# Patient Record
Sex: FEMALE | Race: WHITE | Employment: UNEMPLOYED | URBAN - METROPOLITAN AREA
[De-identification: names, ages, dates, MRNs, and addresses within clinical notes are randomized per-mention and may not be internally consistent; named-entity substitution may affect disease eponyms.]

---

## 2017-01-11 ENCOUNTER — OFFICE VISIT (OUTPATIENT)
Dept: ORTHOPEDIC SURGERY | Age: 52
End: 2017-01-11

## 2017-01-11 VITALS
HEART RATE: 104 BPM | BODY MASS INDEX: 18.48 KG/M2 | DIASTOLIC BLOOD PRESSURE: 82 MMHG | TEMPERATURE: 98 F | SYSTOLIC BLOOD PRESSURE: 115 MMHG | WEIGHT: 106 LBS

## 2017-01-11 DIAGNOSIS — M21.20 FLEXION DEFORMITY: Primary | ICD-10-CM

## 2017-01-11 DIAGNOSIS — Z98.890 POST-OPERATIVE STATE: ICD-10-CM

## 2017-01-11 DIAGNOSIS — M79.675 TOE PAIN, LEFT: ICD-10-CM

## 2017-01-11 NOTE — MR AVS SNAPSHOT
Visit Information Date & Time Provider Department Dept. Phone Encounter #  
 1/11/2017  2:00  Edil Beckman, [facility] [phone] Follow-up Instructions Return in about 3 weeks (around 2/1/2017) for follow up evaluation. Upcoming Health Maintenance Date Due Hepatitis C Screening 1965 DTaP/Tdap/Td series (1 - Tdap) 3/16/1985 PAP AKA CERVICAL CYTOLOGY 3/16/1985 BREAST CANCER SCRN MAMMOGRAM 3/16/2014 FOBT Q 1 YEAR AGE 50-75 3/16/2014 INFLUENZA AGE 9 TO ADULT 8/1/2016 Allergies as of 1/11/2017  Review Complete On: 1/11/2017 By: Juan Beckman PA-C Severity Noted Reaction Type Reactions Aspirin    Unknown (comments) Severe stomach pain and bleeding Lyrica [Pregabalin]  10/12/2015    Other (comments) Slurred speech Nsaids (Non-steroidal Anti-inflammatory Drug)  10/12/2015    Other (comments) Hx  Of bleeding ulcers Sulfa (Sulfonamide Antibiotics)    Rash Tetracycline    Hives Current Immunizations  Never Reviewed No immunizations on file. Not reviewed this visit You Were Diagnosed With   
  
 Codes Comments Flexion deformity    -  Primary ICD-10-CM: M21.20 ICD-9-CM: 736.9 Post-operative state     ICD-10-CM: O35.575 ICD-9-CM: V45.89 Toe pain, left     ICD-10-CM: F17.028 ICD-9-CM: 729.5 Vitals BP Pulse Temp Weight(growth percentile) BMI OB Status 115/82 (BP 1 Location: Left arm, BP Patient Position: Sitting) (!) 104 98 °F (36.7 °C) (Oral) 106 lb (48.1 kg) 18.48 kg/m2 Postmenopausal  
 Smoking Status Former Smoker BMI and BSA Data Body Mass Index Body Surface Area  
 18.48 kg/m 2 1.47 m 2 Preferred [phone] [address] Your Updated Medication List  
  
   
 This list is accurate as of: 1/11/17  2:33 PM.  Always use your most recent med list.  
  
  
  
  
 0.9% sodium chloride solution ADDERALL 10 mg tablet Generic drug:  dextroamphetamine-amphetamine Take 10 mg by mouth three (3) times daily. baclofen 10 mg tablet Commonly known as:  LIORESAL Take 20 mg by mouth four (4) times daily. biotin 10,000 mcg Cap Take  by mouth. CALCIUM+D PO Take  by mouth. COMPAZINE 10 mg tablet Generic drug:  prochlorperazine Take 5 mg by mouth every six (6) hours as needed. COREG 6.25 mg tablet Generic drug:  carvedilol Take 6.25 mg by mouth daily. EFFEXOR  mg capsule Generic drug:  venlafaxine-SR Take  by mouth daily. estradiol 0.5 mg tablet Commonly known as:  ESTRACE Take  by mouth. FOSAMAX 70 mg tablet Generic drug:  alendronate Take  by mouth every Sunday. * HYDROmorphone 2 mg tablet Commonly known as:  DILAUDID Take one tablet PO Q 6 HRS as needed for **POST OPERATIVE BREAKTHROUGH PAIN**  
  
 * HYDROmorphone 2 mg tablet Commonly known as:  DILAUDID  
TAKE ONE TABLET BY MOUTH Q 6 HRS PRN for POST OPERATIVE BREAKTHROUGH PAIN **DO NOT FILL BEFORE 1/10/17**  Indications: PAIN  
  
 imipramine 25 mg tablet Commonly known as:  TOFRANIL Take 100 mg by mouth nightly. LaMICtal 100 mg tablet Generic drug:  lamoTRIgine Take  by mouth two (2) times a day. MARINOL 5 mg capsule Generic drug:  dronabinol Take 5 mg by mouth two (2) times daily as needed. Indications: HEADACHE  
  
 medroxyPROGESTERone 5 mg tablet Commonly known as:  PROVERA Take 5 mg by mouth daily. multivitamin tablet Commonly known as:  ONE A DAY Take 1 Tab by mouth daily. * PERCOCET  mg per tablet Generic drug:  oxyCODONE-acetaminophen Take 1 Tab by mouth. Take 1/3 tab as needed * oxyCODONE-acetaminophen  mg per tablet Commonly known as:  PERCOCET  
 1 tab po q 4 hrs prn pain * oxyCODONE-acetaminophen 5-325 mg per tablet Commonly known as:  PERCOCET Take 1 Tab by mouth three (3) times daily. Max Daily Amount: 3 Tabs. * oxyCODONE-acetaminophen 5-325 mg per tablet Commonly known as:  PERCOCET Take 1 Tab by mouth two (2) times a day. Max Daily Amount: 2 Tabs. * polyethylene glycol 17 gram/dose powder Commonly known as:  Reginia Crazier Take 17 g by mouth daily. * polyethylene glycol 17 gram/dose powder Commonly known as:  Reginia Crazier Take 17 g by mouth daily. predniSONE 10 mg tablet Commonly known as:  Berle Pouch Take  by mouth daily (with breakfast). * promethazine 25 mg tablet Commonly known as:  PHENERGAN Take 1 Tab by mouth every six (6) hours as needed for Nausea. * promethazine 25 mg tablet Commonly known as:  PHENERGAN Take 1 Tab by mouth every six (6) hours as needed for Nausea. PROTONIX 40 mg tablet Generic drug:  pantoprazole Take 40 mg by mouth daily. Twice a day RITALIN 10 mg tablet Generic drug:  methylphenidate Take  by mouth three (3) times daily. VITAMIN B COMPLEX PO Take  by mouth. ZOFRAN (AS HYDROCHLORIDE) 8 mg tablet Generic drug:  ondansetron hcl Take 8 mg by mouth every eight (8) hours as needed for Nausea. * Notice: This list has 10 medication(s) that are the same as other medications prescribed for you. Read the directions carefully, and ask your doctor or other care provider to review them with you. Follow-up Instructions Return in about 3 weeks (around 2/1/2017) for follow up evaluation. Patient Instructions · Sutures removed in the office today. You may shower in 2 days, no submerging in any water · Continue Activity modification · Weight bearing status:  non weight bearing to the left lower extremity · No lifting, twisting, squatting, deep bending, driving or strenuous activity. PLEASE SEEK IMMEDIATE ASSESSMENT BY ER PHYSICIAN IF ANY OF THE FOLLOWING EXIST:  
 
Excessive pain, swelling, redness or odor of or around the surgical area Temperature over 100.5 Nausea and vomiting lasting longer than 4 hours or if unable to take medications Any signs of decreased circulation or nerve impairment to extremity: change in color, persistent numbness, tingling, coldness or increase pain If any calf pain, calf tightness, shortness of breath, chest pain Any difficulty breathing at rest or with ambulation, any chest tightness/soreness Severe intractable pain, persistent swelling or drainage, development of a wound, incisional redness, finger/toe swelling or color changes, or CALF PAIN 
 
· Perform ankle pumps with non-surgical/non-injured extremity to help reduce the risk of blood clots · Please follow up in 2-3 weeks Introducing [facility] SERVICES! Dear Ruel: Thank you for requesting a [facility] account. Our records indicate that you already have an active [facility] account. You can access your account anytime at [email or web address] Did you know that you can access your hospital and ER discharge instructions at any time in [facility]? You can also review all of your test results from your hospital stay or ER visit. Additional Information If you have questions, please visit the Frequently Asked Questions section of the [facility] website at [email or web address] Remember, [facility] is NOT to be used for urgent needs. For medical emergencies, dial 911. Now available from your iPhone and Android! Please provide this summary of care documentation to your next provider. Your primary care clinician is listed as Reyna Owens. If you have any questions after today's visit, please call [phone].

## 2017-01-11 NOTE — PATIENT INSTRUCTIONS
· Sutures removed in the office today. You may shower in 2 days, no submerging in any water    · Continue Activity modification    · Weight bearing status:  non weight bearing to the left lower extremity    · No lifting, twisting, squatting, deep bending, driving or strenuous activity.     PLEASE SEEK IMMEDIATE ASSESSMENT BY ER PHYSICIAN IF ANY OF THE FOLLOWING EXIST:     Excessive pain, swelling, redness or odor of or around the surgical area   Temperature over 100.5   Nausea and vomiting lasting longer than 4 hours or if unable to take medications   Any signs of decreased circulation or nerve impairment to extremity: change in color, persistent numbness, tingling, coldness or increase pain   If any calf pain, calf tightness, shortness of breath, chest pain   Any difficulty breathing at rest or with ambulation, any chest tightness/soreness  Severe intractable pain, persistent swelling or drainage, development of a wound, incisional redness, finger/toe swelling or color changes, or CALF PAIN    · Perform ankle pumps with non-surgical/non-injured extremity to help reduce the risk of blood clots    · Please follow up in 2-3 weeks

## 2017-01-11 NOTE — PROGRESS NOTES
Patient: Ana Cristina Keller                MRN: [id number]       SSN: [id number]  YOB: 1965            AGE: 45 y.o. SEX: female    Tsering Joseph MD    POST OP OFFICE NOTE  DOS: 12/22/16    HPI:     The patient is a 45 y.o. female who presents today for follow up 20 days s/p revision left number four toe PIP resection arthroplasty. Patient has been NWB to the left lower extremity. Patient reports doing well other than post op pain which is controlled. Patient denies any fever, chills, chest pain, shortness of breath or calf pain. There are no new illness or injuries to report since last seen in the office. PHYSICAL EXAM:     Visit Vitals    /82 (BP 1 Location: Left arm, BP Patient Position: Sitting)    Pulse (!) 104    Temp 98 °F (36.7 °C) (Oral)    Wt 106 lb (48.1 kg)    BMI 18.48 kg/m2       GEN:  Alert, well developed, well nourished, well appearing 45 y.o. female in no acute distress. PSYCH:  Normal affect, mood, and conduct. alert, oriented x 3 alert, oriented x 3, no dementia  EXAMINATION OF: left foot  DRESSINGS: CDI  DRAINAGE: none  INCISION: Incision looks good, skin well approximated, no dehiscence  SKIN: mild edema , no erythema, no  ecchymosis, no warmth    TENDERNESS:  mild tenderness to palpation (as expected after surgery)  NEUROVASCULAR:  grossly intact. Positive distal pulses and capillary refill. DVT ASSESSMENT:  The calf is not tender to palpation. No evidence of DVT seen on physical exam.  ROM: not tested    IMPRESSION:     Encounter Diagnoses     ICD-10-CM ICD-9-CM   1. Flexion deformity M21.20 736.9   2. Post-operative state Z98.890 V45.89   3. Toe pain, left M79.675 729.5       PLAN:     · Dressing: sutures removed in the office today. Patient placed in post op shoe    · Keep the current dressings on and in place. You need to keep this incision clean and dry. If you have a splint or cast on please keep this clean and dry as well.     · You should expect swelling and bruising in the area over the next several days. You may elevate the left extremity on 1 pillow. Place the pillow horizontal so that no pressure is on the back of your heel. You may apply an icepack on top of the dressing to help minimize the swelling. · Keep all pets away from  any wound present in order to prevent infection. · Continue Activity modification    · Weight bearing status:  non weight bearing to the left lower extremity    · No lifting, twisting, squatting, deep bending, driving or strenuous activity. PLEASE SEEK IMMEDIATE ASSESSMENT BY ER PHYSICIAN IF ANY OF THE FOLLOWING EXIST:     Excessive pain, swelling, redness or odor of or around the surgical area   Temperature over 100.5   Nausea and vomiting lasting longer than 4 hours or if unable to take medications   Any signs of decreased circulation or nerve impairment to extremity: change in color, persistent numbness, tingling, coldness or increase pain   If any calf pain, calf tightness, shortness of breath, chest pain   Any difficulty breathing at rest or with ambulation, any chest tightness/soreness  Severe intractable pain, persistent swelling or drainage, development of a wound, incisional redness, finger/toe swelling or color changes, or CALF PAIN    · Perform ankle pumps with non-surgical/non-injured extremity to help reduce the risk of blood clots    · Please follow up in 2-3 weeks     · DO NOT DRIVE WHILE TAKING NARCOTIC PAIN MEDICINE      Patient has been discussed with Dr. Jn Mayen during this visit and he agrees with the assessment and plan    Patient expresses understanding of the plan. Patient education provided on post surgical care. REVIEW OF SYSTEMS:     Otherwise as noted in HPI     RADIOGRAPHS & DIAGNOSTIC STUDIES     No results found for any visits on 01/11/17.       Sheridan Ford PA-C  1/11/2017

## 2017-02-02 ENCOUNTER — OFFICE VISIT (OUTPATIENT)
Dept: ORTHOPEDIC SURGERY | Age: 52
End: 2017-02-02

## 2017-02-02 VITALS — DIASTOLIC BLOOD PRESSURE: 85 MMHG | SYSTOLIC BLOOD PRESSURE: 122 MMHG | HEART RATE: 85 BPM

## 2017-02-02 DIAGNOSIS — M79.675 TOE PAIN, LEFT: ICD-10-CM

## 2017-02-02 DIAGNOSIS — Z98.890 POST-OPERATIVE STATE: ICD-10-CM

## 2017-02-02 DIAGNOSIS — M21.20 FLEXION DEFORMITY: Primary | ICD-10-CM

## 2017-02-02 NOTE — PROGRESS NOTES
Patient: Gretchen Cedeño                MRN: [id number]       SSN: [id number]  YOB: 1965            AGE: 45 y.o. SEX: female    Charlie Ochoa MD    POST OP OFFICE NOTE  DOS: 12/22/16    HPI:     The patient is a 45 y.o. female who presents today for follow up 42 days s/p revision left number four toe PIP resection arthroplasty. Patient has been WBAT to the left lower extremity in post op shoe using cane. Patient reports doing well with no post op pain. She states that she is very pleased with her surgery and she is doing much better with much less pain. She has some residual swelling that comes and goes. Patient denies any fever, chills, chest pain, shortness of breath or calf pain. There are no new illness or injuries to report since last seen in the office. PHYSICAL EXAM:     Visit Vitals    /85    Pulse 85       GEN:  Alert, well developed, well nourished, well appearing 45 y.o. female in no acute distress. PSYCH:  Normal affect, mood, and conduct. alert, oriented x 3 alert, oriented x 3, no dementia  EXAMINATION OF: left foot  DRESSINGS: CDI  DRAINAGE: none  INCISION: Incision looks good, skin well approximated, no dehiscence  SKIN: mild edema , no erythema, no  ecchymosis, no warmth    TENDERNESS:  No tenderness to palpation   NEUROVASCULAR:  grossly intact. Positive distal pulses and capillary refill. DVT ASSESSMENT:  The calf is not tender to palpation. No evidence of DVT seen on physical exam.  ROM: WNL    IMPRESSION:     Encounter Diagnoses     ICD-10-CM ICD-9-CM   1. Flexion deformity M21.20 736.9   2. Toe pain, left M79.675 729.5   3. Post-operative state Z98.890 V45.89       PLAN:       · Keep all pets away from any wound present in order to prevent infection.     · Continue Activity modification - continue to wear post op shoe and wean to supportive shoe/sneaker with wide toe box    · Weight bearing status:  weight bearing to the left lower extremity as tolerated    · No lifting, twisting, squatting, deep bending, driving or strenuous activity. · Please follow up in 6 weeks     · DO NOT [address]      Patient has been discussed with Dr. Gilford Jesus during this visit and he agrees with the assessment and plan    Patient expresses understanding of the plan. Patient education provided on post surgical care. REVIEW OF SYSTEMS:     Otherwise as noted in HPI     RADIOGRAPHS & DIAGNOSTIC STUDIES     No results found for any visits on 02/02/17.       Kimmy An PA-C  2/2/2017

## 2017-03-03 DIAGNOSIS — M79.672 LEFT FOOT PAIN: Primary | ICD-10-CM

## 2017-04-19 ENCOUNTER — OFFICE VISIT (OUTPATIENT)
Dept: ORTHOPEDIC SURGERY | Age: 52
End: 2017-04-19

## 2017-04-19 VITALS
SYSTOLIC BLOOD PRESSURE: 116 MMHG | DIASTOLIC BLOOD PRESSURE: 85 MMHG | WEIGHT: 107 LBS | BODY MASS INDEX: 18.27 KG/M2 | HEART RATE: 88 BPM | HEIGHT: 64 IN

## 2017-04-19 DIAGNOSIS — M20.42 HAMMER TOE OF LEFT FOOT: Primary | ICD-10-CM

## 2017-04-19 NOTE — MR AVS SNAPSHOT
Visit Information Date & Time Provider Department Dept. Phone Encounter #  
 4/19/2017  3:30 PM Jessee Coreas, [address] [facility] [phone] Upcoming Health Maintenance Date Due Hepatitis C Screening 1965 DTaP/Tdap/Td series (1 - Tdap) 3/16/1985 PAP AKA CERVICAL CYTOLOGY 3/16/1985 BREAST CANCER SCRN MAMMOGRAM 3/16/2014 FOBT Q 1 YEAR AGE 50-75 3/16/2014 INFLUENZA AGE 9 TO ADULT 8/1/2016 Allergies as of 4/19/2017  Review Complete On: 4/19/2017 By: Allen Piña LPN Severity Noted Reaction Type Reactions Aspirin    Unknown (comments) Severe stomach pain and bleeding Lyrica [Pregabalin]  10/12/2015    Other (comments) Slurred speech Nsaids (Non-steroidal Anti-inflammatory Drug)  10/12/2015    Other (comments) Hx  Of bleeding ulcers Sulfa (Sulfonamide Antibiotics)    Rash Tetracycline    Hives Current Immunizations  Never Reviewed No immunizations on file. Not reviewed this visit Vitals BP Pulse Height(growth percentile) Weight(growth percentile) BMI OB Status 116/85 88 5' 3.5\" (1.613 m) 107 lb (48.5 kg) 18.66 kg/m2 Postmenopausal  
 Smoking Status Former Smoker Vitals History BMI and BSA Data Body Mass Index Body Surface Area  
 18.66 kg/m 2 1.47 m 2 Preferred Pharmacy Pharmacy Name Phone [phone], [address] Your Updated Medication List  
  
   
This list is accurate as of: 4/19/17  3:47 PM.  Always use your most recent med list.  
  
  
  
  
 0.9% sodium chloride solution ADDERALL 10 mg tablet Generic drug:  dextroamphetamine-amphetamine Take 10 mg by mouth three (3) times daily. baclofen 10 mg tablet Commonly known as:  LIORESAL Take 20 mg by mouth four (4) times daily. biotin 10,000 mcg Cap Take  by mouth. CALCIUM+D PO Take  by mouth. COMPAZINE 10 mg tablet Generic drug:  prochlorperazine Take 5 mg by mouth every six (6) hours as needed. COREG 6.25 mg tablet Generic drug:  carvedilol Take 6.25 mg by mouth daily. EFFEXOR  mg capsule Generic drug:  venlafaxine-SR Take  by mouth daily. estradiol 0.5 mg tablet Commonly known as:  ESTRACE Take  by mouth. FOSAMAX 70 mg tablet Generic drug:  alendronate Take  by mouth every Sunday. * HYDROmorphone 2 mg tablet Commonly known as:  DILAUDID Take one tablet PO Q 6 HRS as needed for **POST OPERATIVE BREAKTHROUGH PAIN**  
  
 * HYDROmorphone 2 mg tablet Commonly known as:  DILAUDID  
TAKE ONE TABLET BY MOUTH Q 6 HRS PRN for POST OPERATIVE BREAKTHROUGH PAIN **DO NOT FILL BEFORE 1/10/17**  Indications: PAIN  
  
 imipramine 25 mg tablet Commonly known as:  TOFRANIL Take 100 mg by mouth nightly. LaMICtal 100 mg tablet Generic drug:  lamoTRIgine Take  by mouth two (2) times a day. MARINOL 5 mg capsule Generic drug:  dronabinol Take 5 mg by mouth two (2) times daily as needed. Indications: HEADACHE  
  
 medroxyPROGESTERone 5 mg tablet Commonly known as:  PROVERA Take 5 mg by mouth daily. multivitamin tablet Commonly known as:  ONE A DAY Take 1 Tab by mouth daily. * PERCOCET  mg per tablet Generic drug:  oxyCODONE-acetaminophen Take 1 Tab by mouth. Take 1/3 tab as needed * oxyCODONE-acetaminophen  mg per tablet Commonly known as:  PERCOCET  
1 tab po q 4 hrs prn pain * oxyCODONE-acetaminophen 5-325 mg per tablet Commonly known as:  PERCOCET Take 1 Tab by mouth three (3) times daily. Max Daily Amount: 3 Tabs. * oxyCODONE-acetaminophen 5-325 mg per tablet Commonly known as:  PERCOCET Take 1 Tab by mouth two (2) times a day. Max Daily Amount: 2 Tabs. * polyethylene glycol 17 gram/dose powder Commonly known as:  Sanam Duarte Take 17 g by mouth daily. * polyethylene glycol 17 gram/dose powder Commonly known as:  Sanam Duarte Take 17 g by mouth daily. predniSONE 10 mg tablet Commonly known as:  Yesica Clarisa Take  by mouth daily (with breakfast). * promethazine 25 mg tablet Commonly known as:  PHENERGAN Take 1 Tab by mouth every six (6) hours as needed for Nausea. * promethazine 25 mg tablet Commonly known as:  PHENERGAN Take 1 Tab by mouth every six (6) hours as needed for Nausea. PROTONIX 40 mg tablet Generic drug:  pantoprazole Take 40 mg by mouth daily. Twice a day RITALIN 10 mg tablet Generic drug:  methylphenidate Take  by mouth three (3) times daily. VITAMIN B COMPLEX PO Take  by mouth. ZOFRAN (AS HYDROCHLORIDE) 8 mg tablet Generic drug:  ondansetron hcl Take 8 mg by mouth every eight (8) hours as needed for Nausea. * Notice: This list has 10 medication(s) that are the same as other medications prescribed for you. Read the directions carefully, and ask your doctor or other care provider to review them with you. Patient Instructions Please follow up as needed. You are advised to contact us if your condition worsens. Neuropathic Pain: Care Instructions Your Care Instructions Neuropathic pain is caused by pressure on or damage to your nerves. It's often simply called nerve pain. Some people feel this type of pain all the time. For others, it comes and goes. Diabetes, shingles, or an injury can cause nerve pain. Many people say the pain feels sharp, burning, or stabbing. But some people feel it as a dull ache. In some cases, it makes your skin very sensitive. So touch, pressure, and other sensations that did not hurt before may now cause pain. It's important to know that this kind of pain is real and can affect your quality of life. It's also important to know that treatment can help. Treatment includes pain medicines, exercise, and physical therapy. Medicines can help reduce the number of pain signals that travel over the nerves. This can make the painful areas less sensitive. It can also help you sleep better and improve your mood. But medicines are only one part of successful treatment. Most people do best with more than one kind of treatment. Your doctor may recommend that you try cognitive-behavioral therapy and stress management. Or, if needed, you may decide to try to quit smoking, lower your blood pressure, or better control blood sugar. These kinds of healthy changes can also make a difference. If you feel that your treatment is not working, talk to your doctor. And be sure to tell your doctor if you think you might be depressed or anxious. These are common problems that can also be treated. Follow-up care is a key part of your treatment and safety. Be sure to make and go to all appointments, and call your doctor if you are having problems. It's also a good idea to know your test results and keep a list of the medicines you take. How can you care for yourself at home? · Be safe with medicines. Read and follow all instructions on the label. ¨ If the doctor gave you a prescription medicine for pain, take it as prescribed. ¨ If you are not taking a prescription pain medicine, ask your doctor if you can take an over-the-counter medicine. · Save hard tasks for days when you have less pain. Follow a hard task with an easy task. And remember to take breaks. · Relax, and reduce stress. You may want to try deep breathing or meditation. These can help. · Keep moving. Gentle, daily exercise can help reduce pain. Your doctor or physical therapist can tell you what type of exercise is best for you. This may include walking, swimming, and stationary biking. It may also include stretches and range-of-motion exercises. · Try heat, cold packs, and massage. · Get enough sleep. Constant pain can make you more tired. If the pain makes it hard to sleep, talk with your doctor. · Think positively. Your thoughts can affect your pain. Do fun things to distract yourself from the pain. See a movie, read a book, listen to music, or spend time with a friend. · Keep a pain diary. Try to write down how strong your pain is and what it feels like. Also try to notice and write down how your moods, thoughts, sleep, activities, and medicine affect your pain. These notes can help you and your doctor find the best ways to treat your pain. Reducing constipation caused by pain medicine Pain medicines often cause constipation. To reduce constipation: 
· Include fruits, vegetables, beans, and whole grains in your diet each day. These foods are high in fiber. · Drink plenty of fluids, enough so that your urine is light yellow or clear like water. If you have kidney, heart, or liver disease and have to limit fluids, talk with your doctor before you increase the amount of fluids you drink. · Get some exercise every day. Build up slowly to 30 to 60 minutes a day on 5 or more days of the week. · Take a fiber supplement, such as Citrucel or Metamucil, every day if needed. Read and follow all instructions on the label. · Schedule time each day for a bowel movement. Having a daily routine may help. Take your time and do not strain when having a bowel movement. · Ask your doctor about a laxative. The goal is to have one easy bowel movement every 1 to 2 days. Do not let constipation go untreated for more than 3 days. When should you call for help? Call your doctor now or seek immediate medical care if: 
· You feel sad, anxious, or hopeless for more than a few days. This could mean you are depressed. Depression is common in people who have a lot of pain. But it can be treated. · You have trouble with bowel movements, such as: 
¨ No bowel movement in 3 days. ¨ Blood in the anal area, in your stool, or on the toilet paper. ¨ Diarrhea for more than 24 hours. Watch closely for changes in your health, and be sure to contact your doctor if: 
· Your pain is getting worse. · You can't sleep because of pain. · You are very worried or anxious about your pain. · You have trouble taking your pain medicine. · You have any concerns about your pain medicine or its side effects. · You have vomiting or cramps for more than 2 hours. Where can you learn more? Go to http://roldan-yudith.info/. Enter N144 in the search box to learn more about \"Neuropathic Pain: Care Instructions. \" Current as of: October 14, 2016 Content Version: 11.2 © 5082-3259 [employer]. Care instructions adapted under license by [employer] (which disclaims liability or warranty for this information). If you have questions about a medical condition or this instruction, always ask your healthcare professional. [place] [address] any warranty or liability for your use of this information. Introducing [facility]! Dear Judith Kaminski: Thank you for requesting a Glof[employer] account. Our records indicate that you already have an active Glofox account. You can access your account anytime at [email or web address] Did you know that you can access your hospital and ER discharge instructions at any time in Glofox? You can also review all of your test results from your hospital stay or ER visit. Additional Information If you have questions, please visit the Frequently Asked Questions section of the Glofox website at [email or web address] Remember, Glofox is NOT to be used for urgent needs. For medical emergencies, dial 911. Now available from your iPhone and Android! Please provide this summary of care documentation to your next provider. Your primary care clinician is listed as Rosalia EduardoBaldwin. If you have any questions after today's visit, please call [phone].

## 2017-04-19 NOTE — PATIENT INSTRUCTIONS
Please follow up as needed. You are advised to contact us if your condition worsens. Neuropathic Pain: Care Instructions  Your Care Instructions  Neuropathic pain is caused by pressure on or damage to your nerves. It's often simply called nerve pain. Some people feel this type of pain all the time. For others, it comes and goes. Diabetes, shingles, or an injury can cause nerve pain. Many people say the pain feels sharp, burning, or stabbing. But some people feel it as a dull ache. In some cases, it makes your skin very sensitive. So touch, pressure, and other sensations that did not hurt before may now cause pain. It's important to know that this kind of pain is real and can affect your quality of life. It's also important to know that treatment can help. Treatment includes pain medicines, exercise, and physical therapy. Medicines can help reduce the number of pain signals that travel over the nerves. This can make the painful areas less sensitive. It can also help you sleep better and improve your mood. But medicines are only one part of successful treatment. Most people do best with more than one kind of treatment. Your doctor may recommend that you try cognitive-behavioral therapy and stress management. Or, if needed, you may decide to try to quit smoking, lower your blood pressure, or better control blood sugar. These kinds of healthy changes can also make a difference. If you feel that your treatment is not working, talk to your doctor. And be sure to tell your doctor if you think you might be depressed or anxious. These are common problems that can also be treated. Follow-up care is a key part of your treatment and safety. Be sure to make and go to all appointments, and call your doctor if you are having problems. It's also a good idea to know your test results and keep a list of the medicines you take. How can you care for yourself at home? · Be safe with medicines.  Read and follow all instructions on the label. ¨ If the doctor gave you a prescription medicine for pain, take it as prescribed. ¨ If you are not taking a prescription pain medicine, ask your doctor if you can take an over-the-counter medicine. · Save hard tasks for days when you have less pain. Follow a hard task with an easy task. And remember to take breaks. · Relax, and reduce stress. You may want to try deep breathing or meditation. These can help. · Keep moving. Gentle, daily exercise can help reduce pain. Your doctor or physical therapist can tell you what type of exercise is best for you. This may include walking, swimming, and stationary biking. It may also include stretches and range-of-motion exercises. · Try heat, cold packs, and massage. · Get enough sleep. Constant pain can make you more tired. If the pain makes it hard to sleep, talk with your doctor. · Think positively. Your thoughts can affect your pain. Do fun things to distract yourself from the pain. See a movie, read a book, listen to music, or spend time with a friend. · Keep a pain diary. Try to write down how strong your pain is and what it feels like. Also try to notice and write down how your moods, thoughts, sleep, activities, and medicine affect your pain. These notes can help you and your doctor find the best ways to treat your pain. Reducing constipation caused by pain medicine  Pain medicines often cause constipation. To reduce constipation:  · Include fruits, vegetables, beans, and whole grains in your diet each day. These foods are high in fiber. · Drink plenty of fluids, enough so that your urine is light yellow or clear like water. If you have kidney, heart, or liver disease and have to limit fluids, talk with your doctor before you increase the amount of fluids you drink. · Get some exercise every day. Build up slowly to 30 to 60 minutes a day on 5 or more days of the week.   · Take a fiber supplement, such as Citrucel or Metamucil, every day if needed. Read and follow all instructions on the label. · Schedule time each day for a bowel movement. Having a daily routine may help. Take your time and do not strain when having a bowel movement. · Ask your doctor about a laxative. The goal is to have one easy bowel movement every 1 to 2 days. Do not let constipation go untreated for more than 3 days. When should you call for help? Call your doctor now or seek immediate medical care if:  · You feel sad, anxious, or hopeless for more than a few days. This could mean you are depressed. Depression is common in people who have a lot of pain. But it can be treated. · You have trouble with bowel movements, such as:  ¨ No bowel movement in 3 days. ¨ Blood in the anal area, in your stool, or on the toilet paper. ¨ Diarrhea for more than 24 hours. Watch closely for changes in your health, and be sure to contact your doctor if:  · Your pain is getting worse. · You can't sleep because of pain. · You are very worried or anxious about your pain. · You have trouble taking your pain medicine. · You have any concerns about your pain medicine or its side effects. · You have vomiting or cramps for more than 2 hours. Where can you learn more? Go to http://roldan-yudith.info/. Enter P510 in the search box to learn more about \"Neuropathic Pain: Care Instructions. \"  Current as of: October 14, 2016  Content Version: 11.2  © 1283-1605 [employer]. Care instructions adapted under license by [employer] (which disclaims liability or warranty for this information). If you have questions about a medical condition or this instruction, always ask your healthcare professional. [place] [address] any warranty or liability for your use of this information.

## 2017-04-19 NOTE — PROGRESS NOTES
AMBULATORY PROGRESS NOTE      Patient: Jayleen Worrell             MRN: [id number]     SSN: [id number] Body mass index is 18.66 kg/(m^2). YOB: 1965     AGE: 47 y.o. EX: female    PCP: Georgina Dang MD    IMPRESSION/DIAGNOSIS AND TREATMENT PLAN     DIAGNOSES  1. Hammer toe of left foot         Jayleen Worrell understands her diagnoses and the proposed plan. Plan:    1) Continue activity as tolerated    RTO - PRN, as she is doing well from the 4th left toe surgery. HPI AND EXAMINATION     Jayleen Worrell IS A 47 y.o. female who presents to my outpatient office today for follow up 42 days s/p revision left number four toe PIP resection arthroplasty. The patient presents to the office today stating that she has been WBAT to the LLE. Patient reports doing well with no post op pain. She states that she is very pleased with her surgery and she is doing much better with much less pain. She does not numbness over the fourth left toe. Patient denies any fever, chills, chest pain, shortness of breath or calf pain. There are no new illness or injuries to report since last seen in the office. Patient is getting a trigeminal neuralgia implant in 5/2017. PHYSICAL EXAM: 4/19/2017      Patient is a well developed, well nourished 47 y.o. female alert and oriented x 3 in no acute distress. Visit Vitals    /85    Pulse 88    Ht 5' 3.5\" (1.613 m)    Wt 107 lb (48.5 kg)    BMI 18.66 kg/m2        Jayleen Worrell arrives to office via: with and without assistive device:   she is accompanied in the examination room by: Mother  APPEARANCE: alert, well appearing, and in no distress. PSYCH:  Normal affect, mood, and conduct. alert, oriented x 3  no dementia  GEN: Alert, well developed, well nourished, well appearing 45 y.o. female in no acute distress. PSYCH: Normal affect, mood, and conduct.  alert, oriented x 3 alert, oriented x 3, no dementia  EXAMINATION OF: left foot  DRESSINGS: CDI  DRAINAGE: none  INCISION: Incision looks good, skin well approximated, no dehiscence  SKIN: mild edema , no erythema, no ecchymosis, no warmth   TENDERNESS: No tenderness to palpation   NEUROVASCULAR: grossly intact. Positive distal pulses and capillary refill. DVT ASSESSMENT: The calf is not tender to palpation. No evidence of DVT seen on physical exam.  ROM: WNL    ASSESSMENT/PLAN     1. INSTRUCTIONS: WBAT    2. RESTRICTIONS: Work/Activity/School restrictions: Activity as tolerated    3. MEDICATIONS: No orders of the defined types were placed in this encounter. 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 4. FOLLOW UP TO SEE [facility]: if any calf pain, shortness of breath, any calf   Swelling    PLEASE SEEK IMMEDIATE ASSESSMENT BY ER PHYSICIAN IF ANY OF THE FOLLOWING EXIST:   Excessive pain, swelling, redness or odor of or around the surgical area   Temperature over 100.5   Nausea and vomiting lasting longer than 4 hours or if unable to take medications   Any signs of decreased circulation or nerve impairment to extremity: change in color, persistent numbness, tingling, coldness or increase pain   If Any calf pain, calf tightness, shortness of breath, chest pain  Any difficulty breathing at rest or with ambulation, any chest tightness/soreness    Severe intractable pain, Persistent swelling or drainage, development of a   wound, incisional redness, finger/toe swelling or color changes, or CALF PAIN.     CHART REVIEW     Past Medical History:   Diagnosis Date    Anemia     Chronic pain     Contact lens/glasses fitting     Frequent UTI 2006    GERD (gastroesophageal reflux disease)     H/O eating disorder     remote    Hip fracture, left ([facility] [address] 2000    Hypertension     Leg length discrepancy     Needs pre-anesthesia assessment Patient reports a history of high tolerance to pain medicine and prior hisoty of drug use    Post herpetic neuralgia     PUD (peptic ulcer disease)     Rheumatoid arthritis ([address]     Shingles April 2014    Tooth abscess 10/6/15    Completed Amoxicillin      Current Outpatient Prescriptions   Medication Sig    prochlorperazine (COMPAZINE) 10 mg tablet Take 5 mg by mouth every six (6) hours as needed.  polyethylene glycol (MIRALAX) 17 gram/dose powder Take 17 g by mouth daily.  dextroamphetamine-amphetamine (ADDERALL) 10 mg tablet Take 10 mg by mouth three (3) times daily.  oxyCODONE-acetaminophen (PERCOCET)  mg per tablet 1 tab po q 4 hrs prn pain    imipramine (TOFRANIL) 25 mg tablet Take 100 mg by mouth nightly.  polyethylene glycol (MIRALAX) 17 gram/dose powder Take 17 g by mouth daily.  CALCIUM CARBONATE/VITAMIN D3 (CALCIUM+D PO) Take  by mouth.  biotin 10,000 mcg cap Take  by mouth.  VITAMIN B COMPLEX PO Take  by mouth.  multivitamin (ONE A DAY) tablet Take 1 Tab by mouth daily.  lamoTRIgine (LAMICTAL) 100 mg tablet Take  by mouth two (2) times a day.  dronabinol (MARINOL) 5 mg capsule Take 5 mg by mouth two (2) times daily as needed. Indications: HEADACHE    carvedilol (COREG) 6.25 mg tablet Take 6.25 mg by mouth daily.  ondansetron hcl (ZOFRAN, AS HYDROCHLORIDE,) 8 mg tablet Take 8 mg by mouth every eight (8) hours as needed for Nausea.  alendronate (FOSAMAX) 70 mg tablet Take  by mouth every Sunday.  venlafaxine-SR (EFFEXOR XR) 150 mg capsule Take  by mouth daily.  baclofen (LIORESAL) 10 mg tablet Take 20 mg by mouth four (4) times daily.  pantoprazole (PROTONIX) 40 mg tablet Take 40 mg by mouth daily. Twice a day    predniSONE (DELTASONE) 10 mg tablet Take  by mouth daily (with breakfast).  oxyCODONE-acetaminophen (PERCOCET)  mg per tablet Take 1 Tab by mouth.  Take 1/3 tab as needed    HYDROmorphone (DILAUDID) 2 mg tablet Take one tablet PO Q 6 HRS as needed for **POST OPERATIVE BREAKTHROUGH PAIN**    HYDROmorphone (DILAUDID) 2 mg tablet TAKE ONE TABLET BY MOUTH Q 6 HRS PRN for POST OPERATIVE BREAKTHROUGH PAIN  **DO NOT FILL BEFORE 1/10/17**  Indications: PAIN    promethazine (PHENERGAN) 25 mg tablet Take 1 Tab by mouth every six (6) hours as needed for Nausea.  oxyCODONE-acetaminophen (PERCOCET) 5-325 mg per tablet Take 1 Tab by mouth two (2) times a day. Max Daily Amount: 2 Tabs.  oxyCODONE-acetaminophen (PERCOCET) 5-325 mg per tablet Take 1 Tab by mouth three (3) times daily. Max Daily Amount: 3 Tabs.  0.9% sodium chloride solution     medroxyPROGESTERone (PROVERA) 5 mg tablet Take 5 mg by mouth daily.  promethazine (PHENERGAN) 25 mg tablet Take 1 Tab by mouth every six (6) hours as needed for Nausea.  estradiol (ESTRACE) 0.5 mg tablet Take  by mouth.  methylphenidate (RITALIN) 10 mg tablet Take  by mouth three (3) times daily. No current facility-administered medications for this visit. Allergies   Allergen Reactions    Aspirin Unknown (comments)     Severe stomach pain and bleeding    Lyrica [Pregabalin] Other (comments)     Slurred speech    Nsaids (Non-Steroidal Anti-Inflammatory Drug) Other (comments)     Hx  Of bleeding ulcers    Sulfa (Sulfonamide Antibiotics) Rash    Tetracycline Hives     Past Surgical History:   Procedure Laterality Date    HX GYN  2012    fibroids    HX ORTHOPAEDIC Left 2012    partial hip replacement    HX ORTHOPAEDIC  2009    attempt bunionectomy    HX ORTHOPAEDIC  10/2015    Dr. Sandra Toussaint: Mauricio Mejia History     Occupational History    Not on file.      Social History Main Topics    Smoking status: Former Smoker     Quit date: 7/20/2015    Smokeless tobacco: Never Used    Alcohol use No    Drug use: No      Comment: Prior history of drug use    Sexual activity: Not on file     Family History   Problem Relation Age of Onset    Arthritis-osteo Other     Stroke Mother     Hypertension Mother        REVIEW OF SYSTEMS : 4/19/2017  ALL BELOW ARE Negative except : SEE HPI       Constitutional: Negative for fever, chills and weight loss. Neg Weigh Loss  Cardiovascular: Negative for chest pain, claudication and leg swelling. SOB, HOFFMANN   Gastrointestinal: Negative for  pain, N/V/D/C, Blood in stool or urine,dysuria, hematuria,        Incontinence, pelvic pain  Musculoskeletal: see HPI. Neurological: Negative for dizziness and weakness. Negative for headaches,Visual Changes, Confusion, Seizures,   Psychiatric/Behavioral: Negative for depression, memory loss and substance abuse. Extremities:  Negative for  hair changes, rash or skin lesion changes. Hematologic: Negative for Bleeding problems, bruising, pallor or swollen lymph nodes. Peripheral Vascular: No calf pain, vascular vein tenderness to calf pain              No calf throbbing, posterior knee throbbing pain    DIAGNOSTIC IMAGING     No notes on file    Written by Paige Ramirez, as dictated by Roberto Garrison MD. IDr., Roberto Garrison MD, confirm that all documentation is accurate.

## 2018-04-10 ENCOUNTER — OFFICE VISIT (OUTPATIENT)
Dept: ORTHOPEDIC SURGERY | Age: 53
End: 2018-04-10

## 2018-04-10 VITALS
OXYGEN SATURATION: 93 % | BODY MASS INDEX: 18.27 KG/M2 | TEMPERATURE: 96.4 F | WEIGHT: 107 LBS | HEART RATE: 73 BPM | SYSTOLIC BLOOD PRESSURE: 116 MMHG | DIASTOLIC BLOOD PRESSURE: 86 MMHG | HEIGHT: 64 IN

## 2018-04-10 DIAGNOSIS — Z01.818 PREOP EXAMINATION: ICD-10-CM

## 2018-04-10 DIAGNOSIS — Z01.811 PRE-OPERATIVE RESPIRATORY EXAMINATION: ICD-10-CM

## 2018-04-10 DIAGNOSIS — M21.611 BUNION, RIGHT FOOT: Primary | ICD-10-CM

## 2018-04-10 DIAGNOSIS — Z01.810 PRE-OPERATIVE CARDIOVASCULAR EXAMINATION: ICD-10-CM

## 2018-04-10 DIAGNOSIS — Z01.812 BLOOD TESTS PRIOR TO TREATMENT OR PROCEDURE: ICD-10-CM

## 2018-04-10 NOTE — PROCEDURES
FOOT X RAYS 3 VIEWS Right   4/10/2018    WEIGHT BEARING    X RAYS AT [facility] [address] [place] [address]  4/10/2018      Bones: No fractures or dislocations. No focal osteolytic or osteoblastic process    Bone Spurs: No significant bone spurs   Alignment: Bunion Alignment: WNL    severe Hallux Valgus Alignment, moderate increased IM ANGLE (1st/2nd)   Metatarsus Adductus is mild   Midfoot Alignment is WNL. Joint: Slightly Incongruent  Soft Tissues:  , Soft Tissues Mild swelling dorsal midfoot     No ankle joint effusion in lateral projection. Mineralization: Suggests Osteopenia    I have personally reviewed the results of the above study.  The interpretation of this study is my professional opinion    Debra Pineda MD  4/10/2018  11:52 AM           .

## 2018-04-10 NOTE — PROGRESS NOTES
AMBULATORY PROGRESS NOTE      Patient: Yazmin Shepherd             MRN: [id number]     SSN: [id number] Body mass index is 18.66 kg/(m^2). YOB: 1965     AGE: 46 y.o. EX: female    PCP: Ayden Capone MD    IMPRESSION/DIAGNOSIS AND TREATMENT PLAN     DIAGNOSES  1. Bunion, right foot    2. Pre-operative cardiovascular examination    3. Pre-operative respiratory examination    4. Blood tests prior to treatment or procedure    5. Preop examination        Orders Placed This Encounter    CULTURE, URINE    [67406] Foot Min 3V    XR CHEST PA LAT    CBC WITH AUTOMATED DIFF    METABOLIC PANEL, COMPREHENSIVE    URINALYSIS W/ RFLX MICROSCOPIC    PROTHROMBIN TIME + INR    EKG, 12 LEAD, INITIAL      Yazmin Shepherd understands her diagnoses and the proposed plan. I had a lengthy discussion with her in the presence of her mom. The patient has a bunion deformity and has tenderness to the metatarsal heads. My overall impression is: Moderate to severe bunion deformity to the right forefoot with pronation of the right great toe and with focal tenderness to the metatarsal heads, two, three, four, and five. So, she has metatarsalgia. She has a semi-rigid hammertoe to the right number two and three toes, flexion overpull to the number four and five toes at the flexor digitorum longus tendons corresponding to the number four and five toes. In this individual who radiographically still has joints that are well maintained at the lesser digits two, three, four, and five at the MTP joints, as well as has a severe bunion deformity and pronation of her right great toe, it would be nice to save her joint. A joint-sparing type procedure, I think, would be better for her. She does have rheumatoid arthritis. She is on Imuran and another agent. I will have to double check which one she is on.      So, I think if she had a Lapidus type procedure, a distal soft tissue modified Torres, that would help with her bunion deformity, possible Randy procedure. If she had a right number two and three metatarsophalangeal joint capsulotomies with corresponding extensor digitorum longus lengthening and extensor digitorum brevis tenotomies of the number two and three toes, as well as PIP resection arthroplasties of the number two and three, this would be helpful. These are joint sparing type procedures. For her number four and five toes, I think she may require a flexor digitorum longus tendon release so there will not be so much flexion tone to the number four and five toes. That is what I propose for her. She is going to see a neurosurgeon in May of this year, I believe, May 17, 2018, so that she can discuss with him whether it is okay to have surgery on her right foot from a neurosurgical standpoint. Otherwise, she is going to see me in a couple of weeks. She was given Millie Jarrett, my surgery schedulers card, so we can coordinate something. Plan:    1) Pre-Op Labs: CBC w/ diff, CMP, PT INR, EKG, CXR, UA w/ reflex, Urine Culture. 2) Contact Millie Jarrett for Pre-op Scheduling for right hammertoe corrections, and right bunionectomy. RTO - Surgical Scheduling    HPI AND EXAMINATION     Robert Barber IS A 46 y.o. female who presents to my outpatient office for follow up 1 year days s/p revision left number four toe PIP resection arthroplasty. At last visit, I recommended to continue activity as tolerated. Patient continues to follow up in [facility] for her trigeminal nerve neuralgia. She experiences facial and occipital nerve pain that radiates through her face. Since having the initial stimulator placed she began to experience sharp bilateral ear pain that is consistent throughout the day. As it regards to her right foot, MS. Kyle Srinivasan reports she experiences burning and sharp pain along the plantar aspect of her metatarsal heads. XR imaging has been reviewed with the patient.  Surgical procedures for intervention have been discussed with the patient and her mother. Patient is currently disabled due to the trigeminal neuralgia. Patient has a h/o RA. Appearance: Alert, well appearing and pleasant patient who is in no distress, oriented to person, place/time, and who follows commands. This patient is accompanied in the       office by her  mother. Psychiatric: Affect and mood are appropriate. Cardiovascular/Peripheral Vascular: Normal Pulses to each hand and foot  Musculoskeletal:  LOCATION: foot - right      Integumentary: No rashes, skin patches, wounds, or abrasions to the right or left legs       Warm and normal color. No regions of expressible drainage. IPK is not present      Gait: normal      Tenderness: moderate tenderness to metatarsal head regions and         Hammer toes present       Motor/Strength/Tone Exam: Normal       Sensory Exam:   Intact Normal Sensation to ankle/foot      Stability Testing: No anterolateral or varus instability of the Ankle or Subtalar Joints               No peroneal tendon instability noted      ROM: Normal ROM noted to ankle/foot      Contractures: No Achilles or Gastrocnemius Contractures      Calf tenderness: Absent for calf or gastrocnemius muscle regions       Soft, supple, non tender, non taut lower extremity compartment  Alignment:     neutral Hindfoot,         mild Metatarsus Adductus Metatarsus          Bunion alignment with toe pronation and mild spring back test.  Wounds/Abrasions:  None present  Extremities:   No embolic phenomena to the toes or hands         No significant edema to the foot and or toes.         Lower extremities are warm and appear well perfused    DVT: No evidence of DVT seen on examination at this time    No calf swelling, no tenderness to calf muscles  Lymphatic:  No Evidence of Lymphedema  Vascular: Medial Border of Tibia Region: Edema is not present         Pulses: Dorsalis Pedis &  Posterior Tibial Pulses : Palpable yes        Varicosities Lower Limbs :  None  noted  Neuro: Negative bilateral Straight leg raise (seated position)    See Musculoskeletal section for pertinent individual extremity examination    No abnormal hand/wrist, foot/ankle, or facial/neck tremors. CHART REVIEW     Past Medical History:   Diagnosis Date    Anemia     Chronic pain     Contact lens/glasses fitting     Frequent UTI 2006    GERD (gastroesophageal reflux disease)     H/O eating disorder     remote    Hip fracture, left ([facility] [address] 2000    Hypertension     Leg length discrepancy     Needs pre-anesthesia assessment     Patient reports a history of high tolerance to pain medicine and prior hisoty of drug use    Post herpetic neuralgia     PUD (peptic ulcer disease)     Rheumatoid arthritis ([facility] [address]     Shingles April 2014    Tooth abscess 10/6/15    Completed Amoxicillin      Current Outpatient Prescriptions   Medication Sig    prochlorperazine (COMPAZINE) 10 mg tablet Take 5 mg by mouth every six (6) hours as needed.  dextroamphetamine-amphetamine (ADDERALL) 10 mg tablet Take 10 mg by mouth three (3) times daily.  oxyCODONE-acetaminophen (PERCOCET)  mg per tablet 1 tab po q 4 hrs prn pain    0.9% sodium chloride solution     medroxyPROGESTERone (PROVERA) 5 mg tablet Take 5 mg by mouth daily.  imipramine (TOFRANIL) 25 mg tablet Take 100 mg by mouth nightly.  CALCIUM CARBONATE/VITAMIN D3 (CALCIUM+D PO) Take  by mouth.  biotin 10,000 mcg cap Take  by mouth.  VITAMIN B COMPLEX PO Take  by mouth.  multivitamin (ONE A DAY) tablet Take 1 Tab by mouth daily.  estradiol (ESTRACE) 0.5 mg tablet Take  by mouth.  lamoTRIgine (LAMICTAL) 100 mg tablet Take  by mouth two (2) times a day.  dronabinol (MARINOL) 5 mg capsule Take 5 mg by mouth two (2) times daily as needed. Indications: HEADACHE    carvedilol (COREG) 6.25 mg tablet Take 6.25 mg by mouth daily.     ondansetron hcl (ZOFRAN, AS HYDROCHLORIDE,) 8 mg tablet Take 8 mg by mouth every eight (8) hours as needed for Nausea.  alendronate (FOSAMAX) 70 mg tablet Take  by mouth every Sunday.  methylphenidate (RITALIN) 10 mg tablet Take  by mouth three (3) times daily.  venlafaxine-SR (EFFEXOR XR) 150 mg capsule Take  by mouth daily.  baclofen (LIORESAL) 10 mg tablet Take 20 mg by mouth four (4) times daily.  pantoprazole (PROTONIX) 40 mg tablet Take 40 mg by mouth daily. Twice a day    predniSONE (DELTASONE) 10 mg tablet Take  by mouth daily (with breakfast).  HYDROmorphone (DILAUDID) 2 mg tablet Take one tablet PO Q 6 HRS as needed for **POST OPERATIVE BREAKTHROUGH PAIN**    HYDROmorphone (DILAUDID) 2 mg tablet TAKE ONE TABLET BY MOUTH Q 6 HRS PRN for POST OPERATIVE BREAKTHROUGH PAIN  **DO NOT FILL BEFORE 1/10/17**  Indications: PAIN    polyethylene glycol (MIRALAX) 17 gram/dose powder Take 17 g by mouth daily.  promethazine (PHENERGAN) 25 mg tablet Take 1 Tab by mouth every six (6) hours as needed for Nausea.  oxyCODONE-acetaminophen (PERCOCET) 5-325 mg per tablet Take 1 Tab by mouth two (2) times a day. Max Daily Amount: 2 Tabs.  oxyCODONE-acetaminophen (PERCOCET) 5-325 mg per tablet Take 1 Tab by mouth three (3) times daily. Max Daily Amount: 3 Tabs.  promethazine (PHENERGAN) 25 mg tablet Take 1 Tab by mouth every six (6) hours as needed for Nausea.  polyethylene glycol (MIRALAX) 17 gram/dose powder Take 17 g by mouth daily.  oxyCODONE-acetaminophen (PERCOCET)  mg per tablet Take 1 Tab by mouth. Take 1/3 tab as needed     No current facility-administered medications for this visit.       Allergies   Allergen Reactions    Aspirin Unknown (comments)     Severe stomach pain and bleeding    Lyrica [Pregabalin] Other (comments)     Slurred speech    Nsaids (Non-Steroidal Anti-Inflammatory Drug) Other (comments)     Hx  Of bleeding ulcers    Sulfa (Sulfonamide Antibiotics) Rash    Tetracycline Hives     Past Surgical History:   Procedure Laterality Date    HX GYN  2012    fibroids    HX ORTHOPAEDIC Left 2012    partial hip replacement    HX ORTHOPAEDIC  2009    attempt bunionectomy    HX ORTHOPAEDIC  10/2015    Dr. Reyes Pulido: Anastasiya Mangle History     Occupational History    Not on file. Social History Main Topics    Smoking status: Former Smoker     Quit date: 7/20/2015    Smokeless tobacco: Never Used    Alcohol use No    Drug use: No      Comment: Prior history of drug use    Sexual activity: Not on file     Family History   Problem Relation Age of Onset    Arthritis-osteo Other     Stroke Mother     Hypertension Mother        REVIEW OF SYSTEMS : 4/10/2018  ALL BELOW ARE Negative except : SEE HPI       Constitutional: Negative for fever, chills and weight loss. Neg Weigh Loss  Cardiovascular: Negative for chest pain, claudication and leg swelling. SOB, HOFFMANN   Gastrointestinal: Negative for  pain, N/V/D/C, Blood in stool or urine,dysuria, hematuria,        Incontinence, pelvic pain  Musculoskeletal: see HPI. Neurological: Negative for dizziness and weakness. Negative for headaches,Visual Changes, Confusion, Seizures,   Psychiatric/Behavioral: Negative for depression, memory loss and substance abuse. Extremities:  Negative for  hair changes, rash or skin lesion changes. Hematologic: Negative for Bleeding problems, bruising, pallor or swollen lymph nodes. Peripheral Vascular: No calf pain, vascular vein tenderness to calf pain              No calf throbbing, posterior knee throbbing pain    DIAGNOSTIC IMAGING     FOOT X RAYS 3 VIEWS Right   4/10/2018    WEIGHT BEARING    X RAYS AT [address] [place] [address]  4/10/2018      Bones: No fractures or dislocations.  No focal osteolytic or osteoblastic process    Bone Spurs: No significant bone spurs   Alignment: Bunion Alignment: WNL    severe Hallux Valgus Alignment, moderate increased IM ANGLE (1st/2nd)   Metatarsus Adductus is mild   Midfoot Alignment is WNL. Joint: Slightly Incongruent  Soft Tissues:  , Soft Tissues Mild swelling dorsal midfoot     No ankle joint effusion in lateral projection. Mineralization: Suggests Osteopenia    I have personally reviewed the results of the above study. The interpretation of this study is my professional opinion    Amaris Roberson MD  4/10/2018  11:52 AM           .            Written by Moncia Rios, as dictated by Miladis Rock MD. I, DrEvelin, Miladis Rock MD, confirm that all documentation is accurate.

## 2018-04-10 NOTE — MR AVS SNAPSHOT
[address] [place] [address], [address] [facility] 
[phone] Patient: Dre Grant MRN: [id number] :1965 Visit Information Date & Time Provider Department Dept. Phone Encounter #  
 4/10/2018 10:00 AM Dianne Oakley, 27 [facility] [phone] Upcoming Health Maintenance Date Due Hepatitis C Screening 1965 DTaP/Tdap/Td series (1 - Tdap) 3/16/1985 PAP AKA CERVICAL CYTOLOGY 3/16/1985 BREAST CANCER SCRN MAMMOGRAM 3/16/2014 FOBT Q 1 YEAR AGE 50-75 3/16/2014 Influenza Age 5 to Adult 2017 MEDICARE YEARLY EXAM 3/20/2018 Allergies as of 4/10/2018  Review Complete On: 4/10/2018 By: Taylor Bruce LPN Severity Noted Reaction Type Reactions Aspirin    Unknown (comments) Severe stomach pain and bleeding Lyrica [Pregabalin]  10/12/2015    Other (comments) Slurred speech Nsaids (Non-steroidal Anti-inflammatory Drug)  10/12/2015    Other (comments) Hx  Of bleeding ulcers Sulfa (Sulfonamide Antibiotics)    Rash Tetracycline    Hives Current Immunizations  Never Reviewed No immunizations on file. Not reviewed this visit You Were Diagnosed With   
  
 Codes Comments Bunion, right foot    -  Primary ICD-10-CM: M21.611 ICD-9-CM: 727.1 Pre-operative cardiovascular examination     ICD-10-CM: Z01.810 ICD-9-CM: V72.81 Pre-operative respiratory examination     ICD-10-CM: R53.505 ICD-9-CM: V72.82 Blood tests prior to treatment or procedure     ICD-10-CM: Z01.812 ICD-9-CM: V72.63 Preop examination     ICD-10-CM: F42.389 ICD-9-CM: V72.84 Vitals BP Pulse Temp Height(growth percentile) Weight(growth percentile) SpO2  
 116/86 73 96.4 °F (35.8 °C) (Oral) 5' 3.5\" (1.613 m) 107 lb (48.5 kg) 93% BMI OB Status Smoking Status 18.66 kg/m2 Postmenopausal Former Smoker BMI and BSA Data Body Mass Index Body Surface Area  
 18.66 kg/m 2 1.47 m 2 Preferred Pharmacy Pharmacy Name Phone [phone] [facility], [address] Your Updated Medication List  
  
   
This list is accurate as of 4/10/18 11:12 AM.  Always use your most recent med list.  
  
  
  
  
 0.9% sodium chloride solution ADDERALL 10 mg tablet Generic drug:  dextroamphetamine-amphetamine Take 10 mg by mouth three (3) times daily. baclofen 10 mg tablet Commonly known as:  LIORESAL Take 20 mg by mouth four (4) times daily. biotin 10,000 mcg Cap Take  by mouth. CALCIUM+D PO Take  by mouth. COMPAZINE 10 mg tablet Generic drug:  prochlorperazine Take 5 mg by mouth every six (6) hours as needed. COREG 6.25 mg tablet Generic drug:  carvedilol Take 6.25 mg by mouth daily. EFFEXOR  mg capsule Generic drug:  venlafaxine-SR Take  by mouth daily. estradiol 0.5 mg tablet Commonly known as:  ESTRACE Take  by mouth. FOSAMAX 70 mg tablet Generic drug:  alendronate Take  by mouth every Sunday. * HYDROmorphone 2 mg tablet Commonly known as:  DILAUDID Take one tablet PO Q 6 HRS as needed for **POST OPERATIVE BREAKTHROUGH PAIN**  
  
 * HYDROmorphone 2 mg tablet Commonly known as:  DILAUDID  
TAKE ONE TABLET BY MOUTH Q 6 HRS PRN for POST OPERATIVE BREAKTHROUGH PAIN **DO NOT FILL BEFORE 1/10/17**  Indications: PAIN  
  
 imipramine 25 mg tablet Commonly known as:  TOFRANIL Take 100 mg by mouth nightly. LaMICtal 100 mg tablet Generic drug:  lamoTRIgine Take  by mouth two (2) times a day. MARINOL 5 mg capsule Generic drug:  dronabinol Take 5 mg by mouth two (2) times daily as needed. Indications: HEADACHE  
  
 medroxyPROGESTERone 5 mg tablet Commonly known as:  PROVERA Take 5 mg by mouth daily. multivitamin tablet Commonly known as:  ONE A DAY Take 1 Tab by mouth daily. * PERCOCET  mg per tablet Generic drug:  oxyCODONE-acetaminophen Take 1 Tab by mouth. Take 1/3 tab as needed * oxyCODONE-acetaminophen  mg per tablet Commonly known as:  PERCOCET  
1 tab po q 4 hrs prn pain * oxyCODONE-acetaminophen 5-325 mg per tablet Commonly known as:  PERCOCET Take 1 Tab by mouth three (3) times daily. Max Daily Amount: 3 Tabs. * oxyCODONE-acetaminophen 5-325 mg per tablet Commonly known as:  PERCOCET Take 1 Tab by mouth two (2) times a day. Max Daily Amount: 2 Tabs. * polyethylene glycol 17 gram/dose powder Commonly known as:  Orysia [place] Take 17 g by mouth daily. * polyethylene glycol 17 gram/dose powder Commonly known as:  Orysia [place] Take 17 g by mouth daily. predniSONE 10 mg tablet Commonly known as:  Tino Alvarez Take  by mouth daily (with breakfast). * promethazine 25 mg tablet Commonly known as:  PHENERGAN Take 1 Tab by mouth every six (6) hours as needed for Nausea. * promethazine 25 mg tablet Commonly known as:  PHENERGAN Take 1 Tab by mouth every six (6) hours as needed for Nausea. PROTONIX 40 mg tablet Generic drug:  pantoprazole Take 40 mg by mouth daily. Twice a day RITALIN 10 mg tablet Generic drug:  methylphenidate HCl Take  by mouth three (3) times daily. VITAMIN B COMPLEX PO Take  by mouth. ZOFRAN (AS HYDROCHLORIDE) 8 mg tablet Generic drug:  ondansetron hcl Take 8 mg by mouth every eight (8) hours as needed for Nausea. * Notice: This list has 10 medication(s) that are the same as other medications prescribed for you. Read the directions carefully, and ask your doctor or other care provider to review them with you. We Performed the Following AMB POC XRAY, FOOT; COMPLETE, 3+ VIEW [40279 CPT(R)] Patient Instructions Please follow up with Amadou De Leon for Pre-Op Scheduling. You are advised to contact us if your condition worsens. Metatarsalgia: Care Instructions Your Care Instructions Metatarsalgia (say \"met-uh-tar-SAL-allyson-thom\") is pain in the ball of the foot. It sometimes spreads to the toes. The ball of the foot is the bottom of the foot, where the toes join the foot. While walking might be very painful, the pain is usually not a sign of a serious or permanent problem. But any pain can affect your life, so it is important that you treat it. Pain in this area can be caused by many things. For example, you may have this pain if you stand or walk a lot or wear tight shoes or high heels. Your pain might be caused by inflammation of a joint (capsulitis). It is most common in the joint at the base of the second toe, near the ball of the foot. Capsulitis happens when ligaments that go around the joint become inflamed. The joint may be swollen. It may feel like there is a small rock under it. You may have had an X-ray if your doctor wanted to make sure a more serious problem is not causing your pain. Treatment may consist of home care, such as rest, wearing different shoes, and taking over-the-counter pain medicines. It can take months for the pain to go away. If the ligaments around a joint are torn, or if a toe has started to slant toward the toe next to it, you may need surgery. Follow-up care is a key part of your treatment and safety. Be sure to make and go to all appointments, and call your doctor if you are having problems. It's also a good idea to know your test results and keep a list of the medicines you take. How can you care for yourself at home? · Rest your foot. If an activity is causing the pain, find another one to do that does not put so much pressure on your foot.  
· Put ice or a cold pack on your foot when it hurts or after you've done something that usually causes pain. Do this for 10 to 20 minutes at a time. Put a thin cloth between the ice and your skin. · Take an over-the-counter pain medicine, such as acetaminophen (Tylenol), ibuprofen (Advil, Motrin), or naproxen (Aleve). Be safe with medicines. Read and follow all instructions on the label. · Do not take two or more pain medicines at the same time unless the doctor told you to. Many pain medicines have acetaminophen, which is Tylenol. Too much acetaminophen (Tylenol) can be harmful. · Wear roomy, comfortable shoes. · If your doctor recommends it, use special pads to relieve the pressure on your foot. The pads may fit into your shoes, or they may stick to the soles of your feet. · Ask your doctor about using orthotic shoe devices. These are molded pieces of rubber, leather, metal, plastic, or other synthetic material that are inserted into a shoe. · Wear shoes with good arch support. · Try not to wear high heels or narrow shoes. · Follow your doctor's or physical therapist's directions for exercise. When should you call for help? Watch closely for changes in your health, and be sure to contact your doctor if: 
? · You have new or worse symptoms. ? · You do not get better as expected. Where can you learn more? Go to http://roldan-yudith.info/. Enter B580 in the search box to learn more about \"Metatarsalgia: Care Instructions. \" Current as of: March 21, 2017 Content Version: 11.4 © 3856-7602 [employer]. Care instructions adapted under license by [employer] (which disclaims liability or warranty for this information). If you have questions about a medical condition or this instruction, always ask your healthcare professional. [address] any warranty or liability for your use of this information. Introducing [facility]! Dear Enedina Perez: Thank you for requesting a [employer] account. Our records indicate that you already have an active [employer] account. You can access your account anytime at [email or web address] Did you know that you can access your hospital and ER discharge instructions at any time in [employer]? You can also review all of your test results from your hospital stay or ER visit. Additional Information If you have questions, please visit the Frequently Asked Questions section of the [employer] website at [email or web address] Remember, [employer] is NOT to be used for urgent needs. For medical emergencies, dial 911. Now available from your iPhone and Android! Please provide this summary of care documentation to your next provider. Your primary care clinician is listed as Cinthya Alcaraz. If you have any questions after today's visit, please call [phone].

## 2021-01-26 ENCOUNTER — TELEPHONE (OUTPATIENT)
Dept: ORTHOPEDIC SURGERY | Age: 56
End: 2021-01-26

## 2021-01-26 NOTE — TELEPHONE ENCOUNTER
NEW PATIENT SCHEDULED FOR 2/12/2021-- WANTS TO BE SEEN SOONER FOR HAND/FINGER INJURY. STATES SHE HAS A LEFT HAND PINKY FINGER FX FROM A MONTH AGO. SHE WENT TO [place] [facility]  [address] ([phone]). PT DESCRIBES SOMETHING IS POKING OUT FROM THE SIDE OF HER FINGER. PT SAID SHE'S WILLING TO GO TO ANY LOCATION.     THE PATIENT CAN BE REACHED AT P#[phone]

## 2021-01-28 NOTE — TELEPHONE ENCOUNTER
Called patient to move appt up, no answer. If she calls back please see if she wants the 2/1 10:15 appt, we can double book for her.

## 2021-02-01 ENCOUNTER — OFFICE VISIT (OUTPATIENT)
Dept: ORTHOPEDIC SURGERY | Age: 56
End: 2021-02-01
Payer: MEDICARE

## 2021-02-01 VITALS
SYSTOLIC BLOOD PRESSURE: 124 MMHG | HEART RATE: 75 BPM | HEIGHT: 64 IN | TEMPERATURE: 97.5 F | RESPIRATION RATE: 16 BRPM | DIASTOLIC BLOOD PRESSURE: 84 MMHG | WEIGHT: 110 LBS | BODY MASS INDEX: 18.78 KG/M2

## 2021-02-01 DIAGNOSIS — S63.637S: Primary | ICD-10-CM

## 2021-02-01 DIAGNOSIS — M79.642 BILATERAL HAND PAIN: ICD-10-CM

## 2021-02-01 DIAGNOSIS — M79.645 FINGER PAIN, LEFT: ICD-10-CM

## 2021-02-01 DIAGNOSIS — M79.641 BILATERAL HAND PAIN: ICD-10-CM

## 2021-02-01 DIAGNOSIS — L03.012 CHRONIC PARONYCHIA OF FINGER OF LEFT HAND: ICD-10-CM

## 2021-02-01 PROCEDURE — 20600 DRAIN/INJ JOINT/BURSA W/O US: CPT | Performed by: ORTHOPAEDIC SURGERY

## 2021-02-01 PROCEDURE — G8420 CALC BMI NORM PARAMETERS: HCPCS | Performed by: ORTHOPAEDIC SURGERY

## 2021-02-01 PROCEDURE — G8432 DEP SCR NOT DOC, RNG: HCPCS | Performed by: ORTHOPAEDIC SURGERY

## 2021-02-01 PROCEDURE — 99214 OFFICE O/P EST MOD 30 MIN: CPT | Performed by: ORTHOPAEDIC SURGERY

## 2021-02-01 PROCEDURE — G8427 DOCREV CUR MEDS BY ELIG CLIN: HCPCS | Performed by: ORTHOPAEDIC SURGERY

## 2021-02-01 PROCEDURE — 3017F COLORECTAL CA SCREEN DOC REV: CPT | Performed by: ORTHOPAEDIC SURGERY

## 2021-02-01 PROCEDURE — 73130 X-RAY EXAM OF HAND: CPT | Performed by: ORTHOPAEDIC SURGERY

## 2021-02-01 RX ORDER — FOLIC ACID 1 MG/1
TABLET ORAL DAILY
COMMUNITY

## 2021-02-01 RX ORDER — HYDROXYCHLOROQUINE SULFATE 200 MG/1
200 TABLET, FILM COATED ORAL 2 TIMES DAILY
COMMUNITY

## 2021-02-01 RX ORDER — LEVETIRACETAM 1000 MG/1
1500 TABLET, FILM COATED ORAL DAILY
COMMUNITY

## 2021-02-01 RX ORDER — METHOTREXATE 2.5 MG/1
15 TABLET ORAL
COMMUNITY

## 2021-02-01 RX ORDER — AZATHIOPRINE 50 MG/1
50 TABLET ORAL 3 TIMES DAILY
COMMUNITY

## 2021-02-01 RX ORDER — GABAPENTIN 600 MG/1
600 TABLET ORAL 3 TIMES DAILY
COMMUNITY

## 2021-02-01 RX ORDER — OXYCODONE HYDROCHLORIDE 10 MG/1
5 TABLET ORAL
COMMUNITY
End: 2021-03-26

## 2021-02-01 NOTE — PROGRESS NOTES
Melinda Dotson is a 63 y.o. female right handed unspecified employment. Worker's Compensation and legal considerations: none filed. Vitals:    02/01/21 1026   BP: 124/84   Pulse: 75   Resp: 16   Temp: 97.5 °F (36.4 °C)   TempSrc: Temporal   Weight: 110 lb (49.9 kg)   Height: 5' 3.5\" (1.613 m)   PainSc:   4   PainLoc: Hand           Chief Complaint   Patient presents with    Hand Pain     bilateral         HPI: Patient presents today with complaints of left little finger pain 2 months after spraining it. She also reports chronic symptoms about her eponychial him on her index finger proximal to the nail on the left as well as similar symptoms on the right. She has seen a dermatologist twice for this in the past but did not follow-up. She also has a history of Raynaud's.     Date of onset: December 2020    Injury: Yes: Comment: Fall    Prior Treatment:  Yes: Comment: Urgent care for left little fingerAnd antibiotics for left index finger    Numbness/ Tingling: No      ROS: Review of Systems - General ROS: negative  Psychological ROS: negative  ENT ROS: negative  Allergy and Immunology ROS: negative  Hematological and Lymphatic ROS: negative  Respiratory ROS: no cough, shortness of breath, or wheezing  Cardiovascular ROS: no chest pain or dyspnea on exertion  Gastrointestinal ROS: no abdominal pain, change in bowel habits, or black or bloody stools  Musculoskeletal ROS: positive for - pain in finger - bilateral  Neurological ROS: negative  Dermatological ROS: negative    Past Medical History:   Diagnosis Date    Anemia     Chronic pain     Contact lens/glasses fitting     Frequent UTI 2006    GERD (gastroesophageal reflux disease)     H/O eating disorder     remote    Hip fracture, left ([address] 2000    Hypertension     Leg length discrepancy     Needs pre-anesthesia assessment     Patient reports a history of high tolerance to pain medicine and prior hisoty of drug use    Post herpetic neuralgia     PUD (peptic ulcer disease)     [facility] arthritis [facility] [address]     [facility]     Shingles April 2014    Tooth abscess 10/6/15    Completed Amoxicillin        Past Surgical History:   Procedure Laterality Date    HX GYN  2012    fibroids    HX ORTHOPAEDIC Left 2012    partial hip replacement    HX ORTHOPAEDIC  2009    attempt bunionectomy    HX ORTHOPAEDIC  10/2015    Dr. Cher Asher: First Metatarsophylangeal Fusion,etc       Current Outpatient Medications   Medication Sig Dispense Refill    levETIRAcetam (Keppra) 1,000 mg tablet Take 1,500 mg by mouth daily.  gabapentin (NEURONTIN) 600 mg tablet Take 600 mg by mouth three (3) times daily.  methotrexate (RHEUMATREX) 2.5 mg tablet Take 15 mg by mouth.  folic acid (FOLVITE) 1 mg tablet Take  by mouth daily.  oxyCODONE IR (ROXICODONE) 10 mg tab immediate release tablet Take 5 mg by mouth.  hydrOXYchloroQUINE (PLAQUENIL) 200 mg tablet Take 200 mg by mouth two (2) times a day.  azaTHIOprine (Imuran) 50 mg tablet Take 50 mg by mouth three (3) times daily.  prochlorperazine (COMPAZINE) 10 mg tablet Take 5 mg by mouth every six (6) hours as needed.  dextroamphetamine-amphetamine (ADDERALL) 10 mg tablet Take 10 mg by mouth three (3) times daily.  medroxyPROGESTERone (PROVERA) 5 mg tablet Take 5 mg by mouth daily.  imipramine (TOFRANIL) 25 mg tablet Take 300 mg by mouth nightly.  CALCIUM CARBONATE/VITAMIN D3 (CALCIUM+D PO) Take  by mouth.  VITAMIN B COMPLEX PO Take  by mouth.  multivitamin (ONE A DAY) tablet Take 1 Tab by mouth daily.  estradiol (ESTRACE) 0.5 mg tablet Take  by mouth.  dronabinol (MARINOL) 5 mg capsule Take 5 mg by mouth two (2) times daily as needed. Indications: HEADACHE      carvedilol (COREG) 6.25 mg tablet Take 6.25 mg by mouth two (2) times a day.  alendronate (FOSAMAX) 70 mg tablet Take  by mouth every Sunday.       venlafaxine-SR (EFFEXOR XR) 150 mg capsule Take  by mouth daily.  baclofen (LIORESAL) 10 mg tablet Take 20 mg by mouth four (4) times daily.  pantoprazole (PROTONIX) 40 mg tablet Take 40 mg by mouth daily. Twice a day      HYDROmorphone (DILAUDID) 2 mg tablet Take one tablet PO Q 6 HRS as needed for **POST OPERATIVE BREAKTHROUGH PAIN** 50 Tab 0    HYDROmorphone (DILAUDID) 2 mg tablet TAKE ONE TABLET BY MOUTH Q 6 HRS PRN for POST OPERATIVE BREAKTHROUGH PAIN  **DO NOT FILL BEFORE 1/10/17**  Indications: PAIN 50 Tab 0    polyethylene glycol (MIRALAX) 17 gram/dose powder Take 17 g by mouth daily. 255 g 1    promethazine (PHENERGAN) 25 mg tablet Take 1 Tab by mouth every six (6) hours as needed for Nausea. 30 Tab 0    oxyCODONE-acetaminophen (PERCOCET) 5-325 mg per tablet Take 1 Tab by mouth two (2) times a day. Max Daily Amount: 2 Tabs. 40 Tab 0    oxyCODONE-acetaminophen (PERCOCET) 5-325 mg per tablet Take 1 Tab by mouth three (3) times daily. Max Daily Amount: 3 Tabs. 60 Tab 0    oxyCODONE-acetaminophen (PERCOCET)  mg per tablet 1 tab po q 4 hrs prn pain 40 Tab 0    0.9% sodium chloride solution       promethazine (PHENERGAN) 25 mg tablet Take 1 Tab by mouth every six (6) hours as needed for Nausea. 30 Tab 0    polyethylene glycol (MIRALAX) 17 gram/dose powder Take 17 g by mouth daily. 255 g 1    biotin 10,000 mcg cap Take  by mouth.  lamoTRIgine (LAMICTAL) 100 mg tablet Take  by mouth two (2) times a day.  ondansetron hcl (ZOFRAN, AS HYDROCHLORIDE,) 8 mg tablet Take 8 mg by mouth every eight (8) hours as needed for Nausea.  methylphenidate (RITALIN) 10 mg tablet Take  by mouth three (3) times daily.  predniSONE (DELTASONE) 10 mg tablet Take  by mouth daily (with breakfast).  oxyCODONE-acetaminophen (PERCOCET)  mg per tablet Take 1 Tab by mouth.  Take 1/3 tab as needed         Allergies   Allergen Reactions    Aspirin Unknown (comments)     Severe stomach pain and bleeding    Lyrica [Pregabalin] Other (comments)     Slurred speech    Nsaids (Non-Steroidal Anti-Inflammatory Drug) Other (comments)     Hx  Of bleeding ulcers    Sulfa (Sulfonamide Antibiotics) Rash    Tetracycline Hives           PE:     Physical Exam  Vitals signs and nursing note reviewed. Constitutional:       General: She is not in acute distress. Appearance: Normal appearance. She is not ill-appearing. Eyes:      Pupils: Pupils are equal, round, and reactive to light. Neck:      Musculoskeletal: Normal range of motion and neck supple. Cardiovascular:      Pulses: Normal pulses. Pulmonary:      Effort: Pulmonary effort is normal. No respiratory distress. Abdominal:      General: Abdomen is flat. There is no distension. Musculoskeletal:         General: Swelling, tenderness and deformity present. No signs of injury. Right lower leg: No edema. Left lower leg: No edema. Skin:     General: Skin is warm and dry. Capillary Refill: Capillary refill takes less than 2 seconds. Findings: No bruising or erythema. Neurological:      General: No focal deficit present. Mental Status: She is alert and oriented to person, place, and time. Cranial Nerves: No cranial nerve deficit. Sensory: No sensory deficit. Psychiatric:         Mood and Affect: Mood normal.         Behavior: Behavior normal.            Left hand: There is edema and tenderness to palpation about the small finger PIP joint. There is also some mild ulnar deviation about the joint. There is exquisite tenderness to palpation about the radial collateral ligament of the PIP joint on the small finger. Range of motion in flexion and extension is near full however tight with flexion. There is mild erythema noted about the eponychium of the index finger on the left as well as the index finger on the right. Imagin/1/2021 films of bilateral hands does not show any fracture or dislocation.   There is an old healed fracture of the fifth metacarpal on the left side. Additionally there is noted soft tissue edema about the PIP joint of the left small finger. ICD-10-CM ICD-9-CM    1. Sprain of interphalangeal joint of left little finger, sequela  S63.637S 905.7 DRAIN/INJECT SMALL JOINT/BURSA     842.13 triamcinolone acetonide (KENALOG) 10 mg/mL injection 5 mg      REFERRAL TO OCCUPATIONAL THERAPY   2. 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 Finger pain, left  M79.645 729.5 CANCELED: AMB POC XRAY, FINGER(S), 2+ VIEWS   4. Bilateral hand pain  M79.641 729.5 AMB POC XRAY, HAND; 3+ VIEWS    M79.642  AMB POC XRAY, HAND; 3+ VIEWS         Plan:     Discussed the importance of following up with the dermatologist.    Left small finger PIP joint injection. OT for range of motion exercises, edema control, and desensitization. Fadi straps for left little finger and ring finger. Follow-up and Dispositions    · Return for Reevaluation, OT follow-up, and dermatology follow-up. Connor Perez Plan was reviewed with patient, who verbalized agreement and understanding of the plan    [facility] NOTE        Chart reviewed for the following:   Karl GARCIA DO, have reviewed the History, Physical and updated the Allergic reactions for [address] performed immediately prior to start of procedure:   pM GARCIA DO, have performed the following reviews on Shania Mittal prior to the start of the procedure:            * Patient was identified by name and date of birth   * Agreement on procedure being performed was verified  * Risks and Benefits explained to the patient  * Procedure site verified and marked as necessary  * Patient was positioned for comfort  * Consent was signed and verified     Time: 10:59 AM      Date of procedure: 2/1/2021    Procedure performed by:   Mp Ovalles DO    Provider assisted by: Trish Martinez LPN    Patient assisted by: self    How tolerated by patient: tolerated the procedure well with no complications    Post Procedural Pain Scale: 0 - No Hurt    Comments: none    Procedure:  After consent was obtained, using sterile technique the joint was prepped. Local anesthetic used: 1% lidocaine. Kenalog 5 mg and was then injected and the needle withdrawn. The procedure was well tolerated. The patient is asked to continue to rest the area for a few more days before resuming regular activities. It may be more painful for the first 1-2 days. Watch for fever, or increased swelling or persistent pain in the joint. Call or return to clinic prn if such symptoms occur or there is failure to improve as anticipated.

## 2021-02-03 ENCOUNTER — OFFICE VISIT (OUTPATIENT)
Dept: ORTHOPEDIC SURGERY | Age: 56
End: 2021-02-03
Payer: MEDICARE

## 2021-02-03 VITALS
DIASTOLIC BLOOD PRESSURE: 88 MMHG | SYSTOLIC BLOOD PRESSURE: 140 MMHG | HEIGHT: 64 IN | HEART RATE: 73 BPM | RESPIRATION RATE: 16 BRPM | TEMPERATURE: 97 F | BODY MASS INDEX: 18.78 KG/M2 | WEIGHT: 110 LBS

## 2021-02-03 DIAGNOSIS — M20.42 HAMMER TOE OF LEFT FOOT: ICD-10-CM

## 2021-02-03 DIAGNOSIS — M79.675 PAIN AND SWELLING OF TOE OF LEFT FOOT: Primary | ICD-10-CM

## 2021-02-03 DIAGNOSIS — M79.89 PAIN AND SWELLING OF TOE OF LEFT FOOT: Primary | ICD-10-CM

## 2021-02-03 DIAGNOSIS — M79.672 LEFT FOOT PAIN: ICD-10-CM

## 2021-02-03 PROCEDURE — G8427 DOCREV CUR MEDS BY ELIG CLIN: HCPCS | Performed by: ORTHOPAEDIC SURGERY

## 2021-02-03 PROCEDURE — G8432 DEP SCR NOT DOC, RNG: HCPCS | Performed by: ORTHOPAEDIC SURGERY

## 2021-02-03 PROCEDURE — G8420 CALC BMI NORM PARAMETERS: HCPCS | Performed by: ORTHOPAEDIC SURGERY

## 2021-02-03 PROCEDURE — 73630 X-RAY EXAM OF FOOT: CPT | Performed by: ORTHOPAEDIC SURGERY

## 2021-02-03 PROCEDURE — 99214 OFFICE O/P EST MOD 30 MIN: CPT | Performed by: ORTHOPAEDIC SURGERY

## 2021-02-03 PROCEDURE — 3017F COLORECTAL CA SCREEN DOC REV: CPT | Performed by: ORTHOPAEDIC SURGERY

## 2021-02-03 NOTE — PROGRESS NOTES
AMBULATORY PROGRESS NOTE      Patient: Melinda Dotson             MRN: [id number]     SSN: [id number] Body mass index is 19.18 kg/m². YOB: 1965     AGE: 63 y.o. EX: female    PCP: Marc Diaz MD       IMPRESSION //  DIAGNOSIS AND TREATMENT PLAN      DIAGNOSES  1. Pain and swelling of toe of left foot    2. Left foot pain    3. Hammer toe of left foot        Orders Placed This Encounter    [23553] Foot Min 3V     Order Specific Question:   Weight bearing? Answer: Yes    CBC WITH AUTOMATED DIFF     Standing Status:   Future     Standing Expiration Date:   8/3/2021    C REACTIVE PROTEIN, QT     Standing Status:   Future     Standing Expiration Date:   8/3/2021    SED RATE (ESR)     Standing Status:   Future     Standing Expiration Date:   2/8/9548    METABOLIC PANEL, COMPREHENSIVE     Standing Status:   Future     Standing Expiration Date:   8/3/2021        . Medications     None           Melinda Dotson has some pain discomfort, to the left fourth toe which left fourth toe was trying to curl under the third toe at the PIP region. She had a remote left previous fourth hammertoe procedure in the past, but now the toes curling under the third toe. She also has a region of point tenderness to the distal portion of her left great toe plantar portion of the great toe distal phalanx which is a firm fullness that is palpated is discrete, distinct. X-rays 3 views, of her left foot weightbearing: Show severe degenerative changes, across her subtalar region, where she had remote calcaneus fracture and she also has flattening of Boehler's angle and has some anterior ankle impingement bony impingement. There is postop changes, from her left great toe first MTP arthrodesis, denies osteopenia throughout her midfoot, and some root is residual hammering of the lesser digits and this lateral radiographic x-ray.   Postop changes, from multiple metatarsal head resection arthroplasties that were performed in the past from her rheumatologic forefoot reconstructive surgery    The AP left foot and oblique left foot, shows a solid left great toe MTP arthrodesis with a dorsal plate in place. There are some valgus alignment, of the left great toe IP region. She does have significant swelling, to the great toe dorsal aspect, the great toe centered over the left great toe IP region. On get some blood work on her. I see no signs infection on her x-ray, but we will going get some blood work on her. Inflammatory blood work. Otherwise surgically, she may require a left great toe IP arthrodesis, realignment arthrodesis, she has valgus alignment of the left great toe at the IP region some OA changes and she also may benefit from a left fourth toe revision PIP surgical procedure in order to straighten the toe. Revision hammertoe procedure but transfixing this with a probably a 2.5 Jorgensen screw going across the IP joints from distal to proximal from the distal phalanx of the proximal phalanx of his left fourth toe to better align the toe. PLAN:    1. Bloodwork: CBC w/ diff, CRP, ESR, CMP ([place])  2. Discussed surgical intervention with patient; she would like to have surgery sometime in March and will discuss with her family first     RTO-after bloodwork      HPI //  OBJECTIVE EXAMINATION      Hakeem Vincent IS A 63 y.o. female who presents to my outpatient office for evaluation of: left great toe pain. Of note, patient has h/o of right heel fracture which required four different surgeries. Today in the office, patient continues to endorse heel pain, but her chief complaint is forefoot pain, specifically her great toe. She reports that she feels as though something wants to come out of her great toe. She also presents with profound swelling of her great toe, which she has had for the last 7-8 months.  She also reports pain of her 4th toe and visually presents with a medial rotational deformity at the PIP joint. She has 3/4 length gel inserts in her [employer] tennis shoes. She feels as though the pain in her forefoot has worsened over the last few weeks. Patient mentions that she had a brain procedure for neuralgia done at [facility]. She is taking several different nerve medications for a combination of trigeminal and supraorbital pain. Visit Vitals  BP (!) 140/88   Pulse 73   Temp 97 °F (36.1 °C) (Temporal)   Resp 16   Ht 5' 3.5\" (1.613 m)   Wt 110 lb (49.9 kg)   BMI 19.18 kg/m²       ANKLE/FOOT left    Psychiatry: Alert, oriented x 3 (name,place,time of day); speech normal in context and clarity, memory intact grossly, no involuntary movements - tremors, no dementia  Gait: normal  Tenderness: mild plantar great toe   Cutaneous: firm fullness to plantar part of great toe, distal phalanx; 4ht toe is angulating under the 3rd toe medial rotational deformity of 4th great toe at the PIP  Joint Motion: WNL  Joint / Tendon Stability: No Ankle or Subtalar instability or joint laxity. No peroneal sublux ability or dislocation  Alignment: neutral Hindfoot, none Metatarsus Adductus Metatarsus. Neuro Motor/Sensory: NL/NL,  Vascular: NL foot/ankle pulses,   Lymphatics: No extremity lymphedema, No calf swelling, no tenderness to calf muscles. CHART REVIEW     There is no problem list on file for this patient. Kierra Lewis has been experiencing pain and discomfort confirmed as outlined in the pain assessment outlined below. Pain Assessment  2/3/2021   Location of Pain Foot   Location Modifiers Left   Severity of Pain 6   Quality of Pain Throbbing; Sharp;Dull   Quality of Pain Comment -   Duration of Pain Persistent   Frequency of Pain Constant   Date Pain First Started -   Aggravating Factors Walking   Limiting Behavior Yes   Relieving Factors Elevation   Relieving Factors Comment -   Result of Injury No        Kierra Lewis  has a past medical history of Anemia, Chronic pain, Contact lens/glasses fitting, Frequent UTI (2006), GERD (gastroesophageal reflux disease), H/O eating disorder, Hip fracture, left ([address] (2000), Hypertension, Leg length discrepancy, Needs pre-anesthesia assessment, Post herpetic neuralgia, PUD (peptic ulcer disease), Rheumatoid arthritis ([address] Rheumatoid arthritis [facility]), Shingles (April 2014), and Tooth abscess (10/6/15). Patients is employed at:         Past Medical History:   Diagnosis Date    Anemia     Chronic pain     Contact lens/glasses fitting     Frequent UTI 2006    GERD (gastroesophageal reflux disease)     H/O eating disorder     remote    Hip fracture, left ([address] 2000    Hypertension     Leg length discrepancy     Needs pre-anesthesia assessment     Patient reports a history of high tolerance to pain medicine and prior hisoty of drug use    Post herpetic neuralgia     PUD (peptic ulcer disease)     [facility]     Rheumatoid arthritis ([facility]     Shingles April 2014    Tooth abscess 10/6/15    Completed Amoxicillin      Past Surgical History:   Procedure Laterality Date    HX GYN  2012    fibroids    HX ORTHOPAEDIC Left 2012    partial hip replacement    HX ORTHOPAEDIC  2009    attempt bunionectomy    HX ORTHOPAEDIC  10/2015    Dr. Cher Asher: First Metatarsophylangeal Fusion,etc     Current Outpatient Medications   Medication Sig    levETIRAcetam (Keppra) 1,000 mg tablet Take 1,500 mg by mouth daily.  gabapentin (NEURONTIN) 600 mg tablet Take 600 mg by mouth three (3) times daily.  methotrexate (RHEUMATREX) 2.5 mg tablet Take 15 mg by mouth.  folic acid (FOLVITE) 1 mg tablet Take  by mouth daily.  oxyCODONE IR (ROXICODONE) 10 mg tab immediate release tablet Take 5 mg by mouth.  hydrOXYchloroQUINE (PLAQUENIL) 200 mg tablet Take 200 mg by mouth two (2) times a day.  azaTHIOprine (Imuran) 50 mg tablet Take 50 mg by mouth three (3) times daily.     prochlorperazine (COMPAZINE) 10 mg tablet Take 5 mg by mouth every six (6) hours as needed.  polyethylene glycol (MIRALAX) 17 gram/dose powder Take 17 g by mouth daily.  dextroamphetamine-amphetamine (ADDERALL) 10 mg tablet Take 10 mg by mouth three (3) times daily.  0.9% sodium chloride solution     medroxyPROGESTERone (PROVERA) 5 mg tablet Take 5 mg by mouth daily.  imipramine (TOFRANIL) 25 mg tablet Take 300 mg by mouth nightly.  polyethylene glycol (MIRALAX) 17 gram/dose powder Take 17 g by mouth daily.  CALCIUM CARBONATE/VITAMIN D3 (CALCIUM+D PO) Take  by mouth.  multivitamin (ONE A DAY) tablet Take 1 Tab by mouth daily.  estradiol (ESTRACE) 0.5 mg tablet Take  by mouth.  dronabinol (MARINOL) 5 mg capsule Take 5 mg by mouth two (2) times daily as needed. Indications: HEADACHE    carvedilol (COREG) 6.25 mg tablet Take 6.25 mg by mouth two (2) times a day.  alendronate (FOSAMAX) 70 mg tablet Take  by mouth every Sunday.  venlafaxine-SR (EFFEXOR XR) 150 mg capsule Take  by mouth daily.  baclofen (LIORESAL) 10 mg tablet Take 20 mg by mouth four (4) times daily.  pantoprazole (PROTONIX) 40 mg tablet Take 40 mg by mouth daily. Twice a day    predniSONE (DELTASONE) 10 mg tablet Take  by mouth daily (with breakfast).  oxyCODONE-acetaminophen (PERCOCET)  mg per tablet Take 1 Tab by mouth. Take 1/3 tab as needed    HYDROmorphone (DILAUDID) 2 mg tablet Take one tablet PO Q 6 HRS as needed for **POST OPERATIVE BREAKTHROUGH PAIN**    HYDROmorphone (DILAUDID) 2 mg tablet TAKE ONE TABLET BY MOUTH Q 6 HRS PRN for POST OPERATIVE BREAKTHROUGH PAIN  **DO NOT FILL BEFORE 1/10/17**  Indications: PAIN    promethazine (PHENERGAN) 25 mg tablet Take 1 Tab by mouth every six (6) hours as needed for Nausea.  oxyCODONE-acetaminophen (PERCOCET) 5-325 mg per tablet Take 1 Tab by mouth two (2) times a day. Max Daily Amount: 2 Tabs.  oxyCODONE-acetaminophen (PERCOCET) 5-325 mg per tablet Take 1 Tab by mouth three (3) times daily. Max Daily Amount: 3 Tabs.  oxyCODONE-acetaminophen (PERCOCET)  mg per tablet 1 tab po q 4 hrs prn pain    promethazine (PHENERGAN) 25 mg tablet Take 1 Tab by mouth every six (6) hours as needed for Nausea.  biotin 10,000 mcg cap Take  by mouth.  VITAMIN B COMPLEX PO Take  by mouth.  lamoTRIgine (LAMICTAL) 100 mg tablet Take  by mouth two (2) times a day.  ondansetron hcl (ZOFRAN, AS HYDROCHLORIDE,) 8 mg tablet Take 8 mg by mouth every eight (8) hours as needed for Nausea.  methylphenidate (RITALIN) 10 mg tablet Take  by mouth three (3) times daily. No current facility-administered medications for this visit. Allergies   Allergen Reactions    Aspirin Unknown (comments)     Severe stomach pain and bleeding    Lyrica [Pregabalin] Other (comments)     Slurred speech    Nsaids (Non-Steroidal Anti-Inflammatory Drug) Other (comments)     Hx  Of bleeding ulcers    Sulfa (Sulfonamide Antibiotics) Rash    Tetracycline Hives     Social History     Occupational History    Not on file   Tobacco Use    Smoking status: Former Smoker     Quit date: 2015     Years since quittin.5    Smokeless tobacco: Never Used   Substance and Sexual Activity    Alcohol use: No    Drug use: No     Comment: Prior history of drug use    Sexual activity: Not on file     Family History   Problem Relation Age of Onset    Arthritis-osteo Other     Stroke Mother     Hypertension Mother        THE  FOR Katelyn Murray MD 2/3/2021 .       DIAGNOSTIC IMAGING  LAB DATA      No results found for: HBA1C, HGBE8, EYA1LBTY, VAD1SBBK //   Lab Results   Component Value Date/Time    Glucose 64 (L) 12/15/2016 03:15 PM        No results found for: XQR8GQKM, QLS8RCLH      No results found for: VITD3, Jose A Swetha, XQVID, VD3RIA, [id number]      REVIEW OF SYSTEMS : 2/3/2021  ALL BELOW ARE Negative except : SEE HPI     CONSTITUTIONAL: No weight loss  PSYCHOLOGICAL : No Feelings of anxiety, depression, agitation  EYES: No blurred vision and no eye discharge. NO eye pain, double vision  ENT: No nasal discharge. No ear pain. CARDIOVASCULAR: No chest pain and no diaphoresis. RESPIRATORY: No cough, no hemoptysis. GI: No vomiting, no diarrhea   : No urinary frequency and no dysuria. MUSCULOSKELETAL: see HPI  SKIN: No rashes. NEURO:  No dizziness,weakness, headaches// No visual changes or confusion, or seizures,   ENDOCRINE: No polyphagia and no polydipsia. HEMATOLOGY: No bleeding tendencies. DIAGNOSTIC IMAGING        2/3/2021     X-rays 3 views, of her left foot weightbearing: Show severe degenerative changes, across her subtalar region, where she had remote calcaneus fracture and she also has flattening of Boehler's angle and has some anterior ankle impingement bony impingement. There is postop changes, from her left great toe first MTP arthrodesis, denies osteopenia throughout her midfoot, and some root is residual hammering of the lesser digits and this lateral radiographic x-ray. Postop changes, from multiple metatarsal head resection arthroplasties that were performed in the past from her rheumatologic forefoot reconstructive surgery    The AP left foot and oblique left foot, shows a solid left great toe MTP arthrodesis with a dorsal plate in place. There are some valgus alignment, of the left great toe IP region. Please see above section of this report. I have personally reviewed the results of the above study. The interpretation of this study is my professional opinion.       Justin Norris MD  2/3/2021  1:26 PM

## 2021-02-17 ENCOUNTER — OFFICE VISIT (OUTPATIENT)
Dept: ORTHOPEDIC SURGERY | Age: 56
End: 2021-02-17
Payer: MEDICARE

## 2021-02-17 VITALS
HEIGHT: 64 IN | SYSTOLIC BLOOD PRESSURE: 123 MMHG | RESPIRATION RATE: 16 BRPM | HEART RATE: 88 BPM | DIASTOLIC BLOOD PRESSURE: 82 MMHG | WEIGHT: 108.8 LBS | TEMPERATURE: 97 F | BODY MASS INDEX: 18.57 KG/M2

## 2021-02-17 DIAGNOSIS — M19.172 POST-TRAUMATIC OSTEOARTHRITIS OF LEFT FOOT: Primary | ICD-10-CM

## 2021-02-17 DIAGNOSIS — M79.672 LEFT FOOT PAIN: ICD-10-CM

## 2021-02-17 DIAGNOSIS — M79.675 PAIN AND SWELLING OF TOE OF LEFT FOOT: ICD-10-CM

## 2021-02-17 DIAGNOSIS — M79.89 PAIN AND SWELLING OF TOE OF LEFT FOOT: ICD-10-CM

## 2021-02-17 PROCEDURE — G8427 DOCREV CUR MEDS BY ELIG CLIN: HCPCS | Performed by: ORTHOPAEDIC SURGERY

## 2021-02-17 PROCEDURE — G8420 CALC BMI NORM PARAMETERS: HCPCS | Performed by: ORTHOPAEDIC SURGERY

## 2021-02-17 PROCEDURE — G8432 DEP SCR NOT DOC, RNG: HCPCS | Performed by: ORTHOPAEDIC SURGERY

## 2021-02-17 PROCEDURE — 3017F COLORECTAL CA SCREEN DOC REV: CPT | Performed by: ORTHOPAEDIC SURGERY

## 2021-02-17 PROCEDURE — 99213 OFFICE O/P EST LOW 20 MIN: CPT | Performed by: ORTHOPAEDIC SURGERY

## 2021-02-17 NOTE — PROGRESS NOTES
AMBULATORY PROGRESS NOTE      Patient: Thien Mack             MRN: [id number]     SSN: [id number] Body mass index is 18.97 kg/m². YOB: 1965     AGE: 63 y.o. EX: female    PCP: Wade Gomez MD       IMPRESSION //  DIAGNOSIS AND TREATMENT PLAN      DIAGNOSES  1. Post-traumatic osteoarthritis of left foot    2. Left foot pain    3. Pain and swelling of toe of left foot        Orders Placed This Encounter    Generic Supply Order     Short CAM Boot          Thien Mack has point tenderness to the lateral part of her hindfoot near the extensor digitorum brevis muscle in his left foot. Because of her disabling discomfort, recommendation, is to put her in a cam walker boot    PLAN:    1. Placed in a cam walker boot she can weight-bear try cam walker boot  2. I needed recheck for blood work, from [facility]. [facility], but was not able to get the blood work results that we had recommended that she get from her last office visit. The blood work that was recommended is listed as below. From her prior office visit. CBC WITH AUTOMATED DIFF        Standing Status:   Future       Standing Expiration Date:   8/3/2021    C REACTIVE PROTEIN, QT       Standing Status:   Future       Standing Expiration Date:   8/3/2021    SED RATE (ESR)       Standing Status:   Future       Standing Expiration Date:   9/6/0212    METABOLIC PANEL, COMPREHENSIVE       Standing Status:   Future       Standing Expiration Date:   8/3/2021         2-week RTO.       HPI //  OBJECTIVE EXAMINATION        Thien Mack IS A 63 y.o. female who presents to my outpatient office for evaluation of:    Since we saw her last she is having pain discomfort lateral part of her foot, for which she points to the distal remote well surgical healed scar she had a lateral hindfoot she has discomfort and pain along the extensor digitorum brevis muscle region near the sinus Tarsi region. Rebecca Palencia (1965) is a 63 y.o. female, established patient, here for evaluation of the following chief complaint(s):    Chief Complaint   Patient presents with    Toe Pain     left        Chief complaint, is lateral hindfoot pain, denies any fevers or chills night sweats. She reports that area of swelling, just, popped up over the last day or so along the lateral part of her hindfoot. Visit Vitals  /82 (BP 1 Location: Right arm, BP Patient Position: Sitting)   Pulse 88   Temp 97 °F (36.1 °C) (Temporal)   Resp 16   Ht 5' 3.5\" (1.613 m)   Wt 108 lb 12.8 oz (49.4 kg)   BMI 18.97 kg/m²       Appearance: Alert, well appearing and pleasant patient who is in no distress, oriented to person, place/time, and who follows commands. This patient is accompanied in the examination room by her  self. There is no signs of: no dementia  Psychiatric: Affect and mood are appropriate. Patient arrives to office via: without assistive device:    H EENT (2): Head normocephalic & atraumatic. Both pupils are round, non icteric sclera     Eye: EOM are intact // Neck: ROM WNL  //  Hearings Intact   Respiratory: Breathing is unlabored without accessory chest muscle use    ANKLE/FOOT left     Psychiatry: Alert, oriented x 3 (name,place,time of day); speech normal in context and clarity, memory intact grossly, no involuntary movements - tremors, no dementia  Gait: normal  Tenderness: mild plantar great toe   Cutaneous: firm fullness to plantar part of great toe, distal phalanx; 4ht toe is angulating under the 3rd toe medial rotational deformity of 4th great toe at the PIP  Joint Motion: WNL  Joint / Tendon Stability: No Ankle or Subtalar instability or joint laxity. No peroneal sublux ability or dislocation  Alignment: neutral Hindfoot, none Metatarsus Adductus Metatarsus.   Neuro Motor/Sensory: NL/NL,  Vascular: NL foot/ankle pulses,   Lymphatics: No extremity lymphedema, No calf swelling, no tenderness to calf muscles. CHART REVIEW     Rebecca Palencia has been experiencing pain and discomfort confirmed as outlined in the pain assessment outlined below. Pain Assessment  2/17/2021   Location of Pain Toe   Location Modifiers Left   Severity of Pain 7   Quality of Pain Sharp; Throbbing;Aching   Quality of Pain Comment -   Duration of Pain Persistent   Frequency of Pain Constant   Date Pain First Started -   Aggravating Factors Walking   Limiting Behavior Yes   Relieving Factors (No Data)   Relieving Factors Comment oxycodone   Result of Injury No        Rebecca Palencia  has a past medical history of Anemia, Chronic pain, Contact lens/glasses fitting, Frequent UTI (2006), GERD (gastroesophageal reflux disease), H/O eating disorder, Hip fracture, left ([facility] [address] (2000), Hypertension, Leg length discrepancy, Needs pre-anesthesia assessment, Post herpetic neuralgia, PUD (peptic ulcer disease), Rheumatoid arthritis ([facility] [address] Rheumatoid arthritis [facility]), Shingles (April 2014), and Tooth abscess (10/6/15).      Patients is employed at:         Past Medical History:   Diagnosis Date    Anemia     Chronic pain     Contact lens/glasses fitting     Frequent UTI 2006    GERD (gastroesophageal reflux disease)     H/O eating disorder     remote    Hip fracture, left ([address] 2000    Hypertension     Leg length discrepancy     Needs pre-anesthesia assessment     Patient reports a history of high tolerance to pain medicine and prior hisoty of drug use    Post herpetic neuralgia     PUD (peptic ulcer disease)     Rheumatoid arthritis ([facility] [address]     Rheumatoid arthritis ([facility] [address]     Shingles April 2014    Tooth abscess 10/6/15    Completed Amoxicillin      Past Surgical History:   Procedure Laterality Date    HX GYN  2012    fibroids    HX ORTHOPAEDIC Left 2012    partial hip replacement    HX ORTHOPAEDIC  2009    attempt bunionectomy    HX ORTHOPAEDIC  10/2015    Dr. Peggy Meza: First Metatarsophylangeal Fusion,etc     Current Outpatient Medications   Medication Sig    levETIRAcetam (Keppra) 1,000 mg tablet Take 1,500 mg by mouth daily.  gabapentin (NEURONTIN) 600 mg tablet Take 600 mg by mouth three (3) times daily.  methotrexate (RHEUMATREX) 2.5 mg tablet Take 15 mg by mouth.  folic acid (FOLVITE) 1 mg tablet Take  by mouth daily.  oxyCODONE IR (ROXICODONE) 10 mg tab immediate release tablet Take 5 mg by mouth.  hydrOXYchloroQUINE (PLAQUENIL) 200 mg tablet Take 200 mg by mouth two (2) times a day.  azaTHIOprine (Imuran) 50 mg tablet Take 50 mg by mouth three (3) times daily.  prochlorperazine (COMPAZINE) 10 mg tablet Take 5 mg by mouth every six (6) hours as needed.  polyethylene glycol (MIRALAX) 17 gram/dose powder Take 17 g by mouth daily.  dextroamphetamine-amphetamine (ADDERALL) 10 mg tablet Take 10 mg by mouth three (3) times daily.  oxyCODONE-acetaminophen (PERCOCET) 5-325 mg per tablet Take 1 Tab by mouth three (3) times daily. Max Daily Amount: 3 Tabs.  medroxyPROGESTERone (PROVERA) 5 mg tablet Take 5 mg by mouth daily.  imipramine (TOFRANIL) 25 mg tablet Take 300 mg by mouth nightly.  polyethylene glycol (MIRALAX) 17 gram/dose powder Take 17 g by mouth daily.  CALCIUM CARBONATE/VITAMIN D3 (CALCIUM+D PO) Take  by mouth.  multivitamin (ONE A DAY) tablet Take 1 Tab by mouth daily.  estradiol (ESTRACE) 0.5 mg tablet Take  by mouth.  dronabinol (MARINOL) 5 mg capsule Take 5 mg by mouth two (2) times daily as needed. Indications: HEADACHE    carvedilol (COREG) 6.25 mg tablet Take 6.25 mg by mouth two (2) times a day.  alendronate (FOSAMAX) 70 mg tablet Take  by mouth every Sunday.  venlafaxine-SR (EFFEXOR XR) 150 mg capsule Take  by mouth daily.  baclofen (LIORESAL) 10 mg tablet Take 20 mg by mouth four (4) times daily.  pantoprazole (PROTONIX) 40 mg tablet Take 40 mg by mouth daily.  Twice a day    predniSONE (DELTASONE) 10 mg tablet Take  by mouth daily (with breakfast).  HYDROmorphone (DILAUDID) 2 mg tablet Take one tablet PO Q 6 HRS as needed for **POST OPERATIVE BREAKTHROUGH PAIN**    HYDROmorphone (DILAUDID) 2 mg tablet TAKE ONE TABLET BY MOUTH Q 6 HRS PRN for POST OPERATIVE BREAKTHROUGH PAIN  **DO NOT FILL BEFORE 1/10/17**  Indications: PAIN    promethazine (PHENERGAN) 25 mg tablet Take 1 Tab by mouth every six (6) hours as needed for Nausea.  oxyCODONE-acetaminophen (PERCOCET) 5-325 mg per tablet Take 1 Tab by mouth two (2) times a day. Max Daily Amount: 2 Tabs.  oxyCODONE-acetaminophen (PERCOCET)  mg per tablet 1 tab po q 4 hrs prn pain    0.9% sodium chloride solution     promethazine (PHENERGAN) 25 mg tablet Take 1 Tab by mouth every six (6) hours as needed for Nausea.  biotin 10,000 mcg cap Take  by mouth.  VITAMIN B COMPLEX PO Take  by mouth.  lamoTRIgine (LAMICTAL) 100 mg tablet Take  by mouth two (2) times a day.  ondansetron hcl (ZOFRAN, AS HYDROCHLORIDE,) 8 mg tablet Take 8 mg by mouth every eight (8) hours as needed for Nausea.  methylphenidate (RITALIN) 10 mg tablet Take  by mouth three (3) times daily.  oxyCODONE-acetaminophen (PERCOCET)  mg per tablet Take 1 Tab by mouth. Take 1/3 tab as needed     No current facility-administered medications for this visit.       Allergies   Allergen Reactions    Aspirin Unknown (comments)     Severe stomach pain and bleeding    Lyrica [Pregabalin] Other (comments)     Slurred speech    Nsaids (Non-Steroidal Anti-Inflammatory Drug) Other (comments)     Hx  Of bleeding ulcers    Sulfa (Sulfonamide Antibiotics) Rash    Tetracycline Hives     Social History     Occupational History    Not on file   Tobacco Use    Smoking status: Former Smoker     Quit date: 2015     Years since quittin.5    Smokeless tobacco: Never Used   Substance and Sexual Activity    Alcohol use: No    Drug use: No     Comment: Prior history of drug use    Sexual activity: Not on file     Family History   Problem Relation Age of Onset    Arthritis-osteo Other     Stroke Mother     Hypertension Mother         DIAGNOSTIC LAB DATA      No results found for: HBA1C, HGBE8, YLI9SXTH, [id number] //   Lab Results   Component Value Date/Time    Glucose 64 (L) 12/15/2016 03:15 PM        No results found for: VBM6NXDV, KVF5RGUT      No results found for: VITD3, XQVID2, XQVID3, XQVID, VD3RIA, [id number]      REVIEW OF SYSTEMS : 2/17/2021  ALL BELOW ARE Negative except : SEE HPI     All other systems reviewed and are negative. 12 point review of systems otherwise negative unless noted in HPI. DIAGNOSTIC IMAGING           I have reviewed the results of the above study. The interpretation of this study is my professional opinion.       Seamus Clark MD  2/17/2021  4:47 PM

## 2021-02-24 ENCOUNTER — APPOINTMENT (OUTPATIENT)
Dept: PHYSICAL THERAPY | Age: 56
End: 2021-02-24

## 2021-03-01 ENCOUNTER — HOSPITAL ENCOUNTER (OUTPATIENT)
Dept: PHYSICAL THERAPY | Age: 56
Discharge: HOME | End: 2021-03-01
Payer: COMMERCIAL

## 2021-03-01 PROCEDURE — 97140 MANUAL THERAPY 1/> REGIONS: CPT

## 2021-03-01 PROCEDURE — 97110 THERAPEUTIC EXERCISES: CPT

## 2021-03-01 PROCEDURE — 97165 OT EVAL LOW COMPLEX 30 MIN: CPT

## 2021-03-01 NOTE — PROGRESS NOTES
OT DAILY TREATMENT NOTE     Patient Name: Anjelica Cantu  Date:3/1/2021  : 1965  [x]  Patient  Verified  Payor: BLUE CROSS / Plan: [place] / Product Type: PPO /    In time:239  Out time:315  Total Treatment Time (min): 36  Visit #: 1 of 4    Medicare/BCBS Only   Total Timed Codes (min): 23 1:1 Treatment Time:  36     Treatment Area: Sprain of interphalangeal joint of left little finger, sequela [S63.637S]    SUBJECTIVE  Pain Level (0-10 scale): 10  Any medication changes, allergies to medications, adverse drug reactions, diagnosis change, or new procedure performed?: [x] No    [] Yes (see summary sheet for update)  Subjective functional status/changes:   [] No changes reported  I think it is mostly the RA    OBJECTIVE         13 min []Eval                  []Re-Eval       13 min Therapeutic Exercise:  [] See flow sheet :   Rationale: increase ROM to improve the patient’s ability to flex 5th digit  Reviewed and completed blocking exercises for left 5th digit  Left 5th digit adduction x 10       10 min Manual Therapy:  Edema control   The manual therapy interventions were performed at a separate and distinct time from the therapeutic activities interventions.  Rationale: decrease pain, increase ROM and decrease edema  to grasp  Reviewed retrograde massage and use of edema sleeves      With   [x] TE   [] TA   [] neuro   [x] other: Patient Education: [x] Review HEP    [] Progressed/Changed HEP based on: 5th digit blocking HEP  [] positioning   [] body mechanics   [] transfers   [] heat/ice application   [] Splint wear/care   [] Sensory re-education   [] scar management      [x] other: edema management           Other Objective/Functional Measures:   Subjective: pt is a right hand dominant, 55 y.o.y/o, female who sustained his/her injury in fall 2021.  She went to urgent care and had no fracture.  When it continued to have pain, she went to orthopedist who recommended therapy to  improve ROM, reduce edema and pain. 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        Objective:  Edema: Circumference    Right  Left   PIP   5.1cm  5.4cm  Topical Changes: Color: red   Sensation:No changes noted      Hand ROM R/L      Small     MP    100  95     PIP    100  90     DIP    70  65                         Finger Opposition:WNL  Hyperext right 1.5 Left 1.0  Palpation: Tender radial side and 4th dorsal interossei     ADLs  Feeding:        []MaxA   []ModA   []Jaimie   [] CGA   []SBA   []Judy   [x]Independent  UE Dressing:       []MaxA   []ModA   []Jaimie   [] CGA   []SBA   []Judy   [x]Independent  LE Dressing:       []MaxA   []ModA   []Jaimie   [] CGA   []SBA   []Judy   [x]Independent  Grooming:       []MaxA   []ModA   []Jaimie   [] CGA   []SBA   []Judy   [x]Independent  Toileting:       []MaxA   []ModA   []Jaimie   [] CGA   []SBA   []Judy   [x]Independent  Bathing:       []MaxA   []ModA   [x]Jaimie   [] CGA   []SBA   []Judy   []Independent  Light Meal Prep:    []MaxA   []ModA   [x]Jaimie   [] CGA   []SBA   []Judy   []Independent  Household/Other: []MaxA   []ModA   [x]Jaimie   [] CGA   []SBA   []Judy   []Independent  Adaptive Equip:     []MaxA   []ModA   []Jaimie   [] CGA   []SBA   []Judy   []Independent  Driving:       []MaxA   []ModA   [x]Jaimie   [] CGA   []SBA   []Judy   []Independent  Money Mgmt:        []MaxA   []ModA   []Jaimie   [] CGA   []SBA   []Judy   [x]Independent       Pain Level (0-10 scale) post treatment: 4/10    ASSESSMENT/Changes in Function:    [x]  See Plan of Care  []  See progress note/recertification  []  See Discharge Summary          PLAN  []  Upgrade activities as tolerated     []  Continue plan of care  []  Update interventions per flow sheet       []  Discharge due to:_  []  Other:_      Nandini Epp, OTR/L 3/1/2021  2:18 PM    Future Appointments   Date Time Provider Lacie Barroni   3/3/2021  1:30 PM Bouchra Smith MD [facility] AMB   4/9/2021 10:15 AM DO LEN BonillaMD BS AMB

## 2021-03-01 NOTE — PROGRESS NOTES
[facility]  [address] [facility]  [phone] fax    Plan of Care/Statement of Necessity for Occupational Therapy Services    Patient name: Derrick Dsouza Start of Care: 3/1/2021   Referral source: Jeramy Tomlinson DO : 1965    Medical Diagnosis: Sprain of interphalangeal joint of left little finger, sequela [S63.637S]  Payor: BLUE CROSS / Plan: [address] [place] [address] / Product Type: PPO /  Onset Date:    Treatment Diagnosis: Pain left 5th digit   Prior Hospitalization: see medical history Provider#: [id number]   Medications: Verified on Patient summary List    Comorbidities: Raynauds, RA, post herpetic neuralgia, jerome hip, HTN, medial thalamotomy   Prior Level of Function: On disability, 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 Assessment/ key information: pt is a right hand dominant, 63 y.o.y/o, female who sustained his/her injury in fall 2021. She went to urgent care and had no fracture. When it continued to have pain, she went to orthopedist who recommended therapy to improve ROM, reduce edema and pain. She report pain of 4/10. Her AROM of 5th digit is reduced at all joints by 5-10 degrees each. She has difficulty adducting 5th digit fully but is able to do it. She has difficulty hyperextending MP joint to level of right hand. Her PIP has increased girth of .3cm. She reports not using 5th idigt in grasp activities due to pain. Her FOTO is 62/100 reflecting slight impairment in function. She will benefit from brief skilled occupational therapy to improve digit ROM for home care tasks.       Evaluation Complexity: History LOW Complexity : Brief history review  Examination LOW Complexity : 1-3 performance deficits relating to physical, cognitive , or psychosocial skils that result in activity limitations and / or participation restrictions  Clinical Decision Making LOW Complexity : No comorbidities that affect functional and no verbal or physical assistance needed to complete eval tasks   Overall Complexity Rating: LOW     Problem List: Pain effecting function, Decreased range of motion, Decreased strength, Edema effecting function, Decreased ADL/functional abilities  and Decreased activity tolerance     Treatment Plan may include any combination of the following: Therapeutic exercise, Therapeutic activities, Physical agent/modality, Manual therapy, Patient education and ADLs/IADLs    Patient / Family readiness to learn indicated by: asking questions, trying to perform skills and interest    Persons(s) to be included in education:   patient (P)    Barriers to Learning/Limitations: None    Patient Goal (s): better movement    Patient Self Reported Health Status: good    Rehabilitation Potential: good  Short Term Goals: To be accomplished in 2 treatments:  1. Patient to be independent in edema management strategies. Patient will be independent in home exercise program for blocking exercises and digit adduction. Long Term Goals: To be accomplished in 4 treatments:   1. Patient will increase left 5th digit SOMMERS by at least 15 degrees for better grasp  2. Patient will report pain in left 5th digit reduced to 2/10 during routine tasks in home care. 3.  Patient will report improved use of hand in self and home care as shown by increase in FOTO of at lest 8 points.     4.  Patient will have reduced PIP girth of left 5th digit by at least .2cm  Frequency / Duration: Patient to be seen 2 times per week for 4 treatments:    Patient/ Caregiver education and instruction: Diagnosis, prognosis, self care, activity modification, exercises and other edema control  [x]  Plan of care has been reviewed with ALVAREZ    Certification Period: 3/1/2021-3/30/2021    Nain Lakhani, OTR/L 3/1/2021 3:39 PM      ________________________________________________________________________    I certify that the above Therapy Services are being furnished while the patient is under my care. I agree with the treatment plan and certify that this therapy is necessary.     [address] [place] [address] Signature:____________Date:_________TIME:________     Melissa Copper, DO  ** Signature, Date and Time must be completed for valid certification **    Please sign and return to [facility]  [facility] [employer]   [address] [place] [address]  [phone] fax

## 2021-03-03 ENCOUNTER — OFFICE VISIT (OUTPATIENT)
Dept: ORTHOPEDIC SURGERY | Age: 56
End: 2021-03-03
Payer: MEDICARE

## 2021-03-03 VITALS
DIASTOLIC BLOOD PRESSURE: 82 MMHG | HEART RATE: 63 BPM | OXYGEN SATURATION: 100 % | TEMPERATURE: 96.9 F | RESPIRATION RATE: 16 BRPM | SYSTOLIC BLOOD PRESSURE: 105 MMHG | HEIGHT: 64 IN | BODY MASS INDEX: 18.97 KG/M2

## 2021-03-03 DIAGNOSIS — M06.9 RHEUMATOID ARTHRITIS INVOLVING MULTIPLE SITES, UNSPECIFIED WHETHER RHEUMATOID FACTOR PRESENT (HCC): ICD-10-CM

## 2021-03-03 DIAGNOSIS — M20.5X2 ACQUIRED MALLET TOE OF LEFT FOOT: ICD-10-CM

## 2021-03-03 DIAGNOSIS — M19.172 POST-TRAUMATIC OSTEOARTHRITIS OF LEFT FOOT: Primary | ICD-10-CM

## 2021-03-03 DIAGNOSIS — M20.42 HAMMER TOE OF LEFT FOOT: ICD-10-CM

## 2021-03-03 DIAGNOSIS — M19.172 POST-TRAUMATIC OSTEOARTHRITIS OF LEFT FOOT: ICD-10-CM

## 2021-03-03 PROCEDURE — G8420 CALC BMI NORM PARAMETERS: HCPCS | Performed by: ORTHOPAEDIC SURGERY

## 2021-03-03 PROCEDURE — G8427 DOCREV CUR MEDS BY ELIG CLIN: HCPCS | Performed by: ORTHOPAEDIC SURGERY

## 2021-03-03 PROCEDURE — G8432 DEP SCR NOT DOC, RNG: HCPCS | Performed by: ORTHOPAEDIC SURGERY

## 2021-03-03 PROCEDURE — 99214 OFFICE O/P EST MOD 30 MIN: CPT | Performed by: ORTHOPAEDIC SURGERY

## 2021-03-03 PROCEDURE — 3017F COLORECTAL CA SCREEN DOC REV: CPT | Performed by: ORTHOPAEDIC SURGERY

## 2021-03-03 NOTE — PROGRESS NOTES
AMBULATORY PROGRESS NOTE      Patient: Juan Barnard             MRN: [id number]     SSN: [id number] Body mass index is 18.97 kg/m². YOB: 1965     AGE: 63 y.o. EX: female    PCP: Wendy Joshi MD       IMPRESSION //  DIAGNOSIS AND TREATMENT PLAN        Juan Barnard has she has some doing much better, having less pain to the lateral hindfoot. She is in a cam walker boot. I went over her blood work, from [address] [place] [address] her ESR, CRP, and CBC was normal her differential showed slightly high polymorphonucleocytes 84%, but otherwise the remainder of her differential was normal.    It is to be recalled that she has rheumatoid arthritis. Her rheumatologist is Dr. Madhavi Oviedo of [facility]: Patient does take methotrexate, Imuran, hydroxychloroquine, and prednisone. Her PCP is Dr. Denice Quintanilla and her pain management doctor is Chapo Lobo MD.    She is can require surgical invention on her left great toe  I anticipate performing a left great toe IP joint arthrodesis , a left #4 toe revision IP hammertoe PIP fusion transfixing this with a screw  As well as an open great toe flexor tenotomy. The goal is to improve the alignment of her left great toe, and improved alignment of the left #4 toe. Her left great toe, has swelling at the IP region, more than likely because of rheumatologic inflammatory arthritis, and also has varus alignment at t PIP region of the fourth toe which the fourth goes under the third toe. She does take prednisone, so suspect she is needed prednisone dose, before her surgery. She was have surgery sometime this month March 2021, will do her best to get this scheduled for her. DIAGNOSES  1. Post-traumatic osteoarthritis of left foot    2. Hammer toe of left foot    3. Acquired mallet toe of left foot    4.  Rheumatoid arthritis involving multiple sites, unspecified whether rheumatoid factor present ([facility] [address]        Orders Placed This Encounter    SCHEDULE SURGERY     Scheduling Instructions:      PATIENT NAME: Hakeem Barefoot             PROCEDURE LOCATION: [facility]      TIME LINE FOR PROCEDURE :Elective      LENGTH OF PROCEDURE: 1.5 HOURS      PROCEDURE DATE: TBD      ADMIT: Outpatient      (M19.172) Post-traumatic osteoarthritis of left foot  (primary encounter diagnosis)            (M20.42) Hammer toe of left foot            (M20.5X2) Acquired mallet toe of left foot                  PROCEDURE: Left great toe IP fusion 30248      Left great toe IP open flexor tenotomy  15312      Left #4 revision IP hammertoe PIP fusion  47084                   ANESTHESIA: general anesthesia            EQUIPMENT:       C-Arm, SYNTHES             HARDWARE: Synthes headless cannulated compression screws: 3.0 mm size screws, 2.5 Jorgensen, 2.0 meter screws Synthes rep yes        ALLOGRAFT: None needed        C ARM: Yes large C arm      SURGERY REP:  ,        PHONE NUMBER:             LABS PREOPyes      CBC with diff, CMP, PT/INR, URINE ANALYSIS, CXR PA/LATERAL, EKG 12 Lead             CLEARANCES:       Need to get medical clearance from Dr. Zaheer Rosenbaum let him know that we will do surgery on her, Dr. Justin Naqvi is her PCP,       Dr. Gisell Menchaca DO rheumatologist: We did spot speak with her, this patient takes methotrexate, Imuran, hydroxychloroquine, prednisone. Pain management: Dr. Elle Murphy            Is Patient on Blood Thinners?: No:               No orders of the defined types were placed in this enc    XR CHEST PA LAT     Standing Status:   Future     Standing Expiration Date:   5/3/2021     Scheduling Instructions:      Please recheck to confirm if:      1. Patient has Allergry to IV contrast dye      2.  Patient is pregnant     Order Specific Question:   Reason for Exam     Answer:   Preop Risk stratificatiion    CBC WITH AUTOMATED DIFF     Standing Status:   Future     Standing Expiration Date:   3/6/8121    METABOLIC PANEL, COMPREHENSIVE Standing Status:   Future     Standing Expiration Date:   5/3/2021    PROTHROMBIN TIME + INR     Standing Status:   Future     Standing Expiration Date:   5/3/2021    VITAMIN D, 25 HYDROXY     Standing Status:   Future     Standing Expiration Date:   3/3/2022    NOVEL CORONAVIRUS (COVID-19)     Standing Status:   Future     Standing Expiration Date:   3/3/2022     Scheduling Instructions:      1) Due to current limited availability of the COVID-19 PCR test, tests will be prioritized and may not be completed.              2) Order only if the test result will change clinical management or necessary for a return to mission-critical employment decision.              3) Print and instruct patient to adhere to CDC home isolation program. (Link Above)              4) Set up or refer patient for a monitoring program.              5) Have patient sign up for and leverage MyChart (if not previously done). Order Specific Question:   Is this test for diagnosis or screening? Answer:   Screening     Order Specific Question:   Symptomatic for COVID-19 as defined by CDC? Answer:   No     Order Specific Question:   Hospitalized for COVID-19? Answer:   No     Order Specific Question:   Admitted to ICU for COVID-19? Answer:   No     Order Specific Question:   Employed in healthcare setting? Answer:   No     Order Specific Question:   Resident in a congregate (group) care setting? Answer:   No     Order Specific Question:   Pregnant? Answer:   No     Order Specific Question:   Previously tested for COVID-19? Answer:   No    EKG, 12 LEAD, SUBSEQUENT     preop     Standing Status:   Future     Standing Expiration Date:   9/3/2021     Order Specific Question:   Reason for Exam:     Answer:   risk stratify        PLAN:    1. See above      RTO-       Kain Horner  expresses understanding of the diagnosis, treatment plan, and all of their proposed questions were answered to their satisfaction. Patient education has been provided re the diagnoses. Hakeem Vincent may have a reminder for a \"due or due soon\" health maintenance. I have asked that she contact her primary care provider for follow-up on this health maintenance. HPI //  OBJECTIVE EXAMINATION        Hakeem Vincent IS A 63 y.o. female who is a/an  established patient , presenting to my outpatient office for evaluation of  the following chief complaint(s):     Chief Complaint   Patient presents with    Foot Pain     left foot pain       Since I saw her last she is doing better as relates her left hindfoot pain she is been in a cam walker boot her left hindfoot pain is improved. She still however having pain discomfort left great toe I PA region, and to the distal portion left great toe distal tip, she is a flexion alignment and valgus alignment of this left great toe IP region and she has some discomfort to the left fourth toe is a left fourth toe is going under the third toe and has varus alignment of the left #4 toe at the PIP region. She had a history of forefoot reconstructive surgery, left great MTP arthrodesis in the past as well as metatarsal head resections many years ago and hammertoe PIP resection arthroplasties of care fixation many years ago on his left side. Visit Vitals  /82 (BP 1 Location: Right arm, BP Patient Position: Sitting, BP Cuff Size: Adult)   Pulse 63   Temp 96.9 °F (36.1 °C) (Temporal)   Resp 16   Ht 5' 3.5\" (1.613 m)   SpO2 100%   BMI 18.97 kg/m²       Appearance: Alert, well appearing and pleasant patient who is in no distress, oriented to person, place/time, and who follows commands. This patient is accompanied in the examination room by her  self. There is signs of: no dementia  Psychiatric: Affect/mood are appropriate. Speech normal in context and clarity, memory intact grossly, no involuntary movements - tremors.   Patient arrives to office via: with assistive device: CAM BOOT  H EENT (2): Head normocephalic & atraumatic. Both pupils are round     Eye: EOM are intact // Neck: ROM WNL  //  Hearings Intact   Respiratory: Breathing non labored     ANKLE/FOOT left    Gait:  MILD LIMP AT THIS TIME  Tenderness: Moderate focal exquisite tenderness to the distal tip of the left great toe distal phalanx  Cutaneous: Swelling is present to the left great toe IP region and valgus alignment present left great toe IP region, angular malalignment left fourth toe at the PIP region  Joint Motion: Poor hindfoot motion, having had a history of calcaneus fracture in the past.  No motion of left great toe MTP, from her great toe MTP arthrodesis in the remote past WNL  Joint / Tendon Stability: No Ankle or Subtalar instability or joint laxity. No peroneal sublux ability or dislocation  Alignment: neutral Hindfoot,    Neuro Motor/Sensory: NL/NL  Vascular: NL foot/ankle pulses,   Lymphatics: No extremity lymphedema, No calf swelling, no tenderness to calf muscles. CHART REVIEW     Maria Teresa Underwood has been experiencing pain and discomfort confirmed as outlined in the pain assessment outlined below.  was reviewed by Rigoberto Salgado MD on 3/3/2021. Pain Assessment  3/3/2021   Location of Pain Foot   Location Modifiers Left   Severity of Pain 5   Quality of Pain Sharp; Throbbing;Aching   Quality of Pain Comment swelling   Duration of Pain A few hours   Frequency of Pain Intermittent   Date Pain First Started -   Aggravating Factors Walking;Standing   Limiting Behavior -   Relieving Factors NSAID;Rest   Relieving Factors Comment medications   Result of Injury Gwen Underwood  has a past medical history of Anemia, Chronic pain, Contact lens/glasses fitting, Frequent UTI (2006), GERD (gastroesophageal reflux disease), H/O eating disorder, Hip fracture, left ([facility] [address] (2000), Hypertension, Leg length discrepancy, Needs pre-anesthesia assessment, Post herpetic neuralgia, PUD (peptic ulcer disease), Rheumatoid arthritis ([facility] [address] Rheumatoid arthritis [facility]), Shingles (April 2014), and Tooth abscess (10/6/15). Patients is employed at:         Past Medical History:   Diagnosis Date    Anemia     Chronic pain     Contact lens/glasses fitting     Frequent UTI 2006    GERD (gastroesophageal reflux disease)     H/O eating disorder     remote    Hip fracture, left ([facility] [address] 2000    Hypertension     Leg length discrepancy     Needs pre-anesthesia assessment     Patient reports a history of high tolerance to pain medicine and prior hisoty of drug use    Post herpetic neuralgia     PUD (peptic ulcer disease)     Rheumatoid arthritis ([facility] [address]     [facility]     Shingles April 2014    Tooth abscess 10/6/15    Completed Amoxicillin      Past Surgical History:   Procedure Laterality Date    HX GYN  2012    fibroids    HX ORTHOPAEDIC Left 2012    partial hip replacement    HX ORTHOPAEDIC  2009    attempt bunionectomy    HX ORTHOPAEDIC  10/2015    Dr. Joanna Jordan: First Metatarsophylangeal Fusion,etc     Current Outpatient Medications   Medication Sig    levETIRAcetam (Keppra) 1,000 mg tablet Take 1,500 mg by mouth daily.  gabapentin (NEURONTIN) 600 mg tablet Take 600 mg by mouth three (3) times daily.  methotrexate (RHEUMATREX) 2.5 mg tablet Take 15 mg by mouth.  folic acid (FOLVITE) 1 mg tablet Take  by mouth daily.  oxyCODONE IR (ROXICODONE) 10 mg tab immediate release tablet Take 5 mg by mouth.  hydrOXYchloroQUINE (PLAQUENIL) 200 mg tablet Take 200 mg by mouth two (2) times a day.  azaTHIOprine (Imuran) 50 mg tablet Take 50 mg by mouth three (3) times daily.     HYDROmorphone (DILAUDID) 2 mg tablet Take one tablet PO Q 6 HRS as needed for **POST OPERATIVE BREAKTHROUGH PAIN**    HYDROmorphone (DILAUDID) 2 mg tablet TAKE ONE TABLET BY MOUTH Q 6 HRS PRN for POST OPERATIVE BREAKTHROUGH PAIN  **DO NOT FILL BEFORE 1/10/17**  Indications: PAIN    prochlorperazine (COMPAZINE) 10 mg tablet Take 5 mg by mouth every six (6) hours as needed.  polyethylene glycol (MIRALAX) 17 gram/dose powder Take 17 g by mouth daily.  promethazine (PHENERGAN) 25 mg tablet Take 1 Tab by mouth every six (6) hours as needed for Nausea.  dextroamphetamine-amphetamine (ADDERALL) 10 mg tablet Take 10 mg by mouth three (3) times daily.  oxyCODONE-acetaminophen (PERCOCET) 5-325 mg per tablet Take 1 Tab by mouth two (2) times a day. Max Daily Amount: 2 Tabs.  oxyCODONE-acetaminophen (PERCOCET) 5-325 mg per tablet Take 1 Tab by mouth three (3) times daily. Max Daily Amount: 3 Tabs.  oxyCODONE-acetaminophen (PERCOCET)  mg per tablet 1 tab po q 4 hrs prn pain    0.9% sodium chloride solution     medroxyPROGESTERone (PROVERA) 5 mg tablet Take 5 mg by mouth daily.  imipramine (TOFRANIL) 25 mg tablet Take 300 mg by mouth nightly.  promethazine (PHENERGAN) 25 mg tablet Take 1 Tab by mouth every six (6) hours as needed for Nausea.  polyethylene glycol (MIRALAX) 17 gram/dose powder Take 17 g by mouth daily.  CALCIUM CARBONATE/VITAMIN D3 (CALCIUM+D PO) Take  by mouth.  biotin 10,000 mcg cap Take  by mouth.  VITAMIN B COMPLEX PO Take  by mouth.  multivitamin (ONE A DAY) tablet Take 1 Tab by mouth daily.  estradiol (ESTRACE) 0.5 mg tablet Take  by mouth.  lamoTRIgine (LAMICTAL) 100 mg tablet Take  by mouth two (2) times a day.  dronabinol (MARINOL) 5 mg capsule Take 5 mg by mouth two (2) times daily as needed. Indications: HEADACHE    carvedilol (COREG) 6.25 mg tablet Take 6.25 mg by mouth two (2) times a day.  ondansetron hcl (ZOFRAN, AS HYDROCHLORIDE,) 8 mg tablet Take 8 mg by mouth every eight (8) hours as needed for Nausea.  alendronate (FOSAMAX) 70 mg tablet Take  by mouth every Sunday.  methylphenidate (RITALIN) 10 mg tablet Take  by mouth three (3) times daily.  venlafaxine-SR (EFFEXOR XR) 150 mg capsule Take  by mouth daily.     baclofen (LIORESAL) 10 mg tablet Take 20 mg by mouth four (4) times daily.  pantoprazole (PROTONIX) 40 mg tablet Take 40 mg by mouth daily. Twice a day    predniSONE (DELTASONE) 10 mg tablet Take  by mouth daily (with breakfast).  oxyCODONE-acetaminophen (PERCOCET)  mg per tablet Take 1 Tab by mouth. Take 1/3 tab as needed     No current facility-administered medications for this visit. Allergies   Allergen Reactions    Aspirin Unknown (comments)     Severe stomach pain and bleeding    Lyrica [Pregabalin] Other (comments)     Slurred speech    Nsaids (Non-Steroidal Anti-Inflammatory Drug) Other (comments)     Hx  Of bleeding ulcers    Sulfa (Sulfonamide Antibiotics) Rash    Tetracycline Hives     Social History     Occupational History    Not on file   Tobacco Use    Smoking status: Former Smoker     Quit date: 2015     Years since quittin.6    Smokeless tobacco: Never Used   Substance and Sexual Activity    Alcohol use: No    Drug use: No     Comment: Prior history of drug use    Sexual activity: Not on file     Family History   Problem Relation Age of Onset    Arthritis-osteo Other     Stroke Mother     Hypertension Mother         DIAGNOSTIC LAB DATA      No results found for: HBA1C, HGBE8, FHZ2YFCP, LAV7QGOU //   Lab Results   Component Value Date/Time    Glucose 64 (L) 12/15/2016 03:15 PM        No results found for: YNA7WHFJ, QIQ4TBLS      No results found for: VITD3, XQVID2, XQVID3, XQVID, VD3RIA, [id number]      REVIEW OF SYSTEMS : 3/3/2021  ALL BELOW ARE Negative except : SEE HPI     All other systems reviewed and are negative. 12 point review of systems otherwise negative unless noted in HPI. DIAGNOSTIC IMAGING      X-rays 3 views left foot, AP oblique, from February 3, 2021: Interpretation of these x-rays that were done on the previous office visit    Solid left great toe MTP fusion is noted, with plate /interfragmentary screw fixation.   There is valgus alignment of the left great toe IP region with associated soft tissue swelling there is postop changes from her multiple metatarsal head resection and resection arthroplasties, there is some varus alignment of this left fourth toe as the left fourth toe at the PIP region, is going under the third toe. There is severe degenerative changes, seen at this subtalar joint, having had a remote history of calcaneus fracture, posttraumatic arthritis, and history of nonunion of left subtalar joint. There is evidence of anterior ankle impingement syndrome. I have reviewed the results of the above study. The interpretation of this study is my professional opinion. An electronic signature was used to authenticate this note. Justin Norris MD  3/3/2021  2:18 PM      Disclaimer: Sections of this note are dictated using utilizing voice recognition software, which may have resulted in some phonetic based errors in grammar and contents. Even though attempts were made to correct all the mistakes, some may have been missed, and remained in the body of the document. If questions arise, please contact our department.      Justin Norris MD  3/3/2021  2:18 PM

## 2021-03-10 ENCOUNTER — HOSPITAL ENCOUNTER (OUTPATIENT)
Dept: PHYSICAL THERAPY | Age: 56
Discharge: HOME | End: 2021-03-10
Payer: COMMERCIAL

## 2021-03-10 PROCEDURE — 97140 MANUAL THERAPY 1/> REGIONS: CPT

## 2021-03-10 PROCEDURE — 97110 THERAPEUTIC EXERCISES: CPT

## 2021-03-10 NOTE — PROGRESS NOTES
OT DAILY TREATMENT NOTE 10-18    Patient Name: Carolyn Bui  Date:3/10/2021  : 1965  [x]  Patient  Verified  Payor: Sarah Woodward / Plan: [address] [place] [address] / Product Type: PPO /    In time:435  Out time:515  Total Treatment Time (min): 40  Visit #: 2 of 4    Medicare/BCBS Only   Total Timed Codes (min):  40 1:1 Treatment Time:  40     Treatment Area: Sprain of interphalangeal joint of left little finger, sequela [S63.637S]    SUBJECTIVE  Pain Level (0-10 scale): 2/10  Any medication changes, allergies to medications, adverse drug reactions, diagnosis change, or new procedure performed?: [x] No    [] Yes (see summary sheet for update)  Subjective functional status/changes:   [] No changes reported  Patient reports barely wearing digit edema sleeve    OBJECTIVE    30 min Therapeutic Exercise:  [] See flow sheet :   Rationale: increase ROM and increase strength to improve the patients ability to grasp    SF Blocking  Digit Abduction/Adduction with puff balls  Pen SF Hook Fist      10 min Manual Therapy:  Edema Massage   The manual therapy interventions were performed at a separate and distinct time from the therapeutic activities interventions.   Rationale: decrease pain, increase ROM and increase tissue extensibility to improve functional use of Left hand         With   [] TE   [] TA   [] neuro   [] other: Patient Education: [x] Review HEP    [] Progressed/Changed HEP based on:   [] positioning   [] body mechanics   [] transfers   [] heat/ice application   [] Splint wear/care   [] Sensory re-education   [] scar management      [] other:            Other Objective/Functional Measures:     Edema- in CM   3/10   Pre Massage 5.8   Post  Massage  5.5              Pain Level (0-10 scale) post treatment: 310    ASSESSMENT/Changes in Function: cueing to hold HEP, decrease in edema post edema massage    Patient will continue to benefit from skilled OT services to modify and progress therapeutic interventions, address ROM deficits, address strength deficits and instruct in home and community integration to attain remaining goals. []  See Plan of Care  []  See progress note/recertification  []  See Discharge Summary         Progress towards goals / Updated goals:    Short Term Goals: To be accomplished in 2 treatments:  1. Patient to be independent in edema management strategies. 3/10: Initiated on this date    2. Patient will be independent in home exercise program for blocking exercises and digit adduction. 3/10: Cueing to hold     Long Term Goals: To be accomplished in 4 treatments:       1. Patient will increase left 5th digit SOMMERS by at least 15 degrees for better grasp  2. Patient will report pain in left 5th digit reduced to 2/10 during routine tasks in home care. 3.  Patient will report improved use of hand in self and home care as shown by increase in FOTO of at lest 8 points.     4.  Patient will have reduced PIP girth of left 5th digit by at least .2cm  3/10: Decrease with massage     PLAN  []  Upgrade activities as tolerated     [x]  Continue plan of care  []  Update interventions per flow sheet       []  Discharge due to:_  []  Other:_      CHRIS Sandoval 3/10/2021  12:24 PM    Future Appointments   Date Time Provider Lacie Webster   3/10/2021  4:30 PM Rome Hopkins [id number] [facility]   3/12/2021  3:45 PM Rome Hopkins [id number] [facility]   3/15/2021  3:45 PM PATRICIO Mckeon [id number] [facility]   4/9/2021 10:15 AM Karl Phipps,  [facility] BS AMB

## 2021-03-15 ENCOUNTER — HOSPITAL ENCOUNTER (OUTPATIENT)
Dept: PHYSICAL THERAPY | Age: 56
Discharge: HOME | End: 2021-03-15
Payer: COMMERCIAL

## 2021-03-15 PROCEDURE — 97035 APP MDLTY 1+ULTRASOUND EA 15: CPT

## 2021-03-15 PROCEDURE — 97110 THERAPEUTIC EXERCISES: CPT

## 2021-03-15 NOTE — PROGRESS NOTES
OT DAILY TREATMENT NOTE     Patient Name: Zena Rodgers  Date:3/15/2021  : 1965  [x]  Patient  Verified  Payor: Joanna Dsouza / Plan: [address] [place] [address] / Product Type: PPO /    In time:350  Out time:435  Total Treatment Time (min): 45  Visit #: 4 of 4    Medicare/BCBS Only   Total Timed Codes (min):  45 1:1 Treatment Time:  45     Treatment Area: Sprain of interphalangeal joint of left little finger, sequela [S63.637S]    SUBJECTIVE  Pain Level (0-10 scale): 2/10  Any medication changes, allergies to medications, adverse drug reactions, diagnosis change, or new procedure performed?: [x] No    [] Yes (see summary sheet for update)  Subjective functional status/changes:   [] No changes reported  Feeling better    OBJECTIVE    Modality rationale: decrease inflammation and decrease pain to improve the patients ability to grasp   Min Type Additional Details    [] Estim:  []Unatt       []IFC  []Premod                        []Other:  []w/ice   []w/heat  Position:  Location:    [] Estim: []Att    []TENS instruct  []NMES                    []Other:  []w/US   []w/ice   []w/heat  Position:  Location:    []  Traction: [] Cervical       []Lumbar                       [] Prone          []Supine                       []Intermittent   []Continuous Lbs:  [] before manual  [] after manual   8 [x]  Ultrasound: []Continuous   [x] Pulsed                  50%         []1MHz   [x]3MHz W/cm2:1.0  Location:radial side PIP    []  Iontophoresis with dexamethasone         Location: [] Take home patch   [] In clinic    []  Ice     []  heat  []  Ice massage  []  Laser   []  Paraffin Position:  Location:    []  Laser with stim  []  Other:  Position:  Location:    []  Vasopneumatic Device Pressure:       [] lo [] med [] hi   Temperature: [] lo [] med [] hi       [x] Skin assessment post-treatment:  [x]intact []redness- no adverse reaction    []redness  adverse reaction:       37 min Therapeutic Exercise:  [] See flow sheet :   Rationale: increase ROM to improve the patients ability to grasp  Left hand  Blocking exercise 5th PIP/DIP  Trial hook fist with bead with popping of joint-discontinued  Digit adduction x 10 with soft putty  Radial digit walk left hand  Recheck 5th digit ROM, girth       With   [] TE   [] TA   [] neuro   [] other: Patient Education: [x] Review HEP    [] Progressed/Changed HEP based on: final HEP  [] positioning   [] body mechanics   [] transfers   [] heat/ice application   [] Splint wear/care   [] Sensory re-education   [] scar management      [] other:            Other Objective/Functional Measures:   Left 5th   MP  105  PIP  90  DIP  70  Hyperext 1.5 (1.0)  PIP girth 5.1 (was 5.4)       Pain Level (0-10 scale) post treatment: 2/10    ASSESSMENT/Changes in Function: Improved ROM, girth, function     []  See Plan of Care  []  See progress note/recertification  [x]  See Discharge Summary         Progress towards goals / Updated goals:  1.  Patient to be independent in edema management strategies. 3/10: Initiated on this date   3/15/21 Met    2. Patient will be independent in home exercise program for blocking exercises and digit adduction. 3/10: Cueing to hold  3/15/21 Met     Long Term Goals: To be accomplished in 4 treatments:       1.  Patient will increase left 5th digit SOMMERS by at least 15 degrees for better grasp  3/15/21 Met  2.  Patient will report pain in left 5th digit reduced to 2/10 during routine tasks in home care.   3/15/21 Met  3.  Patient will report improved use of hand in self and home care as shown by increase in FOTO of at lest 8 points.    4.  Patient will have reduced PIP girth of left 5th digit by at least .2cm  3/10: Decrease with massage   3/15/21 Met    PLAN  []  Upgrade activities as tolerated     []  Continue plan of care  []  Update interventions per flow sheet       []  Discharge due to:_  []  Other:_      PATRICIO Scott 3/15/2021  9:00 AM    Future Appointments   Date Time Provider Lacie Webster   3/15/2021  3:45 PM Doneta Child, OTR/L [facility]   3/23/2021  9:00 AM ANAI Berkowitz BS AMB   3/31/2021  9:30 AM ANAI Berkowitz BS AMB   4/9/2021 10:15 AM DO PHUONG Zazueta Cera AMB

## 2021-03-18 DIAGNOSIS — M20.42 HAMMER TOE OF LEFT FOOT: ICD-10-CM

## 2021-03-18 DIAGNOSIS — M20.5X2 ACQUIRED MALLET TOE OF LEFT FOOT: ICD-10-CM

## 2021-03-18 DIAGNOSIS — M19.172 POST-TRAUMATIC OSTEOARTHRITIS OF LEFT FOOT: ICD-10-CM

## 2021-03-18 NOTE — PROGRESS NOTES
[facility] Ekaterina Jacob  [address] [facility]  [phone] fax    Occupational Therapy Discharge Summary  Patient name: Melinda Dotson Start of Care: 3/1/21   Referral source: Alina Torrez DO : 1965   Medical/Treatment Diagnosis: Sprain of interphalangeal joint of left little finger, sequela [E64.807S]  Payor: [employer] / Plan: [address] [place] [address] / Product Type: PPO /  Onset Date:21     Prior Hospitalization: see medical history Provider#: [id number]   Medications: Verified on Patient Summary List    Comorbidities:  Raynauds, RA, post herpetic neuralgia, jerome hip, HTN, medial thalamotomy  Prior Level of Function:On disability, 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 S/he has HEP. Left 5th   Current measures listed below with prior in ( )  MP                   105 (95)  PIP                  90 (90)  DIP                  70 (65)  Hyperext         1.5       (1.0)  PIP girth         5.1       (was 5.4)    .   Patient to be independent in edema management strategies. Eval status; Reviewed use of sleeve  Discharge status:Met     2. Patient will be independent in home exercise program for blocking exercises and digit adduction. Eval status; Reviewed  Discharge status; met     Long Term Goals: To be accomplished in 4 treatments:       1.  Patient will increase left 5th digit SOMMERS by at least 15 degrees for better grasp  Eval status; See chart above  Discharge status; met    2.  Patient will report pain in left 5th digit reduced to 2/10 during routine tasks in home care.   Eval status; Pain 4/10  Discharge status; Met    3.  Patient will report improved use of hand in self and home care as shown by increase in FOTO of at lest 8 points.    Eval status; FOTO 62  Discharge status; 67, progressing    4.  Patient will have reduced PIP girth of left 5th digit by at least .2cm  Eval status; See chart  Discharge status;  Met    ASSESSMENT/Changes in Function: Improved ROM, decreased edema, function per FOTO    ASSESSMENT/RECOMMENDATIONS:  [x]Discontinue therapy: [x]Patient has reached or is progressing toward set goals      []Patient is non-compliant or has abdicated      []Due to lack of appreciable progress towards set goals    BELÉN Hodges/L 3/18/2021 4:26 PM

## 2021-03-22 ENCOUNTER — HOSPITAL ENCOUNTER (OUTPATIENT)
Dept: GENERAL RADIOLOGY | Age: 56
Discharge: HOME | End: 2021-03-22
Payer: COMMERCIAL

## 2021-03-22 ENCOUNTER — HOSPITAL ENCOUNTER (OUTPATIENT)
Dept: PREADMISSION TESTING | Age: 56
Discharge: HOME | End: 2021-03-22
Payer: COMMERCIAL

## 2021-03-22 DIAGNOSIS — M20.42 HAMMER TOE OF LEFT FOOT: ICD-10-CM

## 2021-03-22 DIAGNOSIS — M19.172 POST-TRAUMATIC OSTEOARTHRITIS OF LEFT FOOT: ICD-10-CM

## 2021-03-22 DIAGNOSIS — M20.5X2 ACQUIRED MALLET TOE OF LEFT FOOT: ICD-10-CM

## 2021-03-22 LAB
25(OH)D3 SERPL-MCNC: 43.8 NG/ML (ref 30–100)
ALBUMIN SERPL-MCNC: 3.8 G/DL (ref 3.4–5)
ALBUMIN/GLOB SERPL: 1.3 {RATIO} (ref 0.8–1.7)
ALP SERPL-CCNC: 65 U/L (ref 45–117)
ALT SERPL-CCNC: 29 U/L (ref 13–56)
ANION GAP SERPL CALC-SCNC: 4 MMOL/L (ref 3–18)
AST SERPL-CCNC: 22 U/L (ref 10–38)
ATRIAL RATE: 66 BPM
BASOPHILS # BLD: 0 K/UL (ref 0–0.1)
BASOPHILS NFR BLD: 0 % (ref 0–2)
BILIRUB SERPL-MCNC: 0.3 MG/DL (ref 0.2–1)
BUN SERPL-MCNC: 17 MG/DL (ref 7–18)
BUN/CREAT SERPL: 37 (ref 12–20)
CALCIUM SERPL-MCNC: 8.7 MG/DL (ref 8.5–10.1)
CALCULATED P AXIS, ECG09: 84 DEGREES
CALCULATED R AXIS, ECG10: 61 DEGREES
CALCULATED T AXIS, ECG11: 63 DEGREES
CHLORIDE SERPL-SCNC: 99 MMOL/L (ref 100–111)
CO2 SERPL-SCNC: 33 MMOL/L (ref 21–32)
CREAT SERPL-MCNC: 0.46 MG/DL (ref 0.6–1.3)
DIAGNOSIS, 93000: NORMAL
DIFFERENTIAL METHOD BLD: ABNORMAL
EOSINOPHIL # BLD: 0 K/UL (ref 0–0.4)
EOSINOPHIL NFR BLD: 1 % (ref 0–5)
ERYTHROCYTE [DISTWIDTH] IN BLOOD BY AUTOMATED COUNT: 12.8 % (ref 11.6–14.5)
GLOBULIN SER CALC-MCNC: 2.9 G/DL (ref 2–4)
GLUCOSE SERPL-MCNC: 81 MG/DL (ref 74–99)
HCT VFR BLD AUTO: 38.7 % (ref 35–45)
HGB BLD-MCNC: 13.4 G/DL (ref 12–16)
INR PPP: 0.9 (ref 0.8–1.2)
LYMPHOCYTES # BLD: 1 K/UL (ref 0.9–3.6)
LYMPHOCYTES NFR BLD: 31 % (ref 21–52)
MCH RBC QN AUTO: 33.8 PG (ref 24–34)
MCHC RBC AUTO-ENTMCNC: 34.6 G/DL (ref 31–37)
MCV RBC AUTO: 97.7 FL (ref 74–97)
MONOCYTES # BLD: 0.9 K/UL (ref 0.05–1.2)
MONOCYTES NFR BLD: 27 % (ref 3–10)
NEUTS SEG # BLD: 1.3 K/UL (ref 1.8–8)
NEUTS SEG NFR BLD: 41 % (ref 40–73)
P-R INTERVAL, ECG05: 168 MS
PLATELET # BLD AUTO: 238 K/UL (ref 135–420)
PMV BLD AUTO: 10.7 FL (ref 9.2–11.8)
POTASSIUM SERPL-SCNC: 4.1 MMOL/L (ref 3.5–5.5)
PROT SERPL-MCNC: 6.7 G/DL (ref 6.4–8.2)
PROTHROMBIN TIME: 12.4 SEC (ref 11.5–15.2)
Q-T INTERVAL, ECG07: 402 MS
QRS DURATION, ECG06: 76 MS
QTC CALCULATION (BEZET), ECG08: 421 MS
RBC # BLD AUTO: 3.96 M/UL (ref 4.2–5.3)
SODIUM SERPL-SCNC: 136 MMOL/L (ref 136–145)
VENTRICULAR RATE, ECG03: 66 BPM
WBC # BLD AUTO: 3.2 K/UL (ref 4.6–13.2)

## 2021-03-22 PROCEDURE — 82306 VITAMIN D 25 HYDROXY: CPT

## 2021-03-22 PROCEDURE — 85025 COMPLETE CBC W/AUTO DIFF WBC: CPT

## 2021-03-22 PROCEDURE — 71046 X-RAY EXAM CHEST 2 VIEWS: CPT

## 2021-03-22 PROCEDURE — U0005 INFEC AGEN DETEC AMPLI PROBE: HCPCS

## 2021-03-22 PROCEDURE — 36415 COLL VENOUS BLD VENIPUNCTURE: CPT

## 2021-03-22 PROCEDURE — 93005 ELECTROCARDIOGRAM TRACING: CPT

## 2021-03-22 PROCEDURE — 85610 PROTHROMBIN TIME: CPT

## 2021-03-22 PROCEDURE — 80053 COMPREHEN METABOLIC PANEL: CPT

## 2021-03-23 DIAGNOSIS — M79.89 PAIN AND SWELLING OF TOE OF LEFT FOOT: ICD-10-CM

## 2021-03-23 DIAGNOSIS — M79.675 PAIN AND SWELLING OF TOE OF LEFT FOOT: ICD-10-CM

## 2021-03-23 LAB — SARS-COV-2, COV2NT: NOT DETECTED

## 2021-03-23 NOTE — H&P
FOOT AND ANKLE HISTORY AND PHYSICAL        Patient: Shaggy Miller                      MRN: [id number]         SSN: [id number]  YOB: 1965                        AGE: 61 y.o. SEX: female      Patient scheduled for:  Left great toe interphalangeal joint fusion; left great toe interphalangeal joint open flexor tenotomy; left fourth toe revision interphalangeal joint hammertoe proximal interphalangeal fusion  Date of surgery: 3/26/21   [facility]   Surgeon: Palak Sandra MD  ANESTHESIA TYPE:  General, Popliteal block          ORDERS PLACED DURING H&P:      Orders Placed This Encounter    Generic Supply Order     Post op shoe    oxyCODONE IR (ROXICODONE) 10 mg tab immediate release tablet     Sig: Take 1-1.5 Tabs by mouth every eight (8) hours as needed for Pain (After surgery, not before) for up to 7 days. Max Daily Amount: 45 mg. Dispense:  32 Tab     Refill:  0    rivaroxaban (Xarelto) 10 mg tablet     Sig: TAKE ONE TABLET BY MOUTH PER DAY FOLLOWING SURGERY  Indications: treatment to prevent recurrence of a clot in a deep vein     Dispense:  30 Tab     Refill:  0    polyethylene glycol (Miralax) 17 gram packet     Sig: Take 1 Packet by mouth daily. Dispense:  10 Packet     Refill:  1    cephALEXin (KEFLEX) 500 mg capsule     Sig: Take 1 Cap by mouth four (4) times daily for 7 days. Dispense:  28 Cap     Refill:  0      Post Operative Prescriptions have been escribed during the H&P to the [facility] on [address]:     The patient was seen in the office today for a preoperative history and physical for an upcoming above listed surgery.   The patient is a pleasant 61 y.o. female who has a history of  pain and discomfort left great toe IP region, and to the distal portion left great toe distal tip, she is a flexion and valgus alignment of the left great toe IP region and she has some discomfort to the left fourth toe is a left fourth toe is going under the third toe and has varus alignment of the left #4 toe at the PIP region. She had a history of forefoot reconstructive surgery, left great MTP arthrodesis in the past as well as metatarsal head resections many years ago and hammertoe PIP resection arthroplasties of care fixation many years ago on his left side. Due to the current findings, affected activity of daily living and continued pain and discomfort, surgical intervention is indicated. The alternatives, risks, and complications, including but not limited to infection, blood loss, need for blood transfusion, neurovascular damage, tayo-incisional numbness, subcutaneous hematoma, bone fracture, anesthetic complications, DVT, PE, death, RSD, postoperative stiffness and pain, possible surgical scar, delayed healing and nonhealing, reflexive sympathetic dystrophy, damage to blood vessels and nerves, need for more surgery, MI, and stroke, failure of hardware, gait disturbances  have been discussed. The patient understands and wishes to proceed with surgery. PAST MEDICAL HISTORY:     Past Medical History:   Diagnosis Date    Anemia     Chronic pain     Contact lens/glasses fitting     Frequent UTI 2006    GERD (gastroesophageal reflux disease)     H/O eating disorder     remote    Hip fracture, left ([facility] 2000    Hypertension     Leg length discrepancy     Needs pre-anesthesia assessment     Patient reports a history of high tolerance to pain medicine and prior hisoty of drug use    Post herpetic neuralgia     PUD (peptic ulcer disease)     Rheumatoid arthritis ([facility]     Rheumatoid arthritis ([facility]     Shingles April 2014    Tooth abscess 10/6/15    Completed Amoxicillin        CURRENT MEDICATIONS:     Current Outpatient Medications   Medication Sig Dispense Refill    levETIRAcetam (Keppra) 1,000 mg tablet Take 1,500 mg by mouth daily.  Indications: Neuralgia      gabapentin (NEURONTIN) 600 mg tablet Take 600 mg by mouth three (3) times daily.  folic acid (FOLVITE) 1 mg tablet Take  by mouth daily.  hydrOXYchloroQUINE (PLAQUENIL) 200 mg tablet Take 200 mg by mouth two (2) times a day.  azaTHIOprine (Imuran) 50 mg tablet Take 50 mg by mouth three (3) times daily.  dextroamphetamine-amphetamine (ADDERALL) 10 mg tablet Take 10 mg by mouth two (2) times a day.  medroxyPROGESTERone (PROVERA) 5 mg tablet Take 5 mg by mouth daily.  imipramine (TOFRANIL) 25 mg tablet Take 300 mg by mouth nightly.  polyethylene glycol (MIRALAX) 17 gram/dose powder Take 17 g by mouth daily. 255 g 1    multivitamin (ONE A DAY) tablet Take 1 Tab by mouth daily.  estradiol (ESTRACE) 0.5 mg tablet Take  by mouth.  dronabinol (MARINOL) 5 mg capsule Take 5 mg by mouth two (2) times daily as needed. Indications: HEADACHE      carvedilol (COREG) 6.25 mg tablet Take 6.25 mg by mouth two (2) times a day.  alendronate (FOSAMAX) 70 mg tablet Take  by mouth every Sunday.  venlafaxine-SR (EFFEXOR XR) 150 mg capsule Take  by mouth daily.  baclofen (LIORESAL) 10 mg tablet Take 20 mg by mouth four (4) times daily.  pantoprazole (PROTONIX) 40 mg tablet Take 40 mg by mouth daily. Twice a day      predniSONE (DELTASONE) 10 mg tablet Take  by mouth daily (with breakfast).  oxyCODONE-acetaminophen (PERCOCET)  mg per tablet Take 1 Tab by mouth. Take 1/3 tab as needed      methotrexate (RHEUMATREX) 2.5 mg tablet Take 15 mg by mouth every Sunday.  oxyCODONE IR (ROXICODONE) 10 mg tab immediate release tablet Take 5 mg by mouth.       HYDROmorphone (DILAUDID) 2 mg tablet Take one tablet PO Q 6 HRS as needed for **POST OPERATIVE BREAKTHROUGH PAIN** 50 Tab 0    HYDROmorphone (DILAUDID) 2 mg tablet TAKE ONE TABLET BY MOUTH Q 6 HRS PRN for POST OPERATIVE BREAKTHROUGH PAIN  **DO NOT FILL BEFORE 1/10/17**  Indications: PAIN 50 Tab 0    prochlorperazine (COMPAZINE) 10 mg tablet Take 5 mg by mouth every six (6) hours as needed.  polyethylene glycol (MIRALAX) 17 gram/dose powder Take 17 g by mouth daily. 255 g 1    promethazine (PHENERGAN) 25 mg tablet Take 1 Tab by mouth every six (6) hours as needed for Nausea. 30 Tab 0    oxyCODONE-acetaminophen (PERCOCET) 5-325 mg per tablet Take 1 Tab by mouth two (2) times a day. Max Daily Amount: 2 Tabs. 40 Tab 0    oxyCODONE-acetaminophen (PERCOCET) 5-325 mg per tablet Take 1 Tab by mouth three (3) times daily. Max Daily Amount: 3 Tabs. 60 Tab 0    oxyCODONE-acetaminophen (PERCOCET)  mg per tablet 1 tab po q 4 hrs prn pain 40 Tab 0    0.9% sodium chloride solution       promethazine (PHENERGAN) 25 mg tablet Take 1 Tab by mouth every six (6) hours as needed for Nausea. 30 Tab 0    CALCIUM CARBONATE/VITAMIN D3 (CALCIUM+D PO) Take  by mouth.  biotin 10,000 mcg cap Take  by mouth.  VITAMIN B COMPLEX PO Take  by mouth.  lamoTRIgine (LAMICTAL) 100 mg tablet Take  by mouth two (2) times a day.  ondansetron hcl (ZOFRAN, AS HYDROCHLORIDE,) 8 mg tablet Take 8 mg by mouth every eight (8) hours as needed for Nausea.  methylphenidate (RITALIN) 10 mg tablet Take  by mouth three (3) times daily.          ALLERGIES:     Allergies   Allergen Reactions    Aspirin Unknown (comments)     Severe stomach pain and bleeding    Lyrica [Pregabalin] Other (comments)     Slurred speech    Nsaids (Non-Steroidal Anti-Inflammatory Drug) Other (comments)     Hx  Of bleeding ulcers    Sulfa (Sulfonamide Antibiotics) Rash    Tetracycline Hives         SURGICAL HISTORY:     Past Surgical History:   Procedure Laterality Date    HX COLONOSCOPY      HX GYN  2012    fibroids    HX ORTHOPAEDIC Left 2012    partial hip replacement    HX ORTHOPAEDIC  2009    attempt bunionectomy    HX ORTHOPAEDIC  10/2015    Dr. Akanksha Ellis: [facility] HX ORTHOPAEDIC  2014    redo left hip replacement    NEUROLOGICAL PROCEDURE UNLISTED Bilateral 2016    Thalamotomy        SOCIAL HISTORY:     Social History     Socioeconomic History    Marital status:      Spouse name: Not on file    Number of children: Not on file    Years of education: Not on file    Highest education level: Not on file   Tobacco Use    Smoking status: Former Smoker     Quit date: 2015     Years since quittin.6    Smokeless tobacco: Never Used   Substance and Sexual Activity    Alcohol use: No    Drug use: No     Comment: Prior history of drug use- pt denies       FAMILY HISTORY:     Family History   Problem Relation Age of Onset    Arthritis-osteo Other     Stroke Mother     Hypertension Mother        REVIEW OF SYSTEMS:     Negative for fevers, chills, chest pain, shortness of breath, weight loss, recent illness     General: Negative for fever and chills. No unexpected change in weight. Denies fatigue. No change in appetite. Skin: Negative for rash or itching. HEENT: Negative for congestion, sore throat, neck pain and neck stiffness. No change in vision or hearing. Hasn't noted any enlarged lymph nodes in the neck. Cardiovascular:  Negative for chest pain and palpitations. Has not noted pedal edema. Respiratory: Negative for cough, colds, sinus, hemoptysis, shortness of breath and wheezing. Gastrointestinal: Negative for nausea and vomiting, rectal bleeding, coffee ground emesis, abdominal pain, diarrhea and constipation. Genitourinary: Negative for dysuria, frequency urgency, or burning on micturition. No flank pain, no foul smelling urine, no difficulty with initiating urination. Hematological: Negative for bleeding or easy bruising. Musculoskeletal: Negative for arthralgias, back pain or neck pain. Neurological: Negative for seizures or syncopal episodes, (+)headaches and dizziness. Endocrine: Denies excessive thirst.  No heat/cold intolerance.   Psychiatric: Negative for depression or insomnia. PHYSICAL EXAMINATION:     VITALS:         Visit Vitals  /89   Pulse 61   Temp 97.5 °F (36.4 °C)   Ht 5' 3\" (1.6 m)   Wt 107 lb (48.5 kg)   BMI 18.95 kg/m²         Pain Assessment  3/24/2021   Location of Pain Foot   Location Modifiers Left   Severity of Pain 5   Quality of Pain Throbbing   Quality of Pain Comment -   Duration of Pain Persistent   Frequency of Pain Constant   Date Pain First Started -   Aggravating Factors Bending   Limiting Behavior Yes   Relieving Factors Rest   Relieving Factors Comment -   Result of Injury No        GEN:  Well developed, well nourished 61 y.o. female in no acute distress. PSYCH: Alert an oriented to person, place and time. Mood, memory, affect, behavior and judgment normal. Speech normal in context and clarity. HEENT: Normocephalic and atraumatic. Eyes: Conjunctivae and EOM are normal.Pupils are equal, round, and reactive to light. External ear normal appearance, external nose normal appearing. Mouth/Throat: Oropharynx is clear and moist, able to handle oral secretions w/out difficulty, airway patent. NECK: Supple. Normal ROM, No lymphadenopathy. Trachea is midline. No bruising, swelling or deformity  RESP: Clear to auscultation bilaterally. No audible wheezing from mouth. No rales, rhonchi. Normal effort and breath sounds. No respiratory use of accessory muscles during breathing or distress  CHEST/ABDOMEN: Observation reveals: No audible wheezing from mouth. No accessory use of chest muscles during breathing. Non tender abdomen  CARDIO:  Normal rate, regular rhythm and normal heart sounds. No MGR. ABDOMEN: Non-tender, non-distended, normoactive bowel sounds in all four quadrants. There is no tenderness. There is no rebound and no guarding.    BACK: No CVA or spinal tenderness  BREAST:  Deferred  PELVIC:    Deferred   RECTAL:  Deferred   :           Deferred    EXTREMITIES: EXAMINATION OF:     ANKLE/FOOT left     Gait:  MILD LIMP AT THIS TIME  Tenderness: Moderate focal exquisite tenderness to the distal tip of the left great toe distal phalanx  Cutaneous: Swelling is present to the left great toe IP region and valgus alignment present left great toe IP region, angular malalignment left fourth toe at the PIP region  Joint Motion: Poor hindfoot motion, having had a history of calcaneus fracture in the past.  No motion of left great toe MTP, from her great toe MTP arthrodesis in the remote past WNL  Joint / Tendon Stability: No Ankle or Subtalar instability or joint laxity. No peroneal sublux ability or dislocation  Alignment: neutral Hindfoot,    Neuro Motor/Sensory: NL/NL  Vascular: NL foot/ankle pulses,   Lymphatics: No extremity lymphedema, No calf swelling, no tenderness to calf muscles. RADIOGRAPHS & DIAGNOSTIC STUDIES:     X-rays 3 views left foot, AP oblique, from February 3, 2021: Interpretation of these x-rays that were done on the previous office visit     Solid left great toe MTP fusion is noted, with plate /interfragmentary screw fixation. There is valgus alignment of the left great toe IP region with associated soft tissue swelling there is postop changes from her multiple metatarsal head resection and resection arthroplasties, there is some varus alignment of this left fourth toe as the left fourth toe at the PIP region, is going under the third toe. There is severe degenerative changes, seen at this subtalar joint, having had a remote history of calcaneus fracture, posttraumatic arthritis, and history of nonunion of left subtalar joint.   There is evidence of anterior ankle impingement syndrome.         LABS:     PENDING      Hospital Outpatient Visit on 03/22/2021   Component Date Value Ref Range Status    WBC 03/22/2021 3.2* 4.6 - 13.2 K/uL Final    RBC 03/22/2021 3.96* 4.20 - 5.30 M/uL Final    HGB 03/22/2021 13.4  12.0 - 16.0 g/dL Final    HCT 03/22/2021 38.7  35.0 - 45.0 % Final  MCV 03/22/2021 97.7* 74.0 - 97.0 FL Final    MCH 03/22/2021 33.8  24.0 - 34.0 PG Final    MCHC 03/22/2021 34.6  31.0 - 37.0 g/dL Final    RDW 03/22/2021 12.8  11.6 - 14.5 % Final    PLATELET 84/06/4466 412  135 - 420 K/uL Final    MPV 03/22/2021 10.7  9.2 - 11.8 FL Final    NEUTROPHILS 03/22/2021 41  40 - 73 % Final    LYMPHOCYTES 03/22/2021 31  21 - 52 % Final    MONOCYTES 03/22/2021 27* 3 - 10 % Final    EOSINOPHILS 03/22/2021 1  0 - 5 % Final    BASOPHILS 03/22/2021 0  0 - 2 % Final    ABS. NEUTROPHILS 03/22/2021 1.3* 1.8 - 8.0 K/UL Final    ABS. LYMPHOCYTES 03/22/2021 1.0  0.9 - 3.6 K/UL Final    ABS. MONOCYTES 03/22/2021 0.9  0.05 - 1.2 K/UL Final    ABS. EOSINOPHILS 03/22/2021 0.0  0.0 - 0.4 K/UL Final    ABS. BASOPHILS 03/22/2021 0.0  0.0 - 0.1 K/UL Final    DF 03/22/2021 AUTOMATED    Final    Sodium 03/22/2021 136  136 - 145 mmol/L Final    Potassium 03/22/2021 4.1  3.5 - 5.5 mmol/L Final    Chloride 03/22/2021 99* 100 - 111 mmol/L Final    CO2 03/22/2021 33* 21 - 32 mmol/L Final    Anion gap 03/22/2021 4  3.0 - 18 mmol/L Final    Glucose 03/22/2021 81  74 - 99 mg/dL Final    BUN 03/22/2021 17  7.0 - 18 MG/DL Final    Creatinine 03/22/2021 0.46* 0.6 - 1.3 MG/DL Final    BUN/Creatinine ratio 03/22/2021 37* 12 - 20   Final    GFR est AA 03/22/2021 >60  >60 ml/min/1.73m2 Final    GFR est non-AA 03/22/2021 >60  >60 ml/min/1.73m2 Final    Comment: (NOTE)  Estimated GFR is calculated using the Modification of Diet in Renal   Disease (MDRD) Study equation, reported for both  Americans   (GFRAA) and non- Americans (GFRNA), and normalized to 1.73m2   body surface area. The physician must decide which value applies to   the patient. The MDRD study equation should only be used in   individuals age 25 or older.  It has not been validated for the   following: pregnant women, patients with serious comorbid conditions,   or on certain medications, or persons with extremes of body size,   muscle mass, or nutritional status.  Calcium 03/22/2021 8.7  8.5 - 10.1 MG/DL Final    Bilirubin, total 03/22/2021 0.3  0.2 - 1.0 MG/DL Final    ALT (SGPT) 03/22/2021 29  13 - 56 U/L Final    AST (SGOT) 03/22/2021 22  10 - 38 U/L Final    Alk. phosphatase 03/22/2021 65  45 - 117 U/L Final    Protein, total 03/22/2021 6.7  6.4 - 8.2 g/dL Final    Albumin 03/22/2021 3.8  3.4 - 5.0 g/dL Final    Globulin 03/22/2021 2.9  2.0 - 4.0 g/dL Final    A-G Ratio 03/22/2021 1.3  0.8 - 1.7   Final    Prothrombin time 03/22/2021 12.4  11.5 - 15.2 sec Final    INR 03/22/2021 0.9  0.8 - 1.2   Final    Comment:            INR Therapeutic Ranges         (on stable oral anticoagulant):     INDICATION                INR  DVT/PE/Atrial Fib          2.0-3.0  MI/Mechanical Heart Valve  2.5-3.5      Vitamin D 25-Hydroxy 03/22/2021 43.8  30 - 100 ng/mL Final    Comment: (NOTE)  Deficiency               <20 ng/mL  Insufficiency          20-30 ng/mL  Sufficient             ng/mL  Possible toxicity       >100 ng/mL    The Method used is Siemens Advia Centaur currently standardized to a   Center of Disease Control and Prevention (CDC) certified reference   [address]. Samples containing fluorescein dye can produce falsely   elevated values when tested with the ADVIA Centaur Vitamin D Assay. It is recommended that results in the toxic range, >100 ng/mL, be   retested 72 hours post fluorescein exposure.  SARS-CoV-2 03/22/2021 Not Detected  Not Detected   Final    Comment: (NOTE)  This nucleic acid amplification test was developed and its  performance characteristics determined by [employer]. Nucleic acid amplification tests include RT-PCR and TMA. This test  has not been FDA cleared or approved. This test has been authorized  by FDA under an Emergency Use Authorization (EUA).  This test is only  authorized for the duration of time the declaration that  circumstances exist justifying the authorization of the emergency use  of in vitro diagnostic tests for detection of SARS-CoV-2 virus and/or  diagnosis of COVID-19 infection under section 564(b)(1) of the Act,  21 U. S.C. 430ZOP-8(B) (1), unless the authorization is terminated or  revoked sooner. When diagnostic testing is negative, the possibility of a false  negative result should be considered in the context of a patient's  recent exposures and the presence of clinical signs and symptoms  consistent with COVID-19. An individual without symptoms of COVID-  19 and who is not shedding SARS-CoV-2                            virus would expect to have a  negative (not detected) result in this assay. Performed At: [address] [id number]  Krishna Lopez MD PM:[id number]      Ventricular Rate 03/22/2021 66  BPM Final    Atrial Rate 03/22/2021 66  BPM Final    P-R Interval 03/22/2021 168  ms Final    QRS Duration 03/22/2021 76  ms Final    Q-T Interval 03/22/2021 402  ms Final    QTC Calculation (Bezet) 03/22/2021 421  ms Final    Calculated P Axis 03/22/2021 84  degrees Final    Calculated R Axis 03/22/2021 61  degrees Final    Calculated T Axis 03/22/2021 63  degrees Final    Diagnosis 03/22/2021    Final                    Value:Normal sinus rhythm  Possible Left atrial enlargement  Borderline ECG  When compared with ECG of 13-OCT-2015 12:04,  No significant change was found  Confirmed by Mayte Quezada MD, Kathe Castleman ([id number]) on 3/22/2021 2:48:17 PM          XR Results (most recent):  Results from Orders Only encounter on 03/03/21   XR CHEST PA LAT    Narrative INDICATION: Preop Risk stratification, left foot osteoarthritis. Hammertoe  deformity. TECHNIQUE: Two views of the chest    COMPARISON: 15 December 2016    FINDINGS: The lungs are adequately inflated. No focal consolidation, effusion or  pneumothorax. Heart size within normal limits. No acute osseous abnormality.  Dextroconvex spinal curvature centered at the  lower thoracic spine. Impression No acute cardiopulmonary findings. EKG RESULTS     Procedure Component Value Units Date/Time    EKG, 12 LEAD, INITIAL [[id number]] Collected: 03/22/21 1411    Order Status: Completed Updated: 03/22/21 1448     Ventricular Rate 66 BPM      Atrial Rate 66 BPM      P-R Interval 168 ms      QRS Duration 76 ms      Q-T Interval 402 ms      QTC Calculation (Bezet) 421 ms      Calculated P Axis 84 degrees      Calculated R Axis 61 degrees      Calculated T Axis 63 degrees      Diagnosis --     Normal sinus rhythm  Possible Left atrial enlargement  Borderline ECG  When compared with ECG of 13-OCT-2015 12:04,  No significant change was found  Confirmed by Zay Suarez MD, Rustam Chong ([id number]) on 3/22/2021 2:48:17 PM      EKG, 12 LEAD, SUBSEQUENT [[id number]]     Order Status: Sent               ASSESSMENT:         ICD-10-CM ICD-9-CM   1. Post-traumatic osteoarthritis of left foot  M19.172 715.27   2. Hammer toe of left foot  M20.42 735.4   3. Acquired mallet toe of left foot  M20.5X2 735.8   4. Rheumatoid arthritis involving multiple sites, unspecified whether rheumatoid factor present ([facility]  M06.9 714.0   5. Pre-operative examination  Z01.818 V72.84         PLAN:     Again, the alternatives, risks, and complications, as well as expected outcome were discussed. The patient understands and agrees to proceed with the above listed surgery. Patient has been instructed by Dr. Kiersten Humphreys to continue taking all RA medications. The patient was counseled at length about the risks of juan Covid-19 during their perioperative period and any recovery window from their procedure. The patient was made aware that juan Covid-19  may worsen their prognosis for recovering from their procedure and lend to a higher morbidity and/or mortality risk. All material risks, benefits, and reasonable alternatives including postponing the procedure were discussed.  The patient does wish to proceed with the procedure at this time. Carey Becerra [facility], MAURO, PA-C  3/24/2021  8:42 AM

## 2021-03-24 ENCOUNTER — OFFICE VISIT (OUTPATIENT)
Dept: ORTHOPEDIC SURGERY | Age: 56
End: 2021-03-24

## 2021-03-24 VITALS
WEIGHT: 107 LBS | HEART RATE: 61 BPM | HEIGHT: 63 IN | SYSTOLIC BLOOD PRESSURE: 129 MMHG | DIASTOLIC BLOOD PRESSURE: 89 MMHG | BODY MASS INDEX: 18.96 KG/M2 | TEMPERATURE: 97.5 F

## 2021-03-24 DIAGNOSIS — M19.172 POST-TRAUMATIC OSTEOARTHRITIS OF LEFT FOOT: Primary | ICD-10-CM

## 2021-03-24 DIAGNOSIS — Z01.818 PRE-OPERATIVE EXAMINATION: ICD-10-CM

## 2021-03-24 DIAGNOSIS — M20.5X2 ACQUIRED MALLET TOE OF LEFT FOOT: ICD-10-CM

## 2021-03-24 DIAGNOSIS — M20.42 HAMMER TOE OF LEFT FOOT: ICD-10-CM

## 2021-03-24 DIAGNOSIS — M06.9 RHEUMATOID ARTHRITIS INVOLVING MULTIPLE SITES, UNSPECIFIED WHETHER RHEUMATOID FACTOR PRESENT (HCC): ICD-10-CM

## 2021-03-24 RX ORDER — POLYETHYLENE GLYCOL 3350 17 G/17G
17 POWDER, FOR SOLUTION ORAL DAILY
Qty: 10 PACKET | Refills: 1 | Status: SHIPPED | OUTPATIENT
Start: 2021-03-24

## 2021-03-24 RX ORDER — OXYCODONE HYDROCHLORIDE 10 MG/1
10-15 TABLET ORAL
Qty: 32 TAB | Refills: 0 | Status: SHIPPED | OUTPATIENT
Start: 2021-03-24 | End: 2021-03-26

## 2021-03-24 RX ORDER — CEPHALEXIN 500 MG/1
500 CAPSULE ORAL 4 TIMES DAILY
Qty: 28 CAP | Refills: 0 | Status: SHIPPED | OUTPATIENT
Start: 2021-03-24 | End: 2021-03-31

## 2021-03-24 NOTE — PATIENT INSTRUCTIONS
Dr. Staci Hernandez Pre-operative Instructions: 
 
 
Patient: Hilary Lechuga :  1965 I understand I am to stop taking oral birth control pills, hormonal replacement therapy and all Aspirin, Aspirin containing medications, Non-Steroidal Anti-Inflammatory medications (such as Advil, Aleve, Motrin, Ibuprofen) and or Blood thinner medication such as Coumadin, Plavix, Heparin or others 5 days prior to surgery. I understand I am to STOP taking these Medications 5 days prior to surgery: 
I am to get instructions from my prescribing physician. 1. __As listed above_______________________ 2. _____________________________________ 3. _____________________________________ 4. _____________________________________ I understand that if I am taking daily medications for high blood pressure, I can take them the morning of surgery with a small sip of water. I will consult my prescribing physician or call [facility] with specific questions. I also understand that:  I am to report important observations or changes that may occur prior to surgery. If I have any changes in my physical condition, such as a rash, a fever, sore throat, abscess, ulcers, nausea, vomiting, or diarrhea. I am to call the office and I am to consult my primary care physician to assess and treat the problem.  I am not to eat or drink anything after midnight the night before my surgery.  I am not to drink alcoholic beverages 24 hours prior to surgery.  I am not to do any illegal drugs prior to surgery.  I am not to smoke at least 24 hours prior to surgery.  I am able and will shower or bathe before surgery. I will use the Hibiclens solution on my surgical site only. The hibiclens directions are one packet a day starting two days before surgery.  I will remove any nail polish, make-up or jewelry prior to arriving for my surgery.   
 
 If I wear glasses, contact lenses or dentures they must be removed prior to going to the operating room.  All body piercing and artifical eye-lashes must be removed prior to surgery  I will not wear any aerosol sprays, perfumes or skin creams.  I am to make arrangements for a family member or friend to accompany me to surgery and take me home after my surgery as I will not be allowed to leave the hospital alone. A cab or bus will not be acceptable. Please make arrange for someone to stay with you for 24 hours after surgery.  Patient has expressed understanding of the diagnosis, treatment and planned surgery Post operative medications will be e-scribed to your pharmacy on record if possible Acute Pain After Surgery: Care Instructions Your Care Instructions It's common to have some pain after surgery. Pain doesn't mean that something is wrong or that the surgery didn't go well. But when the pain is severe, it's important to work with your doctor to manage it. It's also important to be aware of a few facts about pain and pain medicine. · You are the only person who knows what your pain feels like. So be sure to tell your doctor when you are in pain or when the pain changes. Then he or she will know how to adjust your medicines. · Pain is often easier to control right after it starts. So it may be better to take regular doses of pain medicine and not wait until the pain gets bad. · Medicine can help control pain. But this doesn't mean you'll have no pain. Medicine works to keep the pain at a level you can live with. With time, you will feel better. Follow-up care is a key part of your treatment and safety. Be sure to make and go to all appointments, and call your doctor if you are having problems. It's also a good idea to know your test results and keep a list of the medicines you take. How can you care for yourself at home? · Be safe with medicines. Read and follow all instructions on the label.  
¨ If the doctor gave you a prescription medicine for pain, take it as prescribed. ¨ If you are not taking a prescription pain medicine, ask your doctor if you can take an over-the-counter medicine. · If you take an over-the-counter pain medicine, such as acetaminophen (Tylenol), ibuprofen (Advil, Motrin), or naproxen (Aleve), read and follow all instructions on the label. · Do not take two or more pain medicines at the same time unless the doctor told you to. · Do not drink alcohol while you are taking pain medicines. · Try to walk each day if your doctor recommends it. Start by walking a little more than you did the day before. Bit by bit, increase the amount you walk. Walking increases blood flow. It also helps prevent pneumonia and constipation. · To prevent constipation from opioid pain medicines: ¨ Talk to your doctor about a laxative. ¨ Include fruits, vegetables, beans, and whole grains in your diet each day. These foods are high in fiber. ¨ Drink plenty of fluids, enough so that your urine is light yellow or clear like water. Drink water, fruit juice, or other drinks that do not contain caffeine or alcohol. If you have kidney, heart, or liver disease and have to limit fluids, talk with your doctor before you increase the amount of fluids you drink. ¨ Take a fiber supplement, such as Citrucel or Metamucil, every day if needed. Read and follow all instructions on the label. If you take pain medicine for more than a few days, talk to your doctor before you take fiber. When should you call for help? Call your doctor now or seek immediate medical care if: 
 · Your pain gets worse. · Your pain is not controlled by medicine. Watch closely for changes in your health, and be sure to contact your doctor if you have any problems.

## 2021-03-24 NOTE — PROGRESS NOTES
Preoperative History and physical was completed during this encounter. Please see the H&P note for details. Orders Placed This Encounter    Generic Supply Order     Post op shoe    oxyCODONE IR (ROXICODONE) 10 mg tab immediate release tablet     Sig: Take 1-1.5 Tabs by mouth every eight (8) hours as needed for Pain (After surgery, not before) for up to 7 days. Max Daily Amount: 45 mg. Dispense:  32 Tab     Refill:  0    rivaroxaban (Xarelto) 10 mg tablet     Sig: TAKE ONE TABLET BY MOUTH PER DAY FOLLOWING SURGERY  Indications: treatment to prevent recurrence of a clot in a deep vein     Dispense:  30 Tab     Refill:  0    polyethylene glycol (Miralax) 17 gram packet     Sig: Take 1 Packet by mouth daily. Dispense:  10 Packet     Refill:  1    cephALEXin (KEFLEX) 500 mg capsule     Sig: Take 1 Cap by mouth four (4) times daily for 7 days. Dispense:  28 Cap     Refill:  0          Visit Vitals  /89   Pulse 61   Temp 97.5 °F (36.4 °C)   Ht 5' 3\" (1.6 m)   Wt 107 lb (48.5 kg)   BMI 18.95 kg/m²         Pain Assessment  3/24/2021   Location of Pain Foot   Location Modifiers Left   Severity of Pain 5   Quality of Pain Throbbing   Quality of Pain Comment -   Duration of Pain Persistent   Frequency of Pain Constant   Date Pain First Started -   Aggravating Factors Bending   Limiting Behavior Yes   Relieving Factors Rest   Relieving Factors Comment -   Result of Injury No           ICD-10-CM ICD-9-CM   1. Post-traumatic osteoarthritis of left foot  M19.172 715.27   2. Hammer toe of left foot  M20.42 735.4   3. Acquired mallet toe of left foot  M20.5X2 735.8   4. Rheumatoid arthritis involving multiple sites, unspecified whether rheumatoid factor present [facility]  M06.9 714.0   5. Pre-operative examination  Z01.818 V72.84         Carey Barrera, [facility], MPA, PA-C  3/24/2021  1:25 PM

## 2021-03-25 ENCOUNTER — ANESTHESIA EVENT (OUTPATIENT)
Dept: SURGERY | Age: 56
End: 2021-03-25
Payer: MEDICARE

## 2021-03-26 ENCOUNTER — HOSPITAL ENCOUNTER (OUTPATIENT)
Age: 56
Setting detail: OUTPATIENT SURGERY
Discharge: HOME | End: 2021-03-26
Attending: ORTHOPAEDIC SURGERY | Admitting: ORTHOPAEDIC SURGERY
Payer: MEDICARE

## 2021-03-26 ENCOUNTER — ANESTHESIA (OUTPATIENT)
Dept: SURGERY | Age: 56
End: 2021-03-26
Payer: MEDICARE

## 2021-03-26 ENCOUNTER — APPOINTMENT (OUTPATIENT)
Dept: GENERAL RADIOLOGY | Age: 56
End: 2021-03-26
Attending: ORTHOPAEDIC SURGERY
Payer: MEDICARE

## 2021-03-26 VITALS
OXYGEN SATURATION: 100 % | SYSTOLIC BLOOD PRESSURE: 110 MMHG | HEIGHT: 63 IN | BODY MASS INDEX: 18.61 KG/M2 | RESPIRATION RATE: 16 BRPM | HEART RATE: 70 BPM | TEMPERATURE: 97 F | WEIGHT: 105 LBS | DIASTOLIC BLOOD PRESSURE: 64 MMHG

## 2021-03-26 DIAGNOSIS — M20.42 HAMMER TOE OF LEFT FOOT: ICD-10-CM

## 2021-03-26 DIAGNOSIS — M05.772 RHEUMATOID ARTHRITIS INVOLVING LEFT FOOT WITH POSITIVE RHEUMATOID FACTOR (HCC): ICD-10-CM

## 2021-03-26 DIAGNOSIS — M19.072 LOCALIZED, PRIMARY OSTEOARTHRITIS OF THE ANKLE AND FOOT, LEFT: ICD-10-CM

## 2021-03-26 PROCEDURE — 77030020833 HC CAP PROTCT PIN ASPN -A: Performed by: ORTHOPAEDIC SURGERY

## 2021-03-26 PROCEDURE — 2709999900 HC NON-CHARGEABLE SUPPLY: Performed by: ORTHOPAEDIC SURGERY

## 2021-03-26 PROCEDURE — 77030040361 HC SLV COMPR DVT MDII -B: Performed by: ORTHOPAEDIC SURGERY

## 2021-03-26 PROCEDURE — 77030002916 HC SUT ETHLN J&J -A: Performed by: ORTHOPAEDIC SURGERY

## 2021-03-26 PROCEDURE — 73630 X-RAY EXAM OF FOOT: CPT

## 2021-03-26 PROCEDURE — C1713 ANCHOR/SCREW BN/BN,TIS/BN: HCPCS | Performed by: ORTHOPAEDIC SURGERY

## 2021-03-26 PROCEDURE — 77030026438 HC STYL ET INTUB CARD -A: Performed by: NURSE ANESTHETIST, CERTIFIED REGISTERED

## 2021-03-26 PROCEDURE — 28230 INCISION OF FOOT TENDON(S): CPT | Performed by: ORTHOPAEDIC SURGERY

## 2021-03-26 PROCEDURE — 74011250636 HC RX REV CODE- 250/636: Performed by: NURSE ANESTHETIST, CERTIFIED REGISTERED

## 2021-03-26 PROCEDURE — 74011000250 HC RX REV CODE- 250: Performed by: ORTHOPAEDIC SURGERY

## 2021-03-26 PROCEDURE — 01480 ANES OPEN PX LOWER L/A/F NOS: CPT | Performed by: NURSE ANESTHETIST, CERTIFIED REGISTERED

## 2021-03-26 PROCEDURE — 28072 REMOVAL OF FOOT JOINT LINING: CPT | Performed by: ORTHOPAEDIC SURGERY

## 2021-03-26 PROCEDURE — 74011250637 HC RX REV CODE- 250/637: Performed by: ANESTHESIOLOGY

## 2021-03-26 PROCEDURE — 77030008683 HC TU ET CUF COVD -A: Performed by: NURSE ANESTHETIST, CERTIFIED REGISTERED

## 2021-03-26 PROCEDURE — 76210000006 HC OR PH I REC 0.5 TO 1 HR: Performed by: ORTHOPAEDIC SURGERY

## 2021-03-26 PROCEDURE — 74011250636 HC RX REV CODE- 250/636: Performed by: PHYSICIAN ASSISTANT

## 2021-03-26 PROCEDURE — C1769 GUIDE WIRE: HCPCS | Performed by: ORTHOPAEDIC SURGERY

## 2021-03-26 PROCEDURE — 77030040922 HC BLNKT HYPOTHRM STRY -A: Performed by: ORTHOPAEDIC SURGERY

## 2021-03-26 PROCEDURE — 28285 REPAIR OF HAMMERTOE: CPT | Performed by: ORTHOPAEDIC SURGERY

## 2021-03-26 PROCEDURE — 77030020753 HC CUF TRNQT 1BLA STRY -B: Performed by: ORTHOPAEDIC SURGERY

## 2021-03-26 PROCEDURE — 77030013079 HC BLNKT BAIR HGGR 3M -A: Performed by: NURSE ANESTHETIST, CERTIFIED REGISTERED

## 2021-03-26 PROCEDURE — 74011250637 HC RX REV CODE- 250/637: Performed by: NURSE ANESTHETIST, CERTIFIED REGISTERED

## 2021-03-26 PROCEDURE — 77030034781 HC BIT DRL ACMD -D: Performed by: ORTHOPAEDIC SURGERY

## 2021-03-26 PROCEDURE — 76060000035 HC ANESTHESIA 2 TO 2.5 HR: Performed by: ORTHOPAEDIC SURGERY

## 2021-03-26 PROCEDURE — 77030042510 HC BIT DRL -D: Performed by: ORTHOPAEDIC SURGERY

## 2021-03-26 PROCEDURE — 77030031139 HC SUT VCRL2 J&J -A: Performed by: ORTHOPAEDIC SURGERY

## 2021-03-26 PROCEDURE — 77030013179 HC SHOE PSTOP OPN DJOR -A: Performed by: ORTHOPAEDIC SURGERY

## 2021-03-26 PROCEDURE — 28755 FUSION OF BIG TOE JOINT: CPT | Performed by: ORTHOPAEDIC SURGERY

## 2021-03-26 PROCEDURE — 76010000131 HC OR TIME 2 TO 2.5 HR: Performed by: ORTHOPAEDIC SURGERY

## 2021-03-26 PROCEDURE — 74011000250 HC RX REV CODE- 250: Performed by: NURSE ANESTHETIST, CERTIFIED REGISTERED

## 2021-03-26 PROCEDURE — 77030006773 HC BLD SAW OSC BRSM -A: Performed by: ORTHOPAEDIC SURGERY

## 2021-03-26 PROCEDURE — 76210000021 HC REC RM PH II 0.5 TO 1 HR: Performed by: ORTHOPAEDIC SURGERY

## 2021-03-26 PROCEDURE — 01480 ANES OPEN PX LOWER L/A/F NOS: CPT | Performed by: ANESTHESIOLOGY

## 2021-03-26 DEVICE — 30.0MM, MINI ACUTRAK 2® BONE SCREW
Type: IMPLANTABLE DEVICE | Status: FUNCTIONAL
Brand: ACUMED

## 2021-03-26 DEVICE — 26.0 MM, MICRO ACUTRAK 2® BONE SCREW
Type: IMPLANTABLE DEVICE | Status: FUNCTIONAL
Brand: ACUMED

## 2021-03-26 RX ORDER — PROPOFOL 10 MG/ML
INJECTION, EMULSION INTRAVENOUS AS NEEDED
Status: DISCONTINUED | OUTPATIENT
Start: 2021-03-26 | End: 2021-03-26 | Stop reason: HOSPADM

## 2021-03-26 RX ORDER — FAMOTIDINE 20 MG/1
20 TABLET, FILM COATED ORAL ONCE
Status: COMPLETED | OUTPATIENT
Start: 2021-03-26 | End: 2021-03-26

## 2021-03-26 RX ORDER — CEFAZOLIN SODIUM 2 G/50ML
2 SOLUTION INTRAVENOUS ONCE
Status: COMPLETED | OUTPATIENT
Start: 2021-03-26 | End: 2021-03-26

## 2021-03-26 RX ORDER — EPHEDRINE SULFATE/0.9% NACL/PF 25 MG/5 ML
SYRINGE (ML) INTRAVENOUS AS NEEDED
Status: DISCONTINUED | OUTPATIENT
Start: 2021-03-26 | End: 2021-03-26 | Stop reason: HOSPADM

## 2021-03-26 RX ORDER — BUPIVACAINE HYDROCHLORIDE 5 MG/ML
INJECTION, SOLUTION EPIDURAL; INTRACAUDAL AS NEEDED
Status: DISCONTINUED | OUTPATIENT
Start: 2021-03-26 | End: 2021-03-26 | Stop reason: HOSPADM

## 2021-03-26 RX ORDER — DEXAMETHASONE SODIUM PHOSPHATE 4 MG/ML
INJECTION, SOLUTION INTRA-ARTICULAR; INTRALESIONAL; INTRAMUSCULAR; INTRAVENOUS; SOFT TISSUE AS NEEDED
Status: DISCONTINUED | OUTPATIENT
Start: 2021-03-26 | End: 2021-03-26 | Stop reason: HOSPADM

## 2021-03-26 RX ORDER — SODIUM CHLORIDE, SODIUM LACTATE, POTASSIUM CHLORIDE, CALCIUM CHLORIDE 600; 310; 30; 20 MG/100ML; MG/100ML; MG/100ML; MG/100ML
100 INJECTION, SOLUTION INTRAVENOUS CONTINUOUS
Status: DISCONTINUED | OUTPATIENT
Start: 2021-03-26 | End: 2021-03-26 | Stop reason: HOSPADM

## 2021-03-26 RX ORDER — ROCURONIUM BROMIDE 10 MG/ML
INJECTION, SOLUTION INTRAVENOUS AS NEEDED
Status: DISCONTINUED | OUTPATIENT
Start: 2021-03-26 | End: 2021-03-26 | Stop reason: HOSPADM

## 2021-03-26 RX ORDER — ONDANSETRON 2 MG/ML
4 INJECTION INTRAMUSCULAR; INTRAVENOUS ONCE
Status: DISCONTINUED | OUTPATIENT
Start: 2021-03-26 | End: 2021-03-26 | Stop reason: HOSPADM

## 2021-03-26 RX ORDER — SODIUM CHLORIDE 0.9 % (FLUSH) 0.9 %
5-40 SYRINGE (ML) INJECTION EVERY 8 HOURS
Status: DISCONTINUED | OUTPATIENT
Start: 2021-03-26 | End: 2021-03-26 | Stop reason: HOSPADM

## 2021-03-26 RX ORDER — LIDOCAINE HYDROCHLORIDE 20 MG/ML
INJECTION, SOLUTION EPIDURAL; INFILTRATION; INTRACAUDAL; PERINEURAL AS NEEDED
Status: DISCONTINUED | OUTPATIENT
Start: 2021-03-26 | End: 2021-03-26 | Stop reason: HOSPADM

## 2021-03-26 RX ORDER — HYDROMORPHONE HYDROCHLORIDE 1 MG/ML
0.5 INJECTION, SOLUTION INTRAMUSCULAR; INTRAVENOUS; SUBCUTANEOUS AS NEEDED
Status: DISCONTINUED | OUTPATIENT
Start: 2021-03-26 | End: 2021-03-26 | Stop reason: HOSPADM

## 2021-03-26 RX ORDER — SUCCINYLCHOLINE CHLORIDE 20 MG/ML
INJECTION INTRAMUSCULAR; INTRAVENOUS AS NEEDED
Status: DISCONTINUED | OUTPATIENT
Start: 2021-03-26 | End: 2021-03-26 | Stop reason: HOSPADM

## 2021-03-26 RX ORDER — SODIUM CHLORIDE, SODIUM LACTATE, POTASSIUM CHLORIDE, CALCIUM CHLORIDE 600; 310; 30; 20 MG/100ML; MG/100ML; MG/100ML; MG/100ML
50 INJECTION, SOLUTION INTRAVENOUS CONTINUOUS
Status: DISCONTINUED | OUTPATIENT
Start: 2021-03-26 | End: 2021-03-26 | Stop reason: HOSPADM

## 2021-03-26 RX ORDER — FENTANYL CITRATE 50 UG/ML
INJECTION, SOLUTION INTRAMUSCULAR; INTRAVENOUS AS NEEDED
Status: DISCONTINUED | OUTPATIENT
Start: 2021-03-26 | End: 2021-03-26 | Stop reason: HOSPADM

## 2021-03-26 RX ORDER — SODIUM CHLORIDE, SODIUM LACTATE, POTASSIUM CHLORIDE, CALCIUM CHLORIDE 600; 310; 30; 20 MG/100ML; MG/100ML; MG/100ML; MG/100ML
75 INJECTION, SOLUTION INTRAVENOUS CONTINUOUS
Status: DISCONTINUED | OUTPATIENT
Start: 2021-03-26 | End: 2021-03-26 | Stop reason: HOSPADM

## 2021-03-26 RX ORDER — SODIUM CHLORIDE 0.9 % (FLUSH) 0.9 %
5-40 SYRINGE (ML) INJECTION AS NEEDED
Status: DISCONTINUED | OUTPATIENT
Start: 2021-03-26 | End: 2021-03-26 | Stop reason: HOSPADM

## 2021-03-26 RX ORDER — MIDAZOLAM HYDROCHLORIDE 1 MG/ML
INJECTION, SOLUTION INTRAMUSCULAR; INTRAVENOUS AS NEEDED
Status: DISCONTINUED | OUTPATIENT
Start: 2021-03-26 | End: 2021-03-26 | Stop reason: HOSPADM

## 2021-03-26 RX ORDER — NEOMYCIN AND POLYMYXIN B SULFATES 40; 200000 MG/ML; [USP'U]/ML
SOLUTION IRRIGATION AS NEEDED
Status: DISCONTINUED | OUTPATIENT
Start: 2021-03-26 | End: 2021-03-26 | Stop reason: HOSPADM

## 2021-03-26 RX ORDER — CHLORHEXIDINE GLUCONATE 4 G/100ML
SOLUTION TOPICAL AS NEEDED
Status: DISCONTINUED | OUTPATIENT
Start: 2021-03-26 | End: 2021-03-26 | Stop reason: HOSPADM

## 2021-03-26 RX ORDER — GLYCOPYRROLATE 0.2 MG/ML
INJECTION INTRAMUSCULAR; INTRAVENOUS AS NEEDED
Status: DISCONTINUED | OUTPATIENT
Start: 2021-03-26 | End: 2021-03-26 | Stop reason: HOSPADM

## 2021-03-26 RX ORDER — LIDOCAINE HYDROCHLORIDE 10 MG/ML
0.1 INJECTION, SOLUTION EPIDURAL; INFILTRATION; INTRACAUDAL; PERINEURAL AS NEEDED
Status: DISCONTINUED | OUTPATIENT
Start: 2021-03-26 | End: 2021-03-26 | Stop reason: HOSPADM

## 2021-03-26 RX ORDER — ONDANSETRON 2 MG/ML
INJECTION INTRAMUSCULAR; INTRAVENOUS AS NEEDED
Status: DISCONTINUED | OUTPATIENT
Start: 2021-03-26 | End: 2021-03-26 | Stop reason: HOSPADM

## 2021-03-26 RX ORDER — OXYCODONE HYDROCHLORIDE 5 MG/1
5 TABLET ORAL ONCE
Status: COMPLETED | OUTPATIENT
Start: 2021-03-26 | End: 2021-03-26

## 2021-03-26 RX ORDER — DEXTROSE 50 % IN WATER (D50W) INTRAVENOUS SYRINGE
25-50 AS NEEDED
Status: DISCONTINUED | OUTPATIENT
Start: 2021-03-26 | End: 2021-03-26 | Stop reason: HOSPADM

## 2021-03-26 RX ORDER — PHENYLEPHRINE HCL IN 0.9% NACL 1 MG/10 ML
SYRINGE (ML) INTRAVENOUS AS NEEDED
Status: DISCONTINUED | OUTPATIENT
Start: 2021-03-26 | End: 2021-03-26 | Stop reason: HOSPADM

## 2021-03-26 RX ORDER — MAGNESIUM SULFATE 100 %
4 CRYSTALS MISCELLANEOUS AS NEEDED
Status: DISCONTINUED | OUTPATIENT
Start: 2021-03-26 | End: 2021-03-26 | Stop reason: HOSPADM

## 2021-03-26 RX ADMIN — Medication 50 MCG: at 08:13

## 2021-03-26 RX ADMIN — Medication 10 MG: at 08:10

## 2021-03-26 RX ADMIN — FAMOTIDINE 20 MG: 20 TABLET ORAL at 06:55

## 2021-03-26 RX ADMIN — SODIUM CHLORIDE, SODIUM LACTATE, POTASSIUM CHLORIDE, AND CALCIUM CHLORIDE: 600; 310; 30; 20 INJECTION, SOLUTION INTRAVENOUS at 07:29

## 2021-03-26 RX ADMIN — FENTANYL CITRATE 50 MCG: 50 INJECTION, SOLUTION INTRAMUSCULAR; INTRAVENOUS at 07:43

## 2021-03-26 RX ADMIN — Medication 10 MG: at 07:53

## 2021-03-26 RX ADMIN — DEXAMETHASONE SODIUM PHOSPHATE 4 MG: 4 INJECTION, SOLUTION INTRAMUSCULAR; INTRAVENOUS at 08:07

## 2021-03-26 RX ADMIN — SODIUM CHLORIDE, SODIUM LACTATE, POTASSIUM CHLORIDE, AND CALCIUM CHLORIDE 50 ML/HR: 600; 310; 30; 20 INJECTION, SOLUTION INTRAVENOUS at 06:47

## 2021-03-26 RX ADMIN — CEFAZOLIN 2 G: 10 INJECTION, POWDER, FOR SOLUTION INTRAVENOUS at 07:30

## 2021-03-26 RX ADMIN — FENTANYL CITRATE 50 MCG: 50 INJECTION, SOLUTION INTRAMUSCULAR; INTRAVENOUS at 09:29

## 2021-03-26 RX ADMIN — ONDANSETRON 4 MG: 2 INJECTION INTRAMUSCULAR; INTRAVENOUS at 08:07

## 2021-03-26 RX ADMIN — OXYCODONE HYDROCHLORIDE 5 MG: 5 TABLET ORAL at 11:17

## 2021-03-26 RX ADMIN — ROCURONIUM BROMIDE 5 MG: 50 INJECTION INTRAVENOUS at 07:43

## 2021-03-26 RX ADMIN — FENTANYL CITRATE 50 MCG: 50 INJECTION, SOLUTION INTRAMUSCULAR; INTRAVENOUS at 07:30

## 2021-03-26 RX ADMIN — MIDAZOLAM HYDROCHLORIDE 2 MG: 2 INJECTION, SOLUTION INTRAMUSCULAR; INTRAVENOUS at 07:30

## 2021-03-26 RX ADMIN — LIDOCAINE HYDROCHLORIDE 80 MG: 20 INJECTION, SOLUTION EPIDURAL; INFILTRATION; INTRACAUDAL; PERINEURAL at 07:43

## 2021-03-26 RX ADMIN — GLYCOPYRROLATE 0.2 MG: 0.2 INJECTION INTRAMUSCULAR; INTRAVENOUS at 08:13

## 2021-03-26 RX ADMIN — Medication 10 MG: at 08:05

## 2021-03-26 RX ADMIN — PROPOFOL 150 MG: 10 INJECTION, EMULSION INTRAVENOUS at 07:43

## 2021-03-26 RX ADMIN — SUCCINYLCHOLINE CHLORIDE 100 MG: 20 INJECTION, SOLUTION INTRAMUSCULAR; INTRAVENOUS at 07:43

## 2021-03-26 NOTE — PROGRESS NOTES
conducted a pre-surgery visit with Lesly Romo, who is a 61 y.o.,female. The  provided the following Interventions:  Initiated a relationship of care and support. Plan:  Chaplains will continue to follow and will provide pastoral care on an as needed/requested basis.  recommends bedside caregivers page  on duty if patient shows signs of acute spiritual or emotional distress.     [address]  [phone]

## 2021-03-26 NOTE — DISCHARGE INSTRUCTIONS
ORTHOPAEDIC DISCHARGE PLAN     1. DISCHARGE DISPOSITION:  Home    2. FOLLOW UP RETURN TO OFFICE: 1 weeks    Call [phone] to confirm your appointment to see Dr. Vi Mckeon. Erinn Woods MD.   Follow up with Primary Care Provider in 7 to 10 days. 3. WEIGHT BEARING STATUS/ROM:    PARTIAL WEIGHT BEARING TO   to the Left LOWER EXTREMITY    4. ELEVATE the Left lower extremity on 1 pillow. Place the pillow horizontal so that no pressure is on the back of your heel    Please leave your current dressings and in place. Keep your dressings clean and dry to the: Left lower extremity      Please call [phone] ([facility]) if any: fever, shakes, chills, intractable pain, or for any questions you have regarding your care/medical condition   1. If you experience any calf pain or swelling, or are having any shortness of  breath, chest pain, or extremity swelling, or bleeding thru any surgical dressings,  or Bleeding at any body location while you are taking on any blood thinners. ie (mouth,nose, skin sites:)   2. Please go to closest ER  ASAP for assessment to rule out a leg clot and to  assess any  bleeding. 3. After general anesthesia or intravenous sedation, for 24 hours or while taking  prescription Narcotics:      [x]  Limit your activities     [x]   Do not drive and operate hazardous machinery    [x]   Do not make important personal or business decisions    [x]   Do  not drink alcoholic beverages    [x]  If you have not urinated within 8 hours after discharge, please contact    your surgeon on call.      Report the following to your surgeon: If any below is true         [x]   Excessive pain, swelling, redness or odor of or around the surgical area       [x]   Temperature over 100.5       [x]  Nausea and vomiting lasting longer than 4 hours or if unable to take medications       [x]    Any signs of decreased circulation or nerve impairment to extremity:    Change in color, persistent  numbness, tingling, coldness or increase pain          [x]  No smoking/ No tobacco products/ Avoid exposure to second hand smoke    5. DIET:  Regular Diet  OTC (Nutritional supplements/multivitamins/calcium w/ Vitamin  D     6. ACTIVITY:  // No lifting, twisting, squatting, deep bending. 7. INCISION CARE/DRESSINGS:   ,Sterile dressings: 4 x 4's/ABD's    Keep all pets away from  any wound present in order to prevent infection. 8. PAIN CONTROL: Prescriptions written: {MEDS; ANALGESICS:02674}  SEE BELOW     Start taking your pain medications when you get home    9. VTE prophylaxis : Start {ANTICOAGULATE:39257059} on date ***.    10. ANTIBIOTICS: {abx:16184}  None at this time    11. DME/ PRESCRIPTIONS WRITTEN ALREADY WRITTEN:       ORDERS PLACED DURING H&P:             Orders Placed This Encounter    Generic Supply Order       Post op shoe    oxyCODONE IR (ROXICODONE) 10 mg tab immediate release tablet       Sig: Take 1-1.5 Tabs by mouth every eight (8) hours as needed for Pain (After surgery, not before) for up to 7 days.  Max Daily Amount: 45 mg.       Dispense:  32 Tab       Refill:  0    rivaroxaban (Xarelto) 10 mg tablet       Sig: TAKE ONE TABLET BY MOUTH PER DAY FOLLOWING SURGERY  Indications: treatment to prevent recurrence of a clot in a deep vein       Dispense:  30 Tab       Refill:  0    polyethylene glycol (Miralax) 17 gram packet       Sig: Take 1 Packet by mouth daily.       Dispense:  10 Packet       Refill:  1    cephALEXin (KEFLEX) 500 mg capsule       Sig: Take 1 Cap by mouth four (4) times daily for 7 days.       Dispense:  28 Cap       Refill:  0      Post Operative Prescriptions have been escribed during the H&P to the [facility] on [address], MD  3/22/2021  9:42 AM    .Patient {UZMWSSTI:07325}

## 2021-03-26 NOTE — ANESTHESIA PREPROCEDURE EVALUATION
Anesthetic History   No history of anesthetic complications            Review of Systems / Medical History  Patient summary reviewed and pertinent labs reviewed    Pulmonary  Within defined limits                 Neuro/Psych   Within defined limits           Cardiovascular    Hypertension                   GI/Hepatic/Renal     GERD      PUD     Endo/Other  Within defined limits      Arthritis     Other Findings              Physical Exam    Airway  Mallampati: II  TM Distance: 4 - 6 cm  Neck ROM: normal range of motion   Mouth opening: Normal     Cardiovascular  Regular rate and rhythm,  S1 and S2 normal,  no murmur, click, rub, or gallop             Dental  No notable dental hx       Pulmonary  Breath sounds clear to auscultation               Abdominal  Abdominal exam normal       Other Findings            Anesthetic Plan    ASA: 2  Anesthesia type: general          Induction: Intravenous  Anesthetic plan and risks discussed with: Patient

## 2021-03-26 NOTE — ANESTHESIA POSTPROCEDURE EVALUATION
Procedure(s):  LEFT GREAT TOE INTERPHALANGEAL FUSION, FLEXOR TENOTOMY; LEFT FOURTH TOE REVISION INTERPHALANGEAL HAMMER TOE, PROXIMAL INTERPHALANGEAL FUSION/C-ARM/SYNTHES.    general    Anesthesia Post Evaluation      Multimodal analgesia: multimodal analgesia used between 6 hours prior to anesthesia start to PACU discharge  Patient location during evaluation: bedside  Patient participation: complete - patient cannot participate  Level of consciousness: awake  Pain score: 1  Pain management: adequate  Airway patency: patent  Anesthetic complications: no  Cardiovascular status: acceptable  Respiratory status: acceptable  Hydration status: acceptable  Post anesthesia nausea and vomiting:  none      INITIAL Post-op Vital signs:   Vitals Value Taken Time   /80 03/26/21 1043   Temp 36.4 °C (97.6 °F) 03/26/21 0954   Pulse 74 03/26/21 1045   Resp 13 03/26/21 1045   SpO2 100 % 03/26/21 1045   Vitals shown include unvalidated device data.

## 2021-03-26 NOTE — OP NOTES
Patient: Nancie Mortimer                MRN:@           SSN: [id number]   YOB: 1965         AGE: 61 y.o. SEX: female       OPERATIVE REPORT    Date of Procedure: 3/26/2021     Pre-Op Diagnosis: M20.42 HAMMER TOE OF LEFT FOOT  M20.5X2 ACQUIRED MALLET TOE OF LEFT FOOT  M19.172 POST-TRAUMATIC OSTEOARTHRITIS OF LEFT FOOT  LEFT 5TH TOE FLEXION DEFORMITY    Post-Op Diagnosis: Same as preoperative diagnosis. Procedure(s):  LEFT GREAT TOE INTERPHALANGEAL FUSION  OPEN GREAT TOE 82770  OPEN GREAT TOE FLEXOR TENOTOMY 83191  LEFT GREAT TOE IP SYNOVECTOMY: 10598  LEFT FOURTH TOE REVISION INTERPHALANGEAL HAMMER TOE PROXIMAL INTERPHALANGEAL FUSION 65868  OPEN FIFTH TOE FLEXOR TENOTOMY  38147  C-ARM/SYNTHES    Surgeon(s):  Navi Sandra MD Gerald Poplin PA:  ASSISTANT    Betzaida Mejia PA-C, DHSc  was medically necessary for the procedure. She assisted in : patient positioning, surgical incision retraction, placement of hardware, incision closure, placement of DME, and transfer of the patient to the PACU. Her role was critical for the success of this procedure. Surgical Assistant: Surg Asst-1: Kris Saab    Anesthesia: General and Local Anesthesia    IV FLUIDS:   900 ml IVF    Tourniquet Time:  72 minutes at  300  Mm HG     Estimated Blood Loss (mL): 20 ML    Complications: None    Specimens: * No specimens in log *     Implants:   Implant Name Type Inv.  Item Serial No.  Lot No. LRB No. Used Action   26 screw     [id number] Left 1 Implanted   mini screw# 30     [id number] Left 1 Implanted       Drains: * No LDAs found *    Findings: Angular deformity, rheumatoid arthritic degenerative angular deformity of the left great toe IP joint, angular deformity left #4 toe, recurrent angular deformity left #4 toe, flexion contracture left fifth toe    Electronically Signed by Carley Osorio MD on 3/26/2021 at 7:17 AM      OPERATIVE REPORT          Nancie Mortimer  was taken to the OR on 3/26/2021 . Hernán Gonzales  was placed supine on the OR table, general anesthesia attained, a well protected left thigh tourniquet was placed and then a bump under the left hip so that the left  foot was facing straight upward (no externally rotated). The left lower extremity was prepped with sterile solution: chlorhexidine scrub and paint. A formal verbal time out was conducted: identifying the patient, identifying the surgical location, reading the informed written signed consent for procedure, confirmation that the patient did receive preoperative antibiotics and that my signature on the patient was visible at the surgical field. All members of the surgical team agreed to the consent process. I exsanguinated the left lower extremity with an esmarch and then the tourniquet was inflated to 300 mm HG. I first made a 5 cm incision over this left great toe. She is a angular, valgus deformity left great toe at the IP joint. Incision made through skin subcutaneous tissues directly on extensor tendon. The IP joint was located, transverse incision was made, over the capsule. The patient had abundant synovitis, to this left great toe IP joint. An open synovectomy, was performed, the left great toe IP joint. I remove the extensor tendon, in this region, as well as the capsule to this left great toe IP joint. The joint was angulated laterally,. I used a sagittal saw, to remove a wedge of bone from the distal end of the proximal phalanx, and from the proximal end of the distal phalanx. Making sure that is perpendicular to bone, making sure that a corrective osteotomy was performed to straighten the toe. After thorough irrigation, I then definitively fixed, the left great toe IP region, left great toe IP joint, with a Acumed AccuTrack mini, (blue-colored compression screw), appropriate length. 30 million length screw was used.   The toe was no longer in valgus, no longer overlapping and no longer touching the #2 toe. I then made a incision, the left fourth toe, linear incision, 3 and half centimeters vision. Incision made the skin subcutaneous tissues directly down extensor tendon, directly down to the PIP joint. Arthrotomy was performed, at this fourth toe PIP region. Is a sagittal saw to remove a wedge of bone, from the distal end of the of the proximal phalanx. The middle phalanx bone was very small, so I did not remove any bone from this. I desponded to straighten the toe so would no longer be curling underneath the third toe. This fourth toe, was then fixed and transfixed and straighten, using an AccuTrack Acumed micro (pink-colored), appropriate length screw. Passing the wire from proximal to distal and distal to proximal checking her fluoroscopy make sure the pin was in excellent position then placing the compression screw after drilling. No longer was this fourth toe, angling underneath the third toe. Attention was then directed towards the fifth toe. The patient has flexion contracture of the fifth toe. As such through a 1 cm open section on the plantar portion of the left fifth toe, incision made through skin subcutaneous tissue directly onto the PIP capsule and to the flexor tendon. Saw the flexor tendon of flexor tenotomy was performed. Capsulotomies performed. Next tourniquet was deflated selective electrocauterization was used for hemostasis. After thorough rogation, the great toe incision was then closed with 3-0 Vicryl 3-0 Monocryl 3-0 nylon. The #2 toe was closed with 3-0 Monocryl 3-0 nylon. The incisions on the tips of the #4 toe #1 toe were closed with 3-0 nylon. The fifth toe flexor tendon open scissors closed with 3-0 nylon. All incisions were closed with 3-0 nylon. I then used 20 mL of half percent plain Marcaine to the size of the surgical incision.   Dressings were then placed Xeroform 4 x 4's 4 inch and 3 inch soft cast padding and then a 3 inch Ace wrap.  She was x-rayed and transferred to the cart room stable condition no complications. Correct tape and instrument counts were noted. Sterile dressings were placed: Xeroform, 4 X 4 gauze 4 inch Kerlix, and 4 in ACE wrap. He was transferred to  in stable condition. Closure after tourniquet deflation and hemostasis attained.       This patient's mother informing her that surgery had gone well.    charisse iglesias surgery update and  Mother [phone] DISCHARGE CAREGIVER             Isabelle Somers MD  3/26/2021  9:55 AM

## 2021-03-26 NOTE — BRIEF OP NOTE
Brief Postoperative Note    Patient: Ozzie Hunt  YOB: 1965  MRN: [id number]    Date of Procedure: 3/26/2021     Pre-Op Diagnosis: M20.42 HAMMER TOE OF LEFT FOOT  M20.5X2 ACQUIRED MALLET TOE OF LEFT FOOT  M19.172 POST-TRAUMATIC OSTEOARTHRITIS OF LEFT FOOT  LEFT 5TH TOE FLEXION DEFORMITY    Post-Op Diagnosis: Same as preoperative diagnosis. Procedure(s):  LEFT GREAT TOE INTERPHALANGEAL FUSION  OPEN GREAT TOE 43168  OPEN GREAT TOE FLEXOR TENOTOMY 53217  LEFT GREAT TOE IP SYNOVECTOMY: 74722  LEFT FOURTH TOE REVISION INTERPHALANGEAL HAMMER TOE PROXIMAL INTERPHALANGEAL FUSION 34781  OPEN FIFTH TOE FLEXOR TENOTOMY  72371  C-ARM/SYNTHES    Surgeon(s):  Henry Sandra MD Catarino Halls PA:  ASSISTANT    Will Weiss PA-C, DHSc  was medically necessary for the procedure. She assisted in : patient positioning, surgical incision retraction, placement of hardware, incision closure, placement of DME, and transfer of the patient to the PACU. Her role was critical for the success of this procedure. Surgical Assistant: Surg Asst-1: Kris Saab    Anesthesia: General and Local Anesthesia    IV FLUIDS:   900 ml IVF    Tourniquet Time:  72 minutes at  300  Mm HG     Estimated Blood Loss (mL): 20 ML    Complications: None    Specimens: * No specimens in log *     Implants:   Implant Name Type Inv.  Item Serial No.  Lot No. LRB No. Used Action   26 screw     [id number] Left 1 Implanted   mini screw# 30     [id number] Left 1 Implanted       Drains: * No LDAs found *    Findings: Angular deformity, rheumatoid arthritic degenerative angular deformity of the left great toe IP joint, angular deformity left #4 toe, recurrent angular deformity left #4 toe, flexion contracture left fifth toe    Electronically Signed by Nicole Lakhani MD on 3/26/2021 at 7:17 AM

## 2021-03-26 NOTE — INTERVAL H&P NOTE
Update History & Physical 
 
The Patient's History and Physical of March 26,  
3/26/2021 7:13 AM   was reviewed with the patient and I examined the patient. There was no change. The surgical site was confirmed by the patient and me. Plan:  The risk, benefits, expected outcome, and alternative to the recommended procedure have been discussed with the patient. Patient understands and wants to proceed with the procedure.  
 
Electronically signed by Abran Baeza MD on 3/26/2021 at 7:12 AM

## 2021-03-31 ENCOUNTER — OFFICE VISIT (OUTPATIENT)
Dept: ORTHOPEDIC SURGERY | Age: 56
End: 2021-03-31
Payer: COMMERCIAL

## 2021-03-31 VITALS — TEMPERATURE: 97.5 F | HEART RATE: 63 BPM | RESPIRATION RATE: 16 BRPM

## 2021-03-31 DIAGNOSIS — Z98.890 POST-OPERATIVE STATE: ICD-10-CM

## 2021-03-31 DIAGNOSIS — M19.172 POST-TRAUMATIC OSTEOARTHRITIS OF LEFT FOOT: ICD-10-CM

## 2021-03-31 DIAGNOSIS — M20.42 HAMMER TOE OF LEFT FOOT: Primary | ICD-10-CM

## 2021-03-31 DIAGNOSIS — M79.672 LEFT FOOT PAIN: ICD-10-CM

## 2021-03-31 DIAGNOSIS — M20.5X2 ACQUIRED MALLET TOE OF LEFT FOOT: ICD-10-CM

## 2021-03-31 PROCEDURE — 73630 X-RAY EXAM OF FOOT: CPT | Performed by: PHYSICIAN ASSISTANT

## 2021-03-31 PROCEDURE — 99024 POSTOP FOLLOW-UP VISIT: CPT | Performed by: PHYSICIAN ASSISTANT

## 2021-03-31 NOTE — PATIENT INSTRUCTIONS
· Continue activity modification    · Weight bearing status:  no weight bearing to the surgical extremity    · No lifting, twisting, squatting, deep bending, driving or strenuous activity. · Please follow up as instructed    · Please take medication as directed    · Follow RICE protocol (Rest, Ice, Compression, Elevation). Please a covering over skin for protection     · Maintain proper Nutrition    · Take a multivitamin, calcium + Vitamin D supplement daily (if tolerated)    · If you are a current smoker, quit or limit smoking    · Keep the current dressings on and in place. You need to keep this incision clean and dry. If you have a splint or cast on please keep this clean and dry as well. · You should expect swelling and bruising in the area over the next several days. You may elevate the surgical extremity on 1 pillow. Place the pillow horizontal so that no pressure is on the back of your heel. You may apply an icepack on top of the dressing to help minimize the swelling.      PLEASE SEEK IMMEDIATE ASSESSMENT BY ER PHYSICIAN IF ANY OF THE FOLLOWING EXIST:      Excessive pain, swelling, redness or odor of or around the surgical area    Temperature over 100.5    Nausea and vomiting lasting longer than 4 hours or if unable to take medications    Any signs of decreased circulation or nerve impairment to extremity: change in color, persistent numbness, tingling, coldness or increase pain    If any calf pain, calf tightness, shortness of breath, chest pain    Any difficulty breathing at rest or with ambulation, any chest tightness/soreness   Severe intractable pain, persistent swelling or drainage, development of a wound, incisional redness, finger/toe swelling or color changes, or CALF PAIN    · Perform ankle pumps with non-surgical/non-injured extremity to help reduce the risk of blood clots    · Keep all pets away from  any wound present in order to prevent infection    · If you a reminder for a \"due or due soon\" health maintenance, please contact your primary care provider for follow-up on this health maintenance    Acute Pain After Surgery: Care Instructions  Your Care Instructions    It's common to have some pain after surgery. Pain doesn't mean that something is wrong or that the surgery didn't go well. But when the pain is severe, it's important to work with your doctor to manage it. It's also important to be aware of a few facts about pain and pain medicine. · You are the only person who knows what your pain feels like. So be sure to tell your doctor when you are in pain or when the pain changes. Then he or she will know how to adjust your medicines. · Pain is often easier to control right after it starts. So it may be better to take regular doses of pain medicine and not wait until the pain gets bad. · Medicine can help control pain. But this doesn't mean you'll have no pain. Medicine works to keep the pain at a level you can live with. With time, you will feel better. Follow-up care is a key part of your treatment and safety. Be sure to make and go to all appointments, and call your doctor if you are having problems. It's also a good idea to know your test results and keep a list of the medicines you take. How can you care for yourself at home? · Be safe with medicines. Read and follow all instructions on the label. ? If the doctor gave you a prescription medicine for pain, take it as prescribed. ? If you are not taking a prescription pain medicine, ask your doctor if you can take an over-the-counter medicine. · If you take an over-the-counter pain medicine, such as acetaminophen (Tylenol), ibuprofen (Advil, Motrin), or naproxen (Aleve), read and follow all instructions on the label. · Do not take two or more pain medicines at the same time unless the doctor told you to. · Do not drink alcohol while you are taking pain medicines. · Try to walk each day if your doctor recommends it. Start by walking a little more than you did the day before. Bit by bit, increase the amount you walk. Walking increases blood flow. It also helps prevent pneumonia and constipation. · To prevent constipation from opioid pain medicines:  ? Talk to your doctor about a laxative. ? Include fruits, vegetables, beans, and whole grains in your diet each day. These foods are high in fiber. ? Drink plenty of fluids, enough so that your urine is light yellow or clear like water. Drink water, fruit juice, or other drinks that do not contain caffeine or alcohol. If you have kidney, heart, or liver disease and have to limit fluids, talk with your doctor before you increase the amount of fluids you drink. ? Take a fiber supplement, such as Citrucel or Metamucil, every day if needed. Read and follow all instructions on the label. If you take pain medicine for more than a few days, talk to your doctor before you take fiber.

## 2021-03-31 NOTE — PROGRESS NOTES
Maria Teresa Underwood (1965) is a 61 y.o. female, established patient, 5 days s/p Left Great Toe Interphalangeal Fusion, Flexor Tenotomy; Left Fourth Toe Revision Interphalangeal Hammer Toe, Proximal Interphalangeal Fusion/c-arm/synthes - Left completed on 3/26/2021, and she is here for evaluation of the following chief complaint(s):    Chief Complaint   Patient presents with    Surgical Follow-up     left foot          ASSESSMENT & PLAN:     Diagnoses and all orders for this visit:    1. Hammer toe of left foot    2. Acquired mallet toe of left foot    3. Post-traumatic osteoarthritis of left foot    4. Left foot pain  -     AMB POC XRAY, FOOT; COMPLETE, 3+ VIEW    5. Post-operative state           ICD-10-CM ICD-9-CM   1. Hammer toe of left foot  M20.42 735.4   2. Acquired mallet toe of left foot  M20.5X2 735.8   3. Post-traumatic osteoarthritis of left foot  M19.172 715.27   4. Left foot pain  M79.672 729.5   5. Post-operative state  Z98.890 V45.89       There are no Patient Instructions on file for this visit. Dr. Katharine Henao has been consulted regarding the patient during this visit and he agrees with the assessment and plan    Patient expresses understanding of the diagnosis and treatment plan. Patient education has been provided. Maria Teresa Underwood may have a reminder for a \"due or due soon\" health maintenance. I have asked that she contact her primary care provider for follow-up on this health maintenance.  was reviewed by Anaay Jane PA-C on 3/31/2021. SUBJECTIVE & OBJECTIVE:     HPI    Since last seen in the office, the patient reports that she is doing well. She states that she was not able to get the Xarelto due to cost (and she cannot take ASA). She has been ambulating on her heel and states she gets in more than 5000 steps per day so she would prefer to go without any additional DVT prophylaxis. Patient denies any fever, chills, CP, SOB or calf pain.  The patient denies any no new illness or injuries to report since last seen in the office. There are no reported changes in medications, allergies, social or family history. Yris Clancy has been experiencing pain, discomfort and associated symptoms and has tried modalities of treatment and/or self treatment confirmed as outlined in the pain assessment below. Pain Assessment  3/31/2021   Location of Pain -   Location Modifiers -   Severity of Pain 4   Quality of Pain Throbbing; Other (Comment); Sharp   Quality of Pain Comment stinging   Duration of Pain Persistent   Frequency of Pain Constant   Date Pain First Started -   Aggravating Factors Walking   Limiting Behavior Yes   Relieving Factors Elevation   Relieving Factors Comment -   Result of Injury -          Yris Clancy  has a past medical history of Anemia, Chronic pain, Contact lens/glasses fitting, Frequent UTI (2006), GERD (gastroesophageal reflux disease), H/O eating disorder, Hip fracture, left ([facility] [address] (2000), Hypertension, Leg length discrepancy, Needs pre-anesthesia assessment, Post herpetic neuralgia, PUD (peptic ulcer disease), Rheumatoid arthritis ([facility] Rheumatoid arthritis [facility]), Shingles (April 2014), and Tooth abscess (10/6/15). . Other than previously noted, patient reports no changes in medications, allergies, social or family history. REVIEW OF SYSTEMS:                  All Below are Negative except as indicated in the HPI: See HPI                Constitutional: negative for fever, chills, night sweats and unexpected weight loss and malaise/fatigue              HEENT: Negative. No hoarseness, no double vision              Respiratory: Negative.  No difficulty breathing, No SOB              Cardiovascular: negative for chest pain, claudication, HOFFMANN. (-) Leg swelling               Gastrointestinal: Negative for pain, Blood in stool, incontinence, pelvic pain, N/V/C/D              Genitourinary: No saddle anesthesia, pelvic pain, blood in urine, incontinence, dysuria Neurological: Negative for dizziness and weakness, visual changes, confusion, headaches, seizures               Phychiatric/Behavioral: Negative for depression, memory loss, substance abuse               Extremities: Negative for hair changes, rash, or skin lesion changes              Hematologic: Negative for bleeding problems, bruising, pallor or swollen lymph nodes              Peripheral Vascular: No calf pain, no circulation deficits              Musculoskeletal: As per HPI above      PHYSICAL EXAM:        Visit Vitals  Pulse 63   Temp 97.5 °F (36.4 °C)   Resp 16         Constitutional: [x] Alert, cooperative, appears well-developed and well-nourished [x] No apparent distress      [] Abnormal - There is no height or weight on file to calculate BMI. makes the treatment plan more complex     Mental status: [x] Alert and awake  [x] Oriented to person/place/time   [x] Normal mood/behavior/speech   [x] Normal dress/motor activity/thought processes/memory      [x] Able to follow commands    [] Abnormal - Dementia    Eyes:   EOM    [x]  Normal    [] Abnormal -   Sclera  [x]  Normal    [] Abnormal -          Discharge [x]  None visible   [] Abnormal -     HENT: [x] Normocephalic, atraumatic  [] Abnormal -   [x] Mouth/Throat: Mucous membranes are moist    External Ears [x] Normal, hearing intact  [] Abnormal -    Neck: [x] Supple.  No visualized mass, normal ROM [] Abnormal -     Pulmonary/Chest: [x] Respiratory effort normal   [x] No visualized signs of difficulty breathing or respiratory distress        [] Abnormal -        Cardiovascular/    [x] Normal pulses to each foot  [] Abnormal -   Peripheral Vascular:    Neurological:        [x] No Facial Asymmetry (Cranial nerve 7 motor function) (limited exam due to video visit) [x] No gross defects         [x] No gaze palsy        [] Abnormal -          Skin:        [x] No significant exanthematous lesions or discoloration noted on facial skin or visible areas       [] Abnormal -            Psychiatric:       [x] Normal Affect, mood, judgement, behavior, conduct [] Abnormal -        [x] No Hallucinations      Musculoskeletal:   [x] Normal gait with no signs of ataxia         [x] Normal range of motion of neck        [] Abnormal -       MUSCULOSKELETAL: EXAMINATION OF:     ANKLE/FOOT left    DRESSINGS: CDI   DRAINAGE: none   INCISION: Incision looks good, skin well approximated, no dehiscence, nylon sutures in place without disruption. SKIN: Mild edema, no erythema, no ecchymosis, no warmth. No rash or skin lesions. No signs of infection or cellulitis present. TENDERNESS:  Mild tenderness to palpation forefoot  NEUROVASCULAR:  Grossly intact. Motor/Sensory: NL/NL. Positive distal pulses and capillary refill. DVT ASSESSMENT:  The calf is not tender to palpation. No evidence of DVT seen on physical exam.  LYMPHATICS: No extremity lymphedema, No calf swelling, no tenderness to calf muscles  JOINT MOTION: Not tested. There is pain-free range of motion of adjacent joints. Negative joint effusion  JOINT/TENDON STABILITY: No Ankle or Subtalar instability or joint laxity. No peroneal sublux ability or dislocation  ALIGNMENT: Forefoot, Midfoot, Hindfoot WNL. An electronic signature was used to authenticate this note. -- Lexi Glass.  [facility], [place], PA-C  3/31/2021      Disclaimer: Sections of this note may have been dictated utilizing voice recognition software, which may have resulted in some phonetic based errors in grammar and contents. Even though attempts were made to correct all the mistakes, some may have been missed, and remained in the body of the document. If questions arise, please contact our department. RADIOGRAPHS & DIAGNOSTIC STUDIES      Left foot X-rays, 3 views, AP/LAT/OBL completed 3/31/2021 AT [facility]      FINDINGS:    X-rays reveal post op changes s/p Left Great Toe Interphalangeal Fusion, Flexor Tenotomy;  Left Fourth Toe Revision Interphalangeal Hammer Toe, Proximal Interphalangeal Fusion/c-arm/synthes - Left. Overall alignment is acceptable. Hardware is in good position with no indication of movement or failure. Soft tissue swelling is mild. Degenerative changes are noted. Mineralization suggests  osteopenia. Calcified vessels are not present. IMPRESSION:    As stated above      I have personally reviewed the results of the above study.  The interpretation of this study is my professional opinion    3/31/2021  Yahaira Dougherty PA-C

## 2021-04-19 ENCOUNTER — OFFICE VISIT (OUTPATIENT)
Dept: ORTHOPEDIC SURGERY | Age: 56
End: 2021-04-19
Payer: MEDICARE

## 2021-04-19 VITALS
WEIGHT: 105 LBS | HEART RATE: 79 BPM | TEMPERATURE: 98 F | BODY MASS INDEX: 18.61 KG/M2 | OXYGEN SATURATION: 100 % | HEIGHT: 63 IN

## 2021-04-19 DIAGNOSIS — M20.5X2 ACQUIRED MALLET TOE OF LEFT FOOT: ICD-10-CM

## 2021-04-19 DIAGNOSIS — M20.42 HAMMER TOE OF LEFT FOOT: Primary | ICD-10-CM

## 2021-04-19 PROCEDURE — 99024 POSTOP FOLLOW-UP VISIT: CPT | Performed by: PHYSICIAN ASSISTANT

## 2021-04-19 RX ORDER — OXYCODONE HYDROCHLORIDE 5 MG/1
5 TABLET ORAL
COMMUNITY
End: 2021-08-04

## 2021-04-19 NOTE — PROGRESS NOTES
Patient: Jasmin Montero             MRN: [id number]     SSN: [id number] Body mass index is 18.6 kg/m². YOB: 1965     AGE: 61 y.o. EX: female    PCP: Jeronimo Youssef MD      ORTHOPAEDIC SURGERY       Left Great Toe Interphalangeal Fusion, Flexor Tenotomy; Left Fourth Toe Revision Interphalangeal Hammer Toe, Proximal Interphalangeal Fusion/c-arm/synthes - Left   * No surgery found *  3/26/2021        IMPRESSION //  DIAGNOSIS AND TREATMENT PLAN      DIAGNOSES  1. Hammer toe of left foot    2. Acquired mallet toe of left foot        Orders Placed This Encounter    oxyCODONE IR (ROXICODONE) 5 mg immediate release tablet     Sig: Take 5 mg by mouth every six (6) hours as needed for Pain. Jasmin Montero IS A 61 y.o. female who is a/an  established patient , presenting to my outpatient office for postoperative evaluation having had surgical intervention  ON ago. 3/26/2021     She is doing well/// reports 4/10 pain. PLAN:    1. RTO in: 3 weeks  2. DVT Prophylaxis :Chemo Prophylaxis:  DVT Prophylaxis, Encourage Opposite Leg: active ankle DF/PF motion for endogenous fibrinolysis  3. Pain Management:      4. Antibiotics:    None at this time    5. Weight Bearing Status:    WBAT   In HARD  SHOE  6. Keep dressings clean/dry// keep all pets away from dressings/incisions  7.  Additional Instructions:      PLEASE SEEK IMMEDIATE ASSESSMENT BY ER PHYSICIAN IF ANY OF THE  FOLLOWING EXIST:      Excessive pain, swelling, redness or odor of or around the surgical area    Temperature over 100.5    Nausea and vomiting lasting longer than 4 hours or if unable to take medications    Any signs of decreased circulation or nerve impairment to extremity: change in color, persistent numbness, tingling, coldness or increase pain    If any calf pain, calf tightness, shortness of breath, chest pain    Any difficulty breathing at rest or with ambulation, any chest tightness/soreness   Severe intractable pain, persistent swelling or drainage, development of a wound, incisional redness, finger/toe swelling or color changes, or CALF PAIN     8. Sutures removed by Shai Moore MD  . Nilsa Jack soliz get foot wet in shower//// cover foot with dressings until Friday 4/23/2021. HPI //  Noa Sharma is a 61 y.o. female who presents today for follow up 24 days s/p above mentioned surgery. Jocy Avendano has been PWB to the left lower extremity. Patient reports doing well other than SORENESS TO LEFT FOOT . Patient denies any fever, chills, chest pain, shortness of breath or calf pain. There are no new illness or injuries to report since last seen in the office. Patient is on  ASA  for DVT prophylaxis. Visit Vitals  Pulse 79   Temp 98 °F (36.7 °C) (Skin)   Ht 5' 3\" (1.6 m)   Wt 105 lb (47.6 kg)   SpO2 100%   BMI 18.60 kg/m²       ANKLE/FOOT left    Psychiatry: Alert, oriented x 3 (name,place,time of day); speech normal in context and clarity, memory intact grossly, no involuntary movements - tremors, no dementia  Gait: slow    SURGICAL DRESSINGS:   Clean,dry , intact  TENDERNESS:mild incisional tenderness as (to be expected after surgery). SKIN INCISION: Incision looks good, No erythema, No Drainage  healing well  NEUROVASCULAR:  is grossly intact. Positive distal pulses and capillary refill. DVT ASSESSMENT: No evidence of DVT seen on physical exam.   Alignment: great toe anf 4 toe alignment are better  Neuro Motor/Sensory: no change  ROM:  [x] NORMAL  Ankle  [] Improved/Improving  [] Poor  [] No significant change [] Not tested due to proximity in time of the surgery /// postop   Vascular: NL foot/ankle pulses  Lymphatics:  No calf swelling, no tenderness to calf muscles. CHART REVIEW     There is no problem list on file for this patient. Jocy Avendano has been experiencing pain and discomfort confirmed as outlined in the pain assessment outlined below. Pain Assessment  4/19/2021   Location of Pain Foot   Location Modifiers Left   Severity of Pain 4   Quality of Pain -   Quality of Pain Comment -   Duration of Pain Persistent   Frequency of Pain Other (Comment)   Frequency of Pain Comment while on feet   Date Pain First Started -   Aggravating Factors Standing;Walking   Limiting Behavior Some   Relieving Factors Rest;Elevation   Relieving Factors Comment -   Result of Injury No        Giles Chick  has a past medical history of Anemia, Chronic pain, Contact lens/glasses fitting, Frequent UTI (2006), GERD (gastroesophageal reflux disease), H/O eating disorder, Hip fracture, left ([address] (2000), Hypertension, Leg length discrepancy, Needs pre-anesthesia assessment, Post herpetic neuralgia, PUD (peptic ulcer disease), Rheumatoid arthritis ([facility] [address] Rheumatoid arthritis [facility]), Shingles (April 2014), and Tooth abscess (10/6/15).      Patients is employed at:         Past Medical History:   Diagnosis Date    Anemia     Chronic pain     Contact lens/glasses fitting     Frequent UTI 2006    GERD (gastroesophageal reflux disease)     H/O eating disorder     remote    Hip fracture, left ([address] 2000    Hypertension     Leg length discrepancy     Needs pre-anesthesia assessment     Patient reports a history of high tolerance to pain medicine and prior hisoty of drug use    Post herpetic neuralgia     PUD (peptic ulcer disease)     Rheumatoid arthritis ([address]     Rheumatoid arthritis ([address]     Shingles April 2014    Tooth abscess 10/6/15    Completed Amoxicillin      Past Surgical History:   Procedure Laterality Date    HX COLONOSCOPY      HX GYN  2012    fibroids    HX ORTHOPAEDIC Left 2012    partial hip replacement    HX ORTHOPAEDIC  2009    attempt bunionectomy    HX ORTHOPAEDIC  10/2015    Dr. Wong Nicolas: First Naida Mullins HX ORTHOPAEDIC  2014    redo left hip replacement    NEUROLOGICAL PROCEDURE UNLISTED Bilateral 2016    Thalamotomy Current Outpatient Medications   Medication Sig    oxyCODONE IR (ROXICODONE) 5 mg immediate release tablet Take 5 mg by mouth every six (6) hours as needed for Pain.  rivaroxaban (Xarelto) 10 mg tablet TAKE ONE TABLET BY MOUTH PER DAY FOLLOWING SURGERY  Indications: treatment to prevent recurrence of a clot in a deep vein    polyethylene glycol (Miralax) 17 gram packet Take 1 Packet by mouth daily.  levETIRAcetam (Keppra) 1,000 mg tablet Take 1,500 mg by mouth daily. Indications: Neuralgia    gabapentin (NEURONTIN) 600 mg tablet Take 600 mg by mouth three (3) times daily.  methotrexate (RHEUMATREX) 2.5 mg tablet Take 15 mg by mouth every Sunday.  folic acid (FOLVITE) 1 mg tablet Take  by mouth daily.  hydrOXYchloroQUINE (PLAQUENIL) 200 mg tablet Take 200 mg by mouth two (2) times a day.  azaTHIOprine (Imuran) 50 mg tablet Take 50 mg by mouth three (3) times daily.  prochlorperazine (COMPAZINE) 10 mg tablet Take 5 mg by mouth every six (6) hours as needed.  polyethylene glycol (MIRALAX) 17 gram/dose powder Take 17 g by mouth daily.  dextroamphetamine-amphetamine (ADDERALL) 10 mg tablet Take 10 mg by mouth two (2) times a day.  0.9% sodium chloride solution     medroxyPROGESTERone (PROVERA) 5 mg tablet Take 5 mg by mouth daily.  imipramine (TOFRANIL) 25 mg tablet Take 300 mg by mouth nightly.  polyethylene glycol (MIRALAX) 17 gram/dose powder Take 17 g by mouth daily.  CALCIUM CARBONATE/VITAMIN D3 (CALCIUM+D PO) Take  by mouth.  multivitamin (ONE A DAY) tablet Take 1 Tab by mouth daily.  estradiol (ESTRACE) 0.5 mg tablet Take  by mouth.  dronabinol (MARINOL) 5 mg capsule Take 5 mg by mouth two (2) times daily as needed. Indications: HEADACHE    carvedilol (COREG) 6.25 mg tablet Take 6.25 mg by mouth two (2) times a day.  alendronate (FOSAMAX) 70 mg tablet Take  by mouth every Sunday.  venlafaxine-SR (EFFEXOR XR) 150 mg capsule Take  by mouth daily.     baclofen (LIORESAL) 10 mg tablet Take 20 mg by mouth four (4) times daily.  pantoprazole (PROTONIX) 40 mg tablet Take 40 mg by mouth daily. Twice a day    predniSONE (DELTASONE) 10 mg tablet Take  by mouth daily (with breakfast).  HYDROmorphone (DILAUDID) 2 mg tablet Take one tablet PO Q 6 HRS as needed for **POST OPERATIVE BREAKTHROUGH PAIN**    HYDROmorphone (DILAUDID) 2 mg tablet TAKE ONE TABLET BY MOUTH Q 6 HRS PRN for POST OPERATIVE BREAKTHROUGH PAIN  **DO NOT FILL BEFORE 1/10/17**  Indications: PAIN    promethazine (PHENERGAN) 25 mg tablet Take 1 Tab by mouth every six (6) hours as needed for Nausea.  biotin 10,000 mcg cap Take  by mouth.  VITAMIN B COMPLEX PO Take  by mouth.  lamoTRIgine (LAMICTAL) 100 mg tablet Take  by mouth two (2) times a day.  ondansetron hcl (ZOFRAN, AS HYDROCHLORIDE,) 8 mg tablet Take 8 mg by mouth every eight (8) hours as needed for Nausea.  methylphenidate (RITALIN) 10 mg tablet Take  by mouth three (3) times daily. No current facility-administered medications for this visit. Allergies   Allergen Reactions    Aspirin Unknown (comments)     Severe stomach pain and bleeding    Lyrica [Pregabalin] Other (comments)     Slurred speech    Nsaids (Non-Steroidal Anti-Inflammatory Drug) Other (comments)     Hx  Of bleeding ulcers    Sulfa (Sulfonamide Antibiotics) Rash    Tetracycline Hives     Social History     Occupational History    Not on file   Tobacco Use    Smoking status: Former Smoker     Quit date: 2015     Years since quittin.7    Smokeless tobacco: Never Used   Substance and Sexual Activity    Alcohol use: No    Drug use: No     Comment: Prior history of drug use- pt denies    Sexual activity: Not on file     Family History   Problem Relation Age of Onset    Arthritis-osteo Other     Stroke Mother     Hypertension Mother        THE  FOR Rena Thomas MD 2021 .       DIAGNOSTIC IMAGING  LAB DATA      No results found for: HBA1C, HGBE8, NBB0JAVJ, GKR2MMUR //   Lab Results   Component Value Date/Time    Glucose 81 03/22/2021 01:14 PM        No results found for: XEF8IRYO, KPO4EVIA      Lab Results   Component Value Date/Time    Vitamin D 25-Hydroxy 43.8 03/22/2021 01:14 PM         REVIEW OF SYSTEMS : 4/19/2021  ALL BELOW ARE Negative except : SEE HPI      12 point review of systems otherwise negative unless noted in HPI. DIAGNOSTIC IMAGING      Please see above section of this report. I have personally reviewed the results of the above study. The interpretation of this study is my professional opinion.       Ten Moody MD  4/19/2021  11:34 AM

## 2021-05-12 ENCOUNTER — OFFICE VISIT (OUTPATIENT)
Dept: ORTHOPEDIC SURGERY | Age: 56
End: 2021-05-12
Payer: MEDICARE

## 2021-05-12 VITALS
WEIGHT: 108 LBS | OXYGEN SATURATION: 100 % | HEART RATE: 65 BPM | HEIGHT: 63 IN | TEMPERATURE: 97 F | BODY MASS INDEX: 19.14 KG/M2

## 2021-05-12 DIAGNOSIS — Z98.890 POST-OPERATIVE STATE: ICD-10-CM

## 2021-05-12 DIAGNOSIS — M20.42 HAMMER TOE OF LEFT FOOT: Primary | ICD-10-CM

## 2021-05-12 PROCEDURE — 73630 X-RAY EXAM OF FOOT: CPT | Performed by: PHYSICIAN ASSISTANT

## 2021-05-12 PROCEDURE — 99024 POSTOP FOLLOW-UP VISIT: CPT | Performed by: PHYSICIAN ASSISTANT

## 2021-05-12 NOTE — PATIENT INSTRUCTIONS
Continue to wear the post op shoe - weight on heel only  Use walker as needed  You can wear the heel cup or orthotic in the shoe for more height  Continue to elevate the left lower extremity  Tubigrip provided for swelling  Follow up in 3 weeks or sooner as needed    If you have a reminder for a \"due or due soon\" health maintenance, please contact your primary care provider for follow-up on this health maintenance. Carey Sales [facility], MPA, PA-C  5/12/2021  3:43 PM

## 2021-05-12 NOTE — PROGRESS NOTES
Leia Mathias (1965) is a 61 y.o. female, established patient, 52 days s/p Left Great Toe Interphalangeal Fusion, Flexor Tenotomy; Left Fourth Toe Revision Interphalangeal Hammer Toe, Proximal Interphalangeal Fusion/c-arm/synthes - Left completed on 3/26/2021, and she is here for evaluation of the following chief complaint(s):    No chief complaint on file. ASSESSMENT & PLAN:     Diagnoses and all orders for this visit:    1. Hammer toe of left foot  -     AMB POC XRAY, FOOT; COMPLETE, 3+ VIEW    2. Post-operative state  -     AMB POC XRAY, FOOT; COMPLETE, 3+ VIEW           ICD-10-CM ICD-9-CM   1. Hammer toe of left foot  M20.42 735.4   2. Post-operative state  Z98.890 V45.89       Patient Instructions                 Continue to wear the post op shoe - weight on heel only  Use walker as needed  You can wear the heel cup or orthotic in the shoe for more height  Continue to elevate the left lower extremity  Tubigrip provided for swelling  Follow up in 3 weeks or sooner as needed    If you have a reminder for a \"due or due soon\" health maintenance, please contact your primary care provider for follow-up on this health maintenance. Carey Bishop [facility], ANAI WADE  5/12/2021  3:43 PM        Follow-up and Dispositions    · Return in about 3 weeks (around 6/2/2021) for follow up evaluation. Dr. Isabella Rudd has been consulted regarding the patient during this visit and he agrees with the assessment and plan    Patient expresses understanding of the diagnosis and treatment plan. Patient education has been provided. Leia Mathias may have a reminder for a \"due or due soon\" health maintenance. I have asked that she contact her primary care provider for follow-up on this health maintenance.  was reviewed by Brooke Stubbs PA-C on 5/12/2021. SUBJECTIVE & OBJECTIVE:     HPI    Since last seen in the office, the patient reports that she is doing well.  She states that she she is doing okay, but her 4th toe is starting to drift down and her great toe is starting to drift laterally. Patient denies any fever, chills, CP, SOB or calf pain. The patient denies any no new illness or injuries to report since last seen in the office. There are no reported changes in medications, allergies, social or family history. Asaf Tomas has been experiencing pain, discomfort and associated symptoms and has tried modalities of treatment and/or self treatment confirmed as outlined in the pain assessment below. Pain Assessment  5/12/2021   Location of Pain Foot   Location Modifiers Left   Severity of Pain 2   Quality of Pain Throbbing;Aching   Quality of Pain Comment -   Duration of Pain Persistent   Frequency of Pain Constant   Frequency of Pain Comment -   Date Pain First Started -   Aggravating Factors Walking;Standing   Limiting Behavior Yes   Relieving Factors Rest   Relieving Factors Comment -   Result of Injury No          Asaf Tomas  has a past medical history of Anemia, Chronic pain, Contact lens/glasses fitting, Frequent UTI (2006), GERD (gastroesophageal reflux disease), H/O eating disorder, Hip fracture, left ([facility] [address] (2000), Hypertension, Leg length discrepancy, Needs pre-anesthesia assessment, Post herpetic neuralgia, PUD (peptic ulcer disease), Rheumatoid arthritis ([facility] [address] Rheumatoid arthritis [facility]), Shingles (April 2014), and Tooth abscess (10/6/15). . Other than previously noted, patient reports no changes in medications, allergies, social or family history. REVIEW OF SYSTEMS:                  All Below are Negative except as indicated in the HPI: See HPI                Constitutional: negative for fever, chills, night sweats and unexpected weight loss and malaise/fatigue              HEENT: Negative. No hoarseness, no double vision              Respiratory: Negative.  No difficulty breathing, No SOB              Cardiovascular: negative for chest pain, claudication, HOFFMANN. (-) Leg swelling               Gastrointestinal: Negative for pain, Blood in stool, incontinence, pelvic pain, N/V/C/D              Genitourinary: No saddle anesthesia, pelvic pain, blood in urine, incontinence, dysuria               Neurological: Negative for dizziness and weakness, visual changes, confusion, headaches, seizures               Phychiatric/Behavioral: Negative for depression, memory loss, substance abuse               Extremities: Negative for hair changes, rash, or skin lesion changes              Hematologic: Negative for bleeding problems, bruising, pallor or swollen lymph nodes              Peripheral Vascular: No calf pain, no circulation deficits              Musculoskeletal: As per HPI above      PHYSICAL EXAM:        Visit Vitals  Pulse 65   Temp 97 °F (36.1 °C)   Ht 5' 3\" (1.6 m)   Wt 108 lb (49 kg)   SpO2 100%   BMI 19.13 kg/m²         Constitutional: [x] Alert, cooperative, appears well-developed and well-nourished [x] No apparent distress      [] Abnormal - Body mass index is 19.13 kg/m². makes the treatment plan more complex     Mental status: [x] Alert and awake  [x] Oriented to person/place/time   [x] Normal mood/behavior/speech   [x] Normal dress/motor activity/thought processes/memory      [x] Able to follow commands    [] Abnormal - Dementia    Eyes:   EOM    [x]  Normal    [] Abnormal -   Sclera  [x]  Normal    [] Abnormal -          Discharge [x]  None visible   [] Abnormal -     HENT: [x] Normocephalic, atraumatic  [] Abnormal -   [x] Mouth/Throat: Mucous membranes are moist    External Ears [x] Normal, hearing intact  [] Abnormal -    Neck: [x] Supple.  No visualized mass, normal ROM [] Abnormal -     Pulmonary/Chest: [x] Respiratory effort normal   [x] No visualized signs of difficulty breathing or respiratory distress        [] Abnormal -        Cardiovascular/    [x] Normal pulses to each foot  [] Abnormal -   Peripheral Vascular:    Neurological:        [x] No Facial Asymmetry (Cranial nerve 7 motor function) (limited exam due to video visit) [x] No gross defects         [x] No gaze palsy        [] Abnormal -          Skin:        [x] No significant exanthematous lesions or discoloration noted on facial skin or visible areas       [] Abnormal -            Psychiatric:       [x] Normal Affect, mood, judgement, behavior, conduct [] Abnormal -        [x] No Hallucinations      Musculoskeletal:   [x] Normal gait with no signs of ataxia         [x] Normal range of motion of neck        [] Abnormal -       MUSCULOSKELETAL: EXAMINATION OF:     ANKLE/FOOT left    INCISION: Incision looks good, skin well healed  SKIN: Mild edema, no erythema, no ecchymosis, no warmth. No rash or skin lesions. No signs of infection or cellulitis present. TENDERNESS:  Mild tenderness to palpation forefoot  NEUROVASCULAR:  Grossly intact. Motor/Sensory: NL/NL. Positive distal pulses and capillary refill. DVT ASSESSMENT:  The calf is not tender to palpation. No evidence of DVT seen on physical exam.  LYMPHATICS: No extremity lymphedema, No calf swelling, no tenderness to calf muscles  JOINT MOTION: Not tested. There is pain-free range of motion of adjacent joints. Negative joint effusion  JOINT/TENDON STABILITY: No Ankle or Subtalar instability or joint laxity. No peroneal sublux ability or dislocation  ALIGNMENT: great toe - slight lateral deviation; fourth toe drifting down;  Midfoot, Hindfoot WNL. An electronic signature was used to authenticate this note. -- Emily Console. Walter Barros [facility], ANAI WADE  5/12/2021      Disclaimer: Sections of this note may have been dictated utilizing voice recognition software, which may have resulted in some phonetic based errors in grammar and contents. Even though attempts were made to correct all the mistakes, some may have been missed, and remained in the body of the document. If questions arise, please contact our department.        RADIOGRAPHS & DIAGNOSTIC STUDIES      Left foot X-rays, 3 views, AP/LAT/OBL completed 5/12/2021 AT [facility]      FINDINGS:    X-rays reveal post op changes s/p Left Great Toe Interphalangeal Fusion, Flexor Tenotomy; Left Fourth Toe Revision Interphalangeal Hammer Toe, Proximal Interphalangeal Fusion/c-arm/synthes - Left. Overall alignment is acceptable. Hardware is in good position with no indication of movement or failure. Soft tissue swelling is mild. Degenerative changes are noted. Mineralization suggests  osteopenia. Calcified vessels are not present. IMPRESSION:    As stated above      I have personally reviewed the results of the above study.  The interpretation of this study is my professional opinion    5/12/2021  Harshad Valentin PA-C

## 2021-06-04 ENCOUNTER — OFFICE VISIT (OUTPATIENT)
Dept: ORTHOPEDIC SURGERY | Age: 56
End: 2021-06-04
Payer: MEDICARE

## 2021-06-04 VITALS — TEMPERATURE: 97 F | HEIGHT: 63 IN | BODY MASS INDEX: 19.14 KG/M2 | WEIGHT: 108 LBS

## 2021-06-04 DIAGNOSIS — L60.8 ACQUIRED DEFORMITY OF NAIL OF FINGER: ICD-10-CM

## 2021-06-04 DIAGNOSIS — M20.032 SWAN-NECK DEFORMITY OF FINGER, LEFT: Primary | ICD-10-CM

## 2021-06-04 PROCEDURE — G8420 CALC BMI NORM PARAMETERS: HCPCS | Performed by: ORTHOPAEDIC SURGERY

## 2021-06-04 PROCEDURE — 3017F COLORECTAL CA SCREEN DOC REV: CPT | Performed by: ORTHOPAEDIC SURGERY

## 2021-06-04 PROCEDURE — G8427 DOCREV CUR MEDS BY ELIG CLIN: HCPCS | Performed by: ORTHOPAEDIC SURGERY

## 2021-06-04 PROCEDURE — G8432 DEP SCR NOT DOC, RNG: HCPCS | Performed by: ORTHOPAEDIC SURGERY

## 2021-06-04 PROCEDURE — 99213 OFFICE O/P EST LOW 20 MIN: CPT | Performed by: ORTHOPAEDIC SURGERY

## 2021-06-04 NOTE — PROGRESS NOTES
Caro Sherman is a 61 y.o. female right handed unspecified employment. Worker's Compensation and legal considerations: none filed. Vitals:    06/04/21 1152   Temp: 97 °F (36.1 °C)   TempSrc: Skin   Weight: 108 lb (49 kg)   Height: 5' 3\" (1.6 m)   PainSc:   1   PainLoc: Finger           Chief Complaint   Patient presents with    Finger Pain     left pinky       HPI: Patient presents today with complaints of popping of her left little finger. She notices that it hyperextends and then she is stuck with it bent at the tip. Additionally her left index finger nailbed has become more more deformed. Initial HPI: Patient presents today with complaints of left little finger pain 2 months after spraining it. She also reports chronic symptoms about her eponychial him on her index finger proximal to the nail on the left as well as similar symptoms on the right. She has seen a dermatologist twice for this in the past but did not follow-up. She also has a history of Raynaud's.     Date of onset: December 2020    Injury: Yes: Comment: Fall    Prior Treatment:  Yes: Comment: Urgent care for left little fingerAnd antibiotics for left index finger    Numbness/ Tingling: No      ROS: Review of Systems - General ROS: negative  Psychological ROS: negative  ENT ROS: negative  Allergy and Immunology ROS: negative  Hematological and Lymphatic ROS: negative  Respiratory ROS: no cough, shortness of breath, or wheezing  Cardiovascular ROS: no chest pain or dyspnea on exertion  Gastrointestinal ROS: no abdominal pain, change in bowel habits, or black or bloody stools  Musculoskeletal ROS: positive for - pain in finger - bilateral  Neurological ROS: negative  Dermatological ROS: negative    Past Medical History:   Diagnosis Date    Anemia     Chronic pain     Contact lens/glasses fitting     Finger pain, left     Frequent UTI 2006    GERD (gastroesophageal reflux disease)     H/O eating disorder     remote    Hip fracture, left ([facility] [address] 2000    Hypertension     Leg length discrepancy     Needs pre-anesthesia assessment     Patient reports a history of high tolerance to pain medicine and prior hisoty of drug use    Post herpetic neuralgia     PUD (peptic ulcer disease)     Rheumatoid arthritis ([facility] [address]     Rheumatoid arthritis ([facility] [address]     Shingles April 2014    Tooth abscess 10/6/15    Completed Amoxicillin        Past Surgical History:   Procedure Laterality Date    FOOT/TOES SURGERY PROC UNLISTED      HX COLONOSCOPY      HX GYN  2012    fibroids    HX ORTHOPAEDIC Left 2012    partial hip replacement    HX ORTHOPAEDIC  2009    attempt bunionectomy    HX ORTHOPAEDIC  10/2015    Dr. De La Fuente [place]: Eduinana Johnson HX ORTHOPAEDIC  2014    redo left hip replacement    NEUROLOGICAL PROCEDURE UNLISTED Bilateral 2016    Thalamotomy        Current Outpatient Medications   Medication Sig Dispense Refill    oxyCODONE IR (ROXICODONE) 5 mg immediate release tablet Take 5 mg by mouth every six (6) hours as needed for Pain.  rivaroxaban (Xarelto) 10 mg tablet TAKE ONE TABLET BY MOUTH PER DAY FOLLOWING SURGERY  Indications: treatment to prevent recurrence of a clot in a deep vein 30 Tab 0    polyethylene glycol (Miralax) 17 gram packet Take 1 Packet by mouth daily. 10 Packet 1    levETIRAcetam (Keppra) 1,000 mg tablet Take 1,500 mg by mouth daily. Indications: Neuralgia      gabapentin (NEURONTIN) 600 mg tablet Take 600 mg by mouth three (3) times daily.  methotrexate (RHEUMATREX) 2.5 mg tablet Take 15 mg by mouth every Sunday.  folic acid (FOLVITE) 1 mg tablet Take  by mouth daily.  hydrOXYchloroQUINE (PLAQUENIL) 200 mg tablet Take 200 mg by mouth two (2) times a day.  azaTHIOprine (Imuran) 50 mg tablet Take 50 mg by mouth three (3) times daily.       HYDROmorphone (DILAUDID) 2 mg tablet Take one tablet PO Q 6 HRS as needed for **POST OPERATIVE BREAKTHROUGH PAIN** 50 Tab 0    prochlorperazine (COMPAZINE) 10 mg tablet Take 5 mg by mouth every six (6) hours as needed.  polyethylene glycol (MIRALAX) 17 gram/dose powder Take 17 g by mouth daily. 255 g 1    dextroamphetamine-amphetamine (ADDERALL) 10 mg tablet Take 10 mg by mouth two (2) times a day.  0.9% sodium chloride solution       medroxyPROGESTERone (PROVERA) 5 mg tablet Take 5 mg by mouth daily.  imipramine (TOFRANIL) 25 mg tablet Take 300 mg by mouth nightly.  polyethylene glycol (MIRALAX) 17 gram/dose powder Take 17 g by mouth daily. 255 g 1    CALCIUM CARBONATE/VITAMIN D3 (CALCIUM+D PO) Take  by mouth.  multivitamin (ONE A DAY) tablet Take 1 Tab by mouth daily.  estradiol (ESTRACE) 0.5 mg tablet Take  by mouth.  dronabinol (MARINOL) 5 mg capsule Take 5 mg by mouth two (2) times daily as needed. Indications: HEADACHE      carvedilol (COREG) 6.25 mg tablet Take 6.25 mg by mouth two (2) times a day.  alendronate (FOSAMAX) 70 mg tablet Take  by mouth every Sunday.  venlafaxine-SR (EFFEXOR XR) 150 mg capsule Take  by mouth daily.  baclofen (LIORESAL) 10 mg tablet Take 20 mg by mouth four (4) times daily.  pantoprazole (PROTONIX) 40 mg tablet Take 40 mg by mouth daily. Twice a day      predniSONE (DELTASONE) 10 mg tablet Take  by mouth daily (with breakfast).  HYDROmorphone (DILAUDID) 2 mg tablet TAKE ONE TABLET BY MOUTH Q 6 HRS PRN for POST OPERATIVE BREAKTHROUGH PAIN  **DO NOT FILL BEFORE 1/10/17**  Indications: PAIN (Patient not taking: Reported on 6/4/2021) 50 Tab 0    promethazine (PHENERGAN) 25 mg tablet Take 1 Tab by mouth every six (6) hours as needed for Nausea. (Patient not taking: Reported on 6/4/2021) 30 Tab 0    biotin 10,000 mcg cap Take  by mouth. (Patient not taking: Reported on 6/4/2021)      VITAMIN B COMPLEX PO Take  by mouth. (Patient not taking: Reported on 6/4/2021)      lamoTRIgine (LAMICTAL) 100 mg tablet Take  by mouth two (2) times a day.  (Patient not taking: Reported on 2021)      ondansetron hcl (ZOFRAN, AS HYDROCHLORIDE,) 8 mg tablet Take 8 mg by mouth every eight (8) hours as needed for Nausea. (Patient not taking: Reported on 2021)      methylphenidate (RITALIN) 10 mg tablet Take  by mouth three (3) times daily. (Patient not taking: Reported on 2021)         Allergies   Allergen Reactions    Aspirin Unknown (comments)     Severe stomach pain and bleeding    Lyrica [Pregabalin] Other (comments)     Slurred speech    Nsaids (Non-Steroidal Anti-Inflammatory Drug) Other (comments)     Hx  Of bleeding ulcers    Sulfa (Sulfonamide Antibiotics) Rash    Tetracycline Hives           PE:     Physical Exam  Vitals and nursing note reviewed. Constitutional:       General: She is not in acute distress. Appearance: Normal appearance. She is not ill-appearing. Eyes:      Pupils: Pupils are equal, round, and reactive to light. Cardiovascular:      Pulses: Normal pulses. Pulmonary:      Effort: Pulmonary effort is normal. No respiratory distress. Abdominal:      General: Abdomen is flat. There is no distension. Musculoskeletal:         General: Swelling, tenderness and deformity present. No signs of injury. Cervical back: Normal range of motion and neck supple. Right lower leg: No edema. Left lower leg: No edema. Skin:     General: Skin is warm and dry. Capillary Refill: Capillary refill takes less than 2 seconds. Findings: No bruising or erythema. Neurological:      General: No focal deficit present. Mental Status: She is alert and oriented to person, place, and time. Cranial Nerves: No cranial nerve deficit. Sensory: No sensory deficit. Psychiatric:         Mood and Affect: Mood normal.         Behavior: Behavior normal.            Left hand: There is a swan-neck deformity noted about the left small finger. There is also deformity noted about the index finger nailbed.       Imagin/1/2021 films of bilateral hands does not show any fracture or dislocation. There is an old healed fracture of the fifth metacarpal on the left side. Additionally there is noted soft tissue edema about the PIP joint of the left small finger. ICD-10-CM ICD-9-CM    1. [place]-neck deformity of finger, left  M20.032 736.22 REFERRAL TO OCCUPATIONAL THERAPY   2. Acquired deformity of nail of finger  L60.8 703.8          Plan:     Discussed operative versus nonoperative treatment for the nailbed deformity. She can continue the topical medication she has been using a week and remove the nail bed. She was opted to wait on removing the nail plate. Additionally we will send her to therapy for a splint for swan-neck deformity. Follow-up and Dispositions    · Return if symptoms worsen or fail to improve.           Plan was reviewed with patient, who verbalized agreement and understanding of the plan

## 2021-06-07 ENCOUNTER — OFFICE VISIT (OUTPATIENT)
Dept: ORTHOPEDIC SURGERY | Age: 56
End: 2021-06-07
Payer: MEDICARE

## 2021-06-07 VITALS — HEIGHT: 63 IN | BODY MASS INDEX: 19.13 KG/M2 | RESPIRATION RATE: 15 BRPM

## 2021-06-07 DIAGNOSIS — Z98.890 POST-OPERATIVE STATE: Primary | ICD-10-CM

## 2021-06-07 DIAGNOSIS — M20.5X2 ACQUIRED MALLET TOE OF LEFT FOOT: ICD-10-CM

## 2021-06-07 DIAGNOSIS — M20.42 HAMMER TOE OF LEFT FOOT: ICD-10-CM

## 2021-06-07 DIAGNOSIS — M19.172 POST-TRAUMATIC OSTEOARTHRITIS OF LEFT FOOT: ICD-10-CM

## 2021-06-07 PROCEDURE — 73630 X-RAY EXAM OF FOOT: CPT | Performed by: ORTHOPAEDIC SURGERY

## 2021-06-07 PROCEDURE — 99024 POSTOP FOLLOW-UP VISIT: CPT | Performed by: ORTHOPAEDIC SURGERY

## 2021-06-07 NOTE — PROGRESS NOTES
AMBULATORY PROGRESS NOTE      Patient: Maryuri Helms             MRN: [id number]     SSN: [id number] Body mass index is 19.13 kg/m². YOB: 1965     AGE: 61 y.o. EX: female    PCP: Mayco Yang MD       IMPRESSION //  DIAGNOSIS AND TREATMENT PLAN        Maryuri Helms has a diagnosis of:      DIAGNOSES    1. Post-operative state    2. Hammer toe of left foot    3. Acquired mallet toe of left foot    4. Post-traumatic osteoarthritis of left foot        Orders Placed This Encounter    AMB POC XRAY, FOOT; COMPLETE, 3+ VIEW     ASK ALL FEMALE PATIENTS IF PREGNANT     Order Specific Question:   Reason for Exam     Answer:   PAIN        PLAN:    1. I will obtain a 3-view x-ray of left foot in the office today. 2. I will have pt gradually wean out of left hard sole shoe as tolerated and into her New Balance shoe. 3. I will advise pt to ambulate as tolerated. RTO-  5 weeks    Maryuri Helms  expresses understanding of the diagnosis, treatment plan, and all of their proposed questions were answered to their satisfaction. Patient education has been provided re the diagnoses. HPI //  OBJECTIVE EXAMINATION        Maryuri Helms IS A 61 y.o. female who is a/an  established patient, presenting to my outpatient office for evaluation of  the following chief complaint(s):     Chief Complaint   Patient presents with    Foot Pain     Left foot     Patient was last evaluated by Donell Monson, my PA, for hammer toe of left foot and was 47 days s/p Left Great Toe Interphalangeal Fusion, Flexor Tenotomy; Left Fourth Toe Revision Interphalangeal Hammer Toe, Proximal Interphalangeal Fusion/c-arm/synthes - Left completed on 3/26/2021. At today's OV patient states her left #4 toe is drifting back accompanied with no pain. She presents today with a left hard sole shoe.        Visit Vitals  Resp 15   Ht 5' 3\" (1.6 m)   BMI 19.13 kg/m²       Appearance: Alert, well appearing and pleasant patient who is in no distress, oriented to person, place/time, and who follows commands. This patient is accompanied in the examination room by her  self. There is signs of: no dementia  Psychiatric: Affect/mood are appropriate. Speech normal in context and clarity, memory intact grossly, no involuntary movements - tremors. Patient arrives to office via: with assistive device: Left hard sole shoe  H EENT (2): Head normocephalic & atraumatic. Eye: pupils are round// EOM are intact // Neck: ROM WNL  // Hearings Intact   Respiratory: Breathing non labored     ANKLE/FOOT left    Gait: uses assistive device   Tenderness: no  At this time   Cutaneous:  Less swelling to great toe IP region compared to pre op/immediate postop period  Joint Motion:  FOREFOOT/HF ROM LIMITED DUE TO PRIOR SURGERIES  Joint / Tendon Stability: No Ankle or Subtalar instability or joint laxity. No peroneal sublux ability or dislocation  Alignment: neutral Hindfoot,    Neuro Motor/Sensory: NL/NL  Vascular: NL foot/ankle pulses,   Lymphatics: No extremity lymphedema, No calf swelling, no tenderness to calf muscles. CHART REVIEW      Date of Procedure: 3/26/2021      Pre-Op Diagnosis: M20.42 HAMMER TOE OF LEFT FOOT  M20.5X2 ACQUIRED MALLET TOE OF LEFT FOOT  M19.172 POST-TRAUMATIC OSTEOARTHRITIS OF LEFT FOOT  LEFT 5TH TOE FLEXION DEFORMITY     Post-Op Diagnosis: Same as preoperative diagnosis.       Procedure(s):  LEFT GREAT TOE INTERPHALANGEAL FUSION  OPEN GREAT TOE 14851  OPEN GREAT TOE FLEXOR TENOTOMY 98955  LEFT GREAT TOE IP SYNOVECTOMY: 28362  LEFT FOURTH TOE REVISION INTERPHALANGEAL HAMMER TOE PROXIMAL INTERPHALANGEAL FUSION 52325  OPEN FIFTH TOE FLEXOR TENOTOMY  29521  C-ARM/SYNTHES     Toritojenniferjeff Helms has been experiencing pain and discomfort confirmed as outlined in the pain assessment outlined below.  was reviewed by Azael Graham MD on 6/7/2021.      Pain Assessment  6/7/2021   Location of Pain Foot Pain Location Comment -   Location Modifiers Left   Severity of Pain 0   Quality of Pain Other (Comment)   Quality of Pain Comment swelling   Duration of Pain -   Frequency of Pain -   Frequency of Pain Comment -   Date Pain First Started -   Aggravating Factors -   Aggravating Factors Comment -   Limiting Behavior -   Relieving Factors -   Relieving Factors Comment -   Result of Injury -        Isacc Simpson  has a past medical history of Anemia, Chronic pain, Contact lens/glasses fitting, Finger pain, left, Frequent UTI (2006), GERD (gastroesophageal reflux disease), H/O eating disorder, Hip fracture, left ([address] (2000), Hypertension, Leg length discrepancy, Needs pre-anesthesia assessment, Post herpetic neuralgia, PUD (peptic ulcer disease), Rheumatoid arthritis ([address] Rheumatoid arthritis [facility]), Shingles (April 2014), and Tooth abscess (10/6/15).      Patients is employed at:         Past Medical History:   Diagnosis Date    Anemia     Chronic pain     Contact lens/glasses fitting     Finger pain, left     Frequent UTI 2006    GERD (gastroesophageal reflux disease)     H/O eating disorder     remote    Hip fracture, left ([address] 2000    Hypertension     Leg length discrepancy     Needs pre-anesthesia assessment     Patient reports a history of high tolerance to pain medicine and prior hisoty of drug use    Post herpetic neuralgia     PUD (peptic ulcer disease)     Rheumatoid arthritis ([address]     Rheumatoid arthritis ([address]     Shingles April 2014    Tooth abscess 10/6/15    Completed Amoxicillin      Past Surgical History:   Procedure Laterality Date    FOOT/TOES SURGERY PROC UNLISTED      HX COLONOSCOPY      HX GYN  2012    fibroids    HX ORTHOPAEDIC Left 2012    partial hip replacement    HX ORTHOPAEDIC  2009    attempt bunionectomy    HX ORTHOPAEDIC  10/2015    Dr. Kandee Barthel: First Metatarsophylangeal Fusion,etc    HX ORTHOPAEDIC  2014    redo left hip replacement    NEUROLOGICAL PROCEDURE UNLISTED Bilateral 2016    Thalamotomy      Current Outpatient Medications   Medication Sig    oxyCODONE IR (ROXICODONE) 5 mg immediate release tablet Take 5 mg by mouth every six (6) hours as needed for Pain.  rivaroxaban (Xarelto) 10 mg tablet TAKE ONE TABLET BY MOUTH PER DAY FOLLOWING SURGERY  Indications: treatment to prevent recurrence of a clot in a deep vein    polyethylene glycol (Miralax) 17 gram packet Take 1 Packet by mouth daily.  levETIRAcetam (Keppra) 1,000 mg tablet Take 1,500 mg by mouth daily. Indications: Neuralgia    gabapentin (NEURONTIN) 600 mg tablet Take 600 mg by mouth three (3) times daily.  methotrexate (RHEUMATREX) 2.5 mg tablet Take 15 mg by mouth every Sunday.  folic acid (FOLVITE) 1 mg tablet Take  by mouth daily.  hydrOXYchloroQUINE (PLAQUENIL) 200 mg tablet Take 200 mg by mouth two (2) times a day.  azaTHIOprine (Imuran) 50 mg tablet Take 50 mg by mouth three (3) times daily.  HYDROmorphone (DILAUDID) 2 mg tablet Take one tablet PO Q 6 HRS as needed for **POST OPERATIVE BREAKTHROUGH PAIN**    HYDROmorphone (DILAUDID) 2 mg tablet TAKE ONE TABLET BY MOUTH Q 6 HRS PRN for POST OPERATIVE BREAKTHROUGH PAIN  **DO NOT FILL BEFORE 1/10/17**  Indications: PAIN    prochlorperazine (COMPAZINE) 10 mg tablet Take 5 mg by mouth every six (6) hours as needed.  polyethylene glycol (MIRALAX) 17 gram/dose powder Take 17 g by mouth daily.  dextroamphetamine-amphetamine (ADDERALL) 10 mg tablet Take 10 mg by mouth two (2) times a day.  0.9% sodium chloride solution     medroxyPROGESTERone (PROVERA) 5 mg tablet Take 5 mg by mouth daily.  imipramine (TOFRANIL) 25 mg tablet Take 300 mg by mouth nightly.  promethazine (PHENERGAN) 25 mg tablet Take 1 Tab by mouth every six (6) hours as needed for Nausea.  polyethylene glycol (MIRALAX) 17 gram/dose powder Take 17 g by mouth daily.  CALCIUM CARBONATE/VITAMIN D3 (CALCIUM+D PO) Take  by mouth.     VITAMIN B COMPLEX PO Take  by mouth.  multivitamin (ONE A DAY) tablet Take 1 Tab by mouth daily.  estradiol (ESTRACE) 0.5 mg tablet Take  by mouth.  lamoTRIgine (LAMICTAL) 100 mg tablet Take  by mouth two (2) times a day.  dronabinol (MARINOL) 5 mg capsule Take 5 mg by mouth two (2) times daily as needed. Indications: HEADACHE    carvedilol (COREG) 6.25 mg tablet Take 6.25 mg by mouth two (2) times a day.  ondansetron hcl (ZOFRAN, AS HYDROCHLORIDE,) 8 mg tablet Take 8 mg by mouth every eight (8) hours as needed for Nausea.  alendronate (FOSAMAX) 70 mg tablet Take  by mouth every .  methylphenidate (RITALIN) 10 mg tablet Take  by mouth three (3) times daily.  venlafaxine-SR (EFFEXOR XR) 150 mg capsule Take  by mouth daily.  baclofen (LIORESAL) 10 mg tablet Take 20 mg by mouth four (4) times daily.  pantoprazole (PROTONIX) 40 mg tablet Take 40 mg by mouth daily. Twice a day    predniSONE (DELTASONE) 10 mg tablet Take  by mouth daily (with breakfast).  biotin 10,000 mcg cap Take  by mouth. (Patient not taking: Reported on 2021)     No current facility-administered medications for this visit.      Allergies   Allergen Reactions    Aspirin Unknown (comments)     Severe stomach pain and bleeding    Lyrica [Pregabalin] Other (comments)     Slurred speech    Nsaids (Non-Steroidal Anti-Inflammatory Drug) Other (comments)     Hx  Of bleeding ulcers    Sulfa (Sulfonamide Antibiotics) Rash    Tetracycline Hives     Social History     Occupational History    Not on file   Tobacco Use    Smoking status: Former Smoker     Quit date: 2015     Years since quittin.8    Smokeless tobacco: Never Used   Vaping Use    Vaping Use: Never used   Substance and Sexual Activity    Alcohol use: No    Drug use: No     Comment: Prior history of drug use- pt denies    Sexual activity: Not on file     Family History   Problem Relation Age of Onset    Arthritis-osteo Other     Stroke Mother    Collette Satchel Hypertension Mother         DIAGNOSTIC LAB DATA      No results found for: HBA1C, XXA8RGVW, DXL0OOLZ //   Lab Results   Component Value Date/Time    Glucose 81 03/22/2021 01:14 PM        No results found for: HJR9SHNR, JVU2ZPOH      Lab Results   Component Value Date/Time    Vitamin D 25-Hydroxy 43.8 03/22/2021 01:14 PM         REVIEW OF SYSTEMS : 6/7/2021  ALL BELOW ARE Negative except : SEE HPI     All other systems reviewed and are negative. 12 point review of systems otherwise negative unless noted in HPI. DIAGNOSTIC IMAGING /ORDERS      SEE OTHER NOTE FROM 6/7/2021       I have reviewed the results of the above study. The interpretation of this study is my professional opinion. An electronic signature was used to authenticate this note. Disclaimer: Sections of this note are dictated using utilizing voice recognition software, which may have resulted in some phonetic based errors in grammar and contents. Even though attempts were made to correct all the mistakes, some may have been missed, and remained in the body of the document. If questions arise, please contact our department. Bryce Simental may have a reminder for a \"due or due soon\" health maintenance. I have asked that she contact her primary care provider for follow-up on this health maintenance. Socorro Le, as dictated by Penelope Sandra MD  6/7/2021  7:37 AM

## 2021-06-07 NOTE — PROGRESS NOTES
DIAGNOSTIC IMAGING         FOOT X RAYS 3 VIEWS LEFT  6/7/2021    WEIGHT BEARING    X RAYS AT [facility] [address] [place] [address]  6/7/2021      Bones: No fractures or dislocations. No focal osteolytic or osteoblastic process     Bone Spurs: No significant bone spurs // LEFT MTP FUSION WITH DORSAL DERAS/INTER FRAG SCREW   LEFT GREAT TOE IP FUSION IM SCREW/ FUSION SITE IS INCOMPLETE  LEFT 4TH TOE DIP/PIP FUSION SCREW IM IS IN PLACE    Foot Alignment: WNL  Joint Condition: No Significant OA  Soft Tissues: Normal, No radiopaque foreign body and No abnormal calcific densities to soft tissues   No ankle joint effusion in lateral projection.   Mineralization: Suggests  no Osteopenia    I have personally reviewed the results of the above study and the interpretation of this study is my professional opinion

## 2021-06-22 ENCOUNTER — HOSPITAL ENCOUNTER (OUTPATIENT)
Dept: PHYSICAL THERAPY | Age: 56
Discharge: HOME | End: 2021-06-22

## 2021-06-22 NOTE — PROGRESS NOTES
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  
 [] Estim:  []Unatt       []IFC  []Premod []Other:  []w/ice   []w/heat Position: Location:  
 [] Estim: []Att    []TENS instruct  []NMES []Other:  []w/US   []w/ice   []w/heat Position: Location:  
 []  Traction: [] Cervical       []Lumbar 
                     [] Prone          []Supine []Intermittent   []Continuous Lbs: 
[] before manual 
[] after manual  
 []  Ultrasound: []Continuous   [] Pulsed []1MHz   []3MHz W/cm2: 
Location:  
 []  Iontophoresis with dexamethasone Location: [] Take home patch  
[] In clinic  
 []  Ice     []  heat 
[]  Ice massage 
[]  Laser  
[]  Anodyne Position: Location:  
 []  Laser with stim 
[]  Other:  Position: Location:  
 []  Vasopneumatic Device []  Right     []  Left Pre-treatment girth: 
Post-treatment girth: 
Measured at (location):  Pressure:       [] lo [] med [] hi  
Temperature: [] lo [] med [] hi  
 
 
[] Skin assessment post-treatment:  []intact []redness- no adverse reaction 
  []redness  adverse reaction:  
 
*** min []Eval                  []Re-Eval  
 
 
*** min Therapeutic Exercise:  [] See flow sheet :  
Rationale: {BSHSI IMMOTION THER EX:69019} to improve the patients ability to *** 
 
*** min Therapeutic Activity:  []  See flow sheet :  
Rationale: {BSHSI IMMOTION THER EX:16412}  to improve the patients ability to *** 
  
*** min Neuromuscular Re-education:  []  See flow sheet :  
Rationale: {BSHSI IMMOTION THER EX:89219}  to improve the patients ability to *** 
 
*** min Manual Therapy:  *** The manual therapy interventions were performed at a separate and distinct time from the therapeutic activities interventions. Rationale: {BSHSI IMMOTION MANUAL THERAPY:07127} to *** 
 
 min Orthotic/Splinting: *** Rationale: {BSHSI IMMOTION THER EX:28431}  to improve the patients ability to *** 
 
*** min Self Care/Home Management: *** Rationale: {BSHSI IMMOTION THER EX:94166}  to improve the patients ability to *** With 
 [] TE 
 [] TA 
 [] neuro 
 [] other: Patient Education: [x] Review HEP [] Progressed/Changed HEP based on:  
[] positioning   [] body mechanics   [] transfers   [] heat/ice application  
[] Splint wear/care   [] Sensory re-education   [] scar management     
[] other:   
 
     
Other Objective/Functional Measures: *** Pain Level (0-10 scale) post treatment: *** ASSESSMENT/Changes in Function: *** Patient will continue to benefit from skilled OT services to {[facility] INMOTION ASSESSMENT STATEMENTS:20159} to attain remaining goals. []  See Plan of Care 
[]  See progress note/recertification 
[]  See Discharge Summary Progress towards goals / Updated goals: 
*** PLAN 
[]  Upgrade activities as tolerated     []  Continue plan of care 
[]  Update interventions per flow sheet      
[]  Discharge due to:_ 
[]  Other:_ BELÉN Barnes/L 6/22/2021  9:58 AM 
 
Future Appointments Date Time Provider Lacie Webster 6/22/2021  3:45 PM Pauline Barros M, [facility] [address]

## 2021-07-01 ENCOUNTER — APPOINTMENT (OUTPATIENT)
Dept: PHYSICAL THERAPY | Age: 56
End: 2021-07-01
Payer: MEDICARE

## 2021-07-14 ENCOUNTER — HOSPITAL ENCOUNTER (OUTPATIENT)
Dept: PHYSICAL THERAPY | Age: 56
Discharge: HOME | End: 2021-07-14
Payer: MEDICARE

## 2021-07-14 PROCEDURE — L3933 FO W/O JOINTS CF: HCPCS

## 2021-07-14 NOTE — PROGRESS NOTES
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 female being referred to therapy for fabrication of orthosis to prevent swan neck deformity. Patient Goals:  Keep finger from popping and locking up    Mechanism of Injury: Fall    Orthotic Goals: Prevent long term deformity    Orthotic Plan: Wear at all times  Issued oval 8 for day wear, custom orthosis for night wear    Type of Orthotic(s) issued: Oval 8, custom gutter orthosis with slight pip flexion and DIP free    L Code:        ASSESSMENT/RECOMMENDATIONS: Patients splint appears to be fitting well and patient is able to vebalize understanding of wear, care and precautions. [x]Discontinue therapy: [x]Patient has reached or is progressing toward set goals      []Patient is non-compliant or has abdicated      []Due to lack of appreciable progress towards set goals        Therapist Goals: To be achieved 2021.   Patient/Caregiver instructed in orthotic wear, care, and precautions GOAL MET ON ABOVE DATE  Patient will demonstrate a good understanding of their condition and strategies for self-management. Written orthotic instructions given GOAL MET ON ABOVE DATE  Patient demonstrated independent donning and doffing of orthotic(s) GOAL MET ON ABOVE DATE      Plan of Care:  Patient will contact the [facility] [address] at the number provided if any complications arise for orthotic revision. Thank you for the referral. Please do not hesitate to contact me with any questions or concerns. Juan Pablo Mahajan OTR/ASHLEY 7/14/2021 2:20 PM    ________________________________________________________________________    I certify that the above Therapy Services are being furnished while the patient is under my care. I agree with the treatment plan and certify that this therapy is necessary.     Physician's Signature:____________________  Date:____________Time:__________    Please sign and return to [facility]    [address] [place] [address], [address] [place] [address] Drive

## 2021-07-19 ENCOUNTER — OFFICE VISIT (OUTPATIENT)
Dept: ORTHOPEDIC SURGERY | Age: 56
End: 2021-07-19
Payer: MEDICARE

## 2021-07-19 ENCOUNTER — TELEPHONE (OUTPATIENT)
Dept: ORTHOPEDIC SURGERY | Age: 56
End: 2021-07-19

## 2021-07-19 VITALS — TEMPERATURE: 97.2 F | BODY MASS INDEX: 19.13 KG/M2 | WEIGHT: 108 LBS

## 2021-07-19 DIAGNOSIS — M79.89 TOE SWELLING: ICD-10-CM

## 2021-07-19 DIAGNOSIS — Z98.890 POST-OPERATIVE STATE: Primary | ICD-10-CM

## 2021-07-19 PROCEDURE — G8432 DEP SCR NOT DOC, RNG: HCPCS | Performed by: ORTHOPAEDIC SURGERY

## 2021-07-19 PROCEDURE — 99213 OFFICE O/P EST LOW 20 MIN: CPT | Performed by: ORTHOPAEDIC SURGERY

## 2021-07-19 PROCEDURE — G8420 CALC BMI NORM PARAMETERS: HCPCS | Performed by: ORTHOPAEDIC SURGERY

## 2021-07-19 PROCEDURE — 3017F COLORECTAL CA SCREEN DOC REV: CPT | Performed by: ORTHOPAEDIC SURGERY

## 2021-07-19 PROCEDURE — G8427 DOCREV CUR MEDS BY ELIG CLIN: HCPCS | Performed by: ORTHOPAEDIC SURGERY

## 2021-07-19 RX ORDER — AMOXICILLIN AND CLAVULANATE POTASSIUM 500; 125 MG/1; MG/1
1 TABLET, FILM COATED ORAL 2 TIMES DAILY
Qty: 20 TABLET | Refills: 0 | Status: SHIPPED | OUTPATIENT
Start: 2021-07-19 | End: 2022-01-07

## 2021-07-19 NOTE — TELEPHONE ENCOUNTER
Patient returned call and was advised of below, she actually wanted to have order sent to [facility] in [address] [place] [address] Street. Please forward to them for scheduling.

## 2021-07-19 NOTE — TELEPHONE ENCOUNTER
Patient called again stating she would like to complete her CT at [facility] or [facility] instead of going to [address]    Please advise

## 2021-07-19 NOTE — TELEPHONE ENCOUNTER
Dr Yvonne Erwin ordered a STAT Ct of the Left Foot without contrast and released the patient after her office visit. I attempted to reach Ms Roderick Adjutant but reached an unidentified voice mail so I left a generic message. [facility] does not accept Terry Wright with prefix [employer] so she will need to schedule with [address] [place] [address]. I left the number for central scheduling, [phone].

## 2021-07-19 NOTE — PROGRESS NOTES
AMBULATORY PROGRESS NOTE      Patient: Carlos Dickson             MRN: [id number]     SSN: [id number] Body mass index is 19.13 kg/m². YOB: 1965     AGE: 61 y.o. EX: female    PCP: Shahzad Hernandez MD       IMPRESSION //  DIAGNOSIS AND TREATMENT PLAN        Carlos Dickson has a diagnosis of: My impression, she is a left great toe IP arthrodesis, bilateral think arthrodesis is solid, does appear her left great toe IP region, is slightly more valgus, Indicating that this bone may not be fully fused: Sam Villalpando is a CT scan of his left forefoot. Next also recommend blood work, stat CBC with differential ESR CRP check her inflammatory markers. She does have inflammatory arthropathy: I believe rheumatoid arthritis, for she takes methotrexate. Next if her markers are high and she will look like she is got an infection, she require I&D, may require removal of the intramedullary screw removal of hardware, revision great toe IP arthrodesis. She denied a fever shakes chills night sweats, denies any change in her appetite. She has swelling, to the left great toe first IP joint region. It does appear left great toe, some more valgus alignment at the IP region. She had a IP joint arthrodesis          Date of Procedure: 3/26/2021      Pre-Op Diagnosis: M20.42 HAMMER TOE OF LEFT FOOT  M20.5X2 ACQUIRED MALLET TOE OF LEFT FOOT  M19.172 POST-TRAUMATIC OSTEOARTHRITIS OF LEFT FOOT  LEFT 5TH TOE FLEXION DEFORMITY     Post-Op Diagnosis: Same as preoperative diagnosis.       Procedure(s):  LEFT GREAT TOE INTERPHALANGEAL FUSION  OPEN GREAT TOE 81161  OPEN GREAT TOE FLEXOR TENOTOMY 75156  LEFT GREAT TOE IP SYNOVECTOMY: 60025  LEFT FOURTH TOE REVISION INTERPHALANGEAL HAMMER TOE PROXIMAL INTERPHALANGEAL FUSION 59118  OPEN FIFTH TOE FLEXOR TENOTOMY  56015  C-ARM/SYNTHES             DIAGNOSES    1. Post-operative state    2.  Toe swelling        Orders Placed This Encounter    CT FOOT LT WO CONT Standing Status:   Future     Standing Expiration Date:   8/19/2021     Order Specific Question:   Region of foot     Answer: Fore    CBC WITH AUTOMATED DIFF     Standing Status:   Future     Standing Expiration Date:   8/19/2021    SED RATE (ESR)     Standing Status:   Future     Standing Expiration Date:   8/19/2021    C REACTIVE PROTEIN, QT     Standing Status:   Future     Standing Expiration Date:   8/19/2021    amoxicillin-clavulanate (AUGMENTIN) 500-125 mg per tablet     Sig: Take 1 Tablet by mouth two (2) times a day. Dispense:  20 Tablet     Refill:  0        PLAN:    1. Order blood work :CBC w/ diff , ESR, and  CRP  2. CT of left forefoot  3. Order Augmentin 500mg BID    RTO-  F/u after CT    Vanessa Rose  expresses understanding of the diagnosis, treatment plan, and all of their proposed questions were answered to their satisfaction. Patient education has been provided re the diagnoses. HPI //  [address] [facility] Rula IS A 61 y.o. female who is a/an  established patient, presenting to my outpatient office for evaluation of  the following chief complaint(s):     Chief Complaint   Patient presents with    Foot Pain     left     At [address] patient presented for post-op of left great interphalangeal fusion, flexor tenotomy; left fourth toe revision interphalangeal hammer toe, proximal interphalangeal fusion/c-arm/Synthes-left on March 26, 2021. Obtained 3-view x-ray of left foot in the office. Advised pt to gradually wean out of left hard soled shoe as tolerated. Advised pt to ambulate as tolerated. Since LOV patient endorses severe left great toe pain. She states she's allergic to certain medications. Patient notes she has been seeing pain management who's recommending a shot in her back. Denies any fever , shakes, or chills.       Visit Vitals  Temp 97.2 °F (36.2 °C)   Wt 108 lb (49 kg)   BMI 19.13 kg/m²       Appearance: Alert, well appearing and pleasant patient who is in no distress, oriented to person, place/time, and who follows commands. This patient is accompanied in the examination room by her  friend. There is signs of: no dementia  Psychiatric: Affect/mood are appropriate. Speech normal in context and clarity, memory intact grossly, no involuntary movements - tremors. Patient arrives to office via: without assistive device:   H EENT (2): Head normocephalic & atraumatic. Eye: pupils are round// EOM are intact // Neck: ROM WNL  // Hearings Intact   Respiratory: Breathing non labored     ANKLE/FOOT left    Gait: slow  Tenderness: Swelling, to the left great toe IP region, is present, skin slightly warm. She is tender to the medial portion of the left great toe, IP. No redness, across the MTP region. Cutaneous: mild swelling of left great toe  Joint Motion: No motion at the great toe MTP and IP regions, diminished range of motion of the lesser digits. Joint / Tendon Stability: No Ankle or Subtalar instability or joint laxity. No peroneal sublux ability or dislocation  Alignment: neutral Hindfoot,    Neuro Motor/Sensory: NL/NL  Vascular: NL foot/ankle pulses,   Lymphatics: No extremity lymphedema, No calf swelling, no tenderness to calf muscles. CHART REVIEW     Carlos Dickson has been experiencing pain and discomfort confirmed as outlined in the pain assessment outlined below.  was reviewed by Tacos Castellanos MD on 7/19/2021.      Pain Assessment  7/19/2021   Location of Pain Foot   Pain Location Comment -   Location Modifiers Left   Severity of Pain 7   Quality of Pain Other (Comment)   Quality of Pain Comment -   Duration of Pain -   Frequency of Pain Intermittent   Frequency of Pain Comment -   Date Pain First Started -   Aggravating Factors Walking   Aggravating Factors Comment -   Limiting Behavior Some   Relieving Factors Rest   Relieving Factors Comment -   Result of Injury -        Carlos Dickson  has a past medical history of Anemia, Chronic pain, Contact lens/glasses fitting, Finger pain, left, Frequent UTI (2006), GERD (gastroesophageal reflux disease), H/O eating disorder, Hip fracture, left ([address] (2000), Hypertension, Leg length discrepancy, Needs pre-anesthesia assessment, Post herpetic neuralgia, PUD (peptic ulcer disease), Rheumatoid arthritis ([facility] Rheumatoid arthritis [facility]), Shingles (April 2014), and Tooth abscess (10/6/15). Patients is employed at:         Past Medical History:   Diagnosis Date    Anemia     Chronic pain     Contact lens/glasses fitting     Finger pain, left     Frequent UTI 2006    GERD (gastroesophageal reflux disease)     H/O eating disorder     remote    Hip fracture, left ([address] 2000    Hypertension     Leg length discrepancy     Needs pre-anesthesia assessment     Patient reports a history of high tolerance to pain medicine and prior hisoty of drug use    Post herpetic neuralgia     PUD (peptic ulcer disease)     Rheumatoid arthritis ([facility] [address]     Rheumatoid arthritis ([facility] [address]     Shingles April 2014    Tooth abscess 10/6/15    Completed Amoxicillin      Past Surgical History:   Procedure Laterality Date    FOOT/TOES SURGERY PROC UNLISTED      HX COLONOSCOPY      HX GYN  2012    fibroids    HX ORTHOPAEDIC Left 2012    partial hip replacement    HX ORTHOPAEDIC  2009    attempt bunionectomy    HX ORTHOPAEDIC  10/2015    Dr. Lancaster Postal: First [address] HX ORTHOPAEDIC  2014    redo left hip replacement    NEUROLOGICAL PROCEDURE UNLISTED Bilateral 2016    Thalamotomy      Current Outpatient Medications   Medication Sig    amoxicillin-clavulanate (AUGMENTIN) 500-125 mg per tablet Take 1 Tablet by mouth two (2) times a day.  oxyCODONE IR (ROXICODONE) 5 mg immediate release tablet Take 5 mg by mouth every six (6) hours as needed for Pain.     rivaroxaban (Xarelto) 10 mg tablet TAKE ONE TABLET BY MOUTH PER DAY FOLLOWING SURGERY  Indications: treatment to prevent recurrence of a clot in a deep vein    polyethylene glycol (Miralax) 17 gram packet Take 1 Packet by mouth daily.  levETIRAcetam (Keppra) 1,000 mg tablet Take 1,500 mg by mouth daily. Indications: Neuralgia    gabapentin (NEURONTIN) 600 mg tablet Take 600 mg by mouth three (3) times daily.  methotrexate (RHEUMATREX) 2.5 mg tablet Take 15 mg by mouth every Sunday.  folic acid (FOLVITE) 1 mg tablet Take  by mouth daily.  hydrOXYchloroQUINE (PLAQUENIL) 200 mg tablet Take 200 mg by mouth two (2) times a day.  azaTHIOprine (Imuran) 50 mg tablet Take 50 mg by mouth three (3) times daily.  prochlorperazine (COMPAZINE) 10 mg tablet Take 5 mg by mouth every six (6) hours as needed.  polyethylene glycol (MIRALAX) 17 gram/dose powder Take 17 g by mouth daily.  dextroamphetamine-amphetamine (ADDERALL) 10 mg tablet Take 10 mg by mouth two (2) times a day.  0.9% sodium chloride solution     medroxyPROGESTERone (PROVERA) 5 mg tablet Take 5 mg by mouth daily.  imipramine (TOFRANIL) 25 mg tablet Take 300 mg by mouth nightly.  polyethylene glycol (MIRALAX) 17 gram/dose powder Take 17 g by mouth daily.  CALCIUM CARBONATE/VITAMIN D3 (CALCIUM+D PO) Take  by mouth.  VITAMIN B COMPLEX PO Take  by mouth.  multivitamin (ONE A DAY) tablet Take 1 Tab by mouth daily.  estradiol (ESTRACE) 0.5 mg tablet Take  by mouth.  dronabinol (MARINOL) 5 mg capsule Take 5 mg by mouth two (2) times daily as needed. Indications: HEADACHE    carvedilol (COREG) 6.25 mg tablet Take 6.25 mg by mouth two (2) times a day.  alendronate (FOSAMAX) 70 mg tablet Take  by mouth every Sunday.  venlafaxine-SR (EFFEXOR XR) 150 mg capsule Take  by mouth daily.  baclofen (LIORESAL) 10 mg tablet Take 20 mg by mouth four (4) times daily.  pantoprazole (PROTONIX) 40 mg tablet Take 40 mg by mouth daily. Twice a day    predniSONE (DELTASONE) 10 mg tablet Take  by mouth daily (with breakfast).     HYDROmorphone (DILAUDID) 2 mg tablet Take one tablet PO Q 6 HRS as needed for **POST OPERATIVE BREAKTHROUGH PAIN** (Patient not taking: Reported on 2021)    HYDROmorphone (DILAUDID) 2 mg tablet TAKE ONE TABLET BY MOUTH Q 6 HRS PRN for POST OPERATIVE BREAKTHROUGH PAIN  **DO NOT FILL BEFORE 1/10/17**  Indications: PAIN (Patient not taking: Reported on 2021)    promethazine (PHENERGAN) 25 mg tablet Take 1 Tab by mouth every six (6) hours as needed for Nausea. (Patient not taking: Reported on 2021)    biotin 10,000 mcg cap Take  by mouth. (Patient not taking: Reported on 2021)    lamoTRIgine (LAMICTAL) 100 mg tablet Take  by mouth two (2) times a day. (Patient not taking: Reported on 2021)    ondansetron hcl (ZOFRAN, AS HYDROCHLORIDE,) 8 mg tablet Take 8 mg by mouth every eight (8) hours as needed for Nausea. (Patient not taking: Reported on 2021)    methylphenidate (RITALIN) 10 mg tablet Take  by mouth three (3) times daily. (Patient not taking: Reported on 2021)     No current facility-administered medications for this visit.      Allergies   Allergen Reactions    Aspirin Unknown (comments)     Severe stomach pain and bleeding    Lyrica [Pregabalin] Other (comments)     Slurred speech    Nsaids (Non-Steroidal Anti-Inflammatory Drug) Other (comments)     Hx  Of bleeding ulcers    Sulfa (Sulfonamide Antibiotics) Rash    Tetracycline Hives     Social History     Occupational History    Not on file   Tobacco Use    Smoking status: Former Smoker     Quit date: 2015     Years since quittin.0    Smokeless tobacco: Never Used   Vaping Use    Vaping Use: Never used   Substance and Sexual Activity    Alcohol use: No    Drug use: No     Comment: Prior history of drug use- pt denies    Sexual activity: Not on file     Family History   Problem Relation Age of Onset    Arthritis-osteo Other     Stroke Mother     Hypertension Mother         DIAGNOSTIC LAB DATA      No results found for: HBA1C, YIG0YTXA, [id number] //   Lab Results   Component Value Date/Time    Glucose 81 03/22/2021 01:14 PM        No results found for: BEI1MWMC, [id number]      Lab Results   Component Value Date/Time    Vitamin D 25-Hydroxy 43.8 03/22/2021 01:14 PM         REVIEW OF SYSTEMS : 7/19/2021  ALL BELOW ARE Negative except : SEE HPI     All other systems reviewed and are negative. 12 point review of systems otherwise negative unless noted in HPI. DIAGNOSTIC IMAGING /ORDERS       Orders Placed This Encounter    CT FOOT LT WO CONT     Standing Status:   Future     Standing Expiration Date:   8/19/2021     Order Specific Question:   Region of foot     Answer: Fore    CBC WITH AUTOMATED DIFF     Standing Status:   Future     Standing Expiration Date:   8/19/2021    SED RATE (ESR)     Standing Status:   Future     Standing Expiration Date:   8/19/2021    C REACTIVE PROTEIN, QT     Standing Status:   Future     Standing Expiration Date:   8/19/2021    amoxicillin-clavulanate (AUGMENTIN) 500-125 mg per tablet     Sig: Take 1 Tablet by mouth two (2) times a day. Dispense:  20 Tablet     Refill:  0        No new x-rays today. Her June 7, 2021, x-rays, show that. That her left great toe IP arthrodesis, was not yet solid, looks worse more bony formation plantar. There is intramedullary screw, that is in place. There is a dorsal MTP arthrodesis, that solid, for stress MTP arthrodesis site is fused. I have reviewed the results of the above study. The interpretation of this study is my professional opinion. On this date 07/19/2021 I have spent 25 minutes reviewing previous notes, test results and face to face with the patient discussing the diagnosis and importance of compliance with the treatment plan as well as documenting on the day of the visit. An electronic signature was used to authenticate this note.       Disclaimer: Sections of this note are dictated using utilizing voice recognition software, which may have resulted in some phonetic based errors in grammar and contents. Even though attempts were made to correct all the mistakes, some may have been missed, and remained in the body of the document. If questions arise, please contact our department. Celena Ashraf may have a reminder for a \"due or due soon\" health maintenance. I have asked that she contact her primary care provider for follow-up on this health maintenance. Sakshi Mendes and Delta Cuevas, as dictated by Tray Romeo.  MD Jocy  7/19/2021  7:54 AM

## 2021-07-20 DIAGNOSIS — Z98.890 POST-OPERATIVE STATE: ICD-10-CM

## 2021-07-20 NOTE — TELEPHONE ENCOUNTER
Patient self-scheduled while I was obtaining the authorization. Pre-authorization is [id number], effective 07/20/21-09/17/21.

## 2021-07-23 ENCOUNTER — HOSPITAL ENCOUNTER (OUTPATIENT)
Dept: CT IMAGING | Age: 56
Discharge: HOME | End: 2021-07-23
Attending: ORTHOPAEDIC SURGERY
Payer: MEDICARE

## 2021-07-23 PROCEDURE — 73700 CT LOWER EXTREMITY W/O DYE: CPT

## 2021-07-26 ENCOUNTER — OFFICE VISIT (OUTPATIENT)
Dept: ORTHOPEDIC SURGERY | Age: 56
End: 2021-07-26
Payer: MEDICARE

## 2021-07-26 VITALS — WEIGHT: 104 LBS | TEMPERATURE: 96.9 F | BODY MASS INDEX: 18.43 KG/M2 | HEIGHT: 63 IN

## 2021-07-26 DIAGNOSIS — M96.89 NONUNION OF OSTEOTOMY SITE: Primary | ICD-10-CM

## 2021-07-26 DIAGNOSIS — Z01.818 PRE-OPERATIVE EXAMINATION: ICD-10-CM

## 2021-07-26 PROCEDURE — G8510 SCR DEP NEG, NO PLAN REQD: HCPCS | Performed by: ORTHOPAEDIC SURGERY

## 2021-07-26 PROCEDURE — 99214 OFFICE O/P EST MOD 30 MIN: CPT | Performed by: ORTHOPAEDIC SURGERY

## 2021-07-26 PROCEDURE — 3017F COLORECTAL CA SCREEN DOC REV: CPT | Performed by: ORTHOPAEDIC SURGERY

## 2021-07-26 PROCEDURE — G8427 DOCREV CUR MEDS BY ELIG CLIN: HCPCS | Performed by: ORTHOPAEDIC SURGERY

## 2021-07-26 PROCEDURE — G8418 CALC BMI BLW LOW PARAM F/U: HCPCS | Performed by: ORTHOPAEDIC SURGERY

## 2021-07-26 NOTE — H&P
FOOT AND ANKLE HISTORY AND PHYSICAL        Patient: Ceci Barrera                   MRN: [id number]         SSN: [id number]  YOB: 1965                        AGE: 61 y.o. SEX: female      Patient scheduled for:  Removal of deep hardware left great toe, revision great toe fusion procedure; intraoperative cultures  Date of surgery: 8/6/21   [facility]   Surgeon: Nga Mary. MD Jocy  Instrumentation: Synthes, Accumed  Other Equipment: C-Arm, Long Fluro table  ANESTHESIA TYPE:  General, Popliteal block          ORDERS PLACED DURING H&P:    Orders Placed This Encounter    SCHEDULE SURGERY     Scheduling Instructions:      PATIENT NAME: Ceci Barrera             PROCEDURE LOCATION: [facility]      TIME LINE FOR PROCEDURE :Elective      LENGTH OF PROCEDURE: 1 HOURS      ASSISTANT:  Best FRANCO , yes please      PROCEDURE DATE:   8/5/2021      ADMIT: Outpatient      No diagnosis found. PROCEDURE: Left great toe IP region [id number]      Irrigation debridement, left great toe IP region, possible revision arthrodesis       [id number]             ANESTHESIA: general anesthesia and regional anesthesia            EQUIPMENT:       C-Arm, Long Fluro Table, SYNTHES, ACCUMED             HARDWARE:         ALLOGRAFT:          C ARM:  YES       SURGERY REP:  , yes       PHONE NUMBER:             LABS PREOP      PT/INR, URIC ACID, CXR PA/LATERAL             CLEARANCES: PCP,             Is Patient on Blood Thinners?: No:               No orders of the defined types were placed in this encounter.  XR CHEST PA LAT     Standing Status:   Future     Standing Expiration Date:   1/28/2022     Scheduling Instructions:      Please recheck to confirm if:      1. Patient has Allergry to IV contrast dye      2.  Patient is pregnant     Order Specific Question:   Reason for Exam     Answer:   Preop Risk stratificatiion    METABOLIC PANEL, COMPREHENSIVE     Standing Status:   Future     Standing Expiration Date:   7/27/2022    PTT     Standing Status:   Future     Standing Expiration Date:   7/27/2022    URIC ACID     Standing Status:   Future     Standing Expiration Date:   7/27/2022    EKG, 12 LEAD, SUBSEQUENT     preop     Standing Status:   Future     Standing Expiration Date:   1/24/2022     Order Specific Question:   Reason for Exam:     Answer:   risk stratify        Post Operative Prescriptions have not been prescribed during the H&P          HISTORY:     The patient was seen in the office today for a preoperative history and physical for an upcoming above listed surgery. The patient is a pleasant 61 y.o. female who has a history of a Left Great Toe Interphalangeal Fusion, Flexor Tenotomy; Left Fourth Toe Revision Interphalangeal Hammer Toe, Proximal Interphalangeal Fusion/c-arm/synthes - Left completed on 3/26/2021. Unfortunately, the patient has developed a nonunion and will need revision surgery. Due to the current findings, affected activity of daily living and continued pain and discomfort, surgical intervention is indicated. The alternatives, risks, and complications, including but not limited to infection, blood loss, need for blood transfusion, neurovascular damage, tayo-incisional numbness, subcutaneous hematoma, bone fracture, anesthetic complications, DVT, PE, death, RSD, postoperative stiffness and pain, possible surgical scar, delayed healing and nonhealing, reflexive sympathetic dystrophy, damage to blood vessels and nerves, need for more surgery, MI, and stroke, failure of hardware, gait disturbances  have been discussed. The patient understands and wishes to proceed with surgery.        PAST MEDICAL HISTORY:     Past Medical History:   Diagnosis Date    Anemia     Chronic pain     Contact lens/glasses fitting     Finger pain, left     Frequent UTI 2006    GERD (gastroesophageal reflux disease)     H/O eating disorder     remote    Hip fracture, left ([facility] [address] 2000    History of hormone replacement therapy     Hypertension     Leg length discrepancy     Needs pre-anesthesia assessment     Patient reports a history of high tolerance to pain medicine and prior hisoty of drug use    Post herpetic neuralgia     PUD (peptic ulcer disease)     Raynaud's disease     Rheumatoid arthritis ([facility] [address]     Rheumatoid arthritis ([facility] [address]     Shingles April 2014    Tooth abscess 10/6/15    Completed Amoxicillin        CURRENT MEDICATIONS:     Current Outpatient Medications   Medication Sig Dispense Refill    amoxicillin-clavulanate (AUGMENTIN) 500-125 mg per tablet Take 1 Tablet by mouth two (2) times a day. 20 Tablet 0    oxyCODONE IR (ROXICODONE) 5 mg immediate release tablet Take 5 mg by mouth every six (6) hours as needed for Pain.  rivaroxaban (Xarelto) 10 mg tablet TAKE ONE TABLET BY MOUTH PER DAY FOLLOWING SURGERY  Indications: treatment to prevent recurrence of a clot in a deep vein 30 Tab 0    polyethylene glycol (Miralax) 17 gram packet Take 1 Packet by mouth daily. 10 Packet 1    levETIRAcetam (Keppra) 1,000 mg tablet Take 1,500 mg by mouth daily. Indications: Neuralgia      gabapentin (NEURONTIN) 600 mg tablet Take 600 mg by mouth three (3) times daily.  methotrexate (RHEUMATREX) 2.5 mg tablet Take 15 mg by mouth every Sunday.  folic acid (FOLVITE) 1 mg tablet Take  by mouth daily.  hydrOXYchloroQUINE (PLAQUENIL) 200 mg tablet Take 200 mg by mouth two (2) times a day.  azaTHIOprine (Imuran) 50 mg tablet Take 50 mg by mouth three (3) times daily.  prochlorperazine (COMPAZINE) 10 mg tablet Take 5 mg by mouth every six (6) hours as needed.  polyethylene glycol (MIRALAX) 17 gram/dose powder Take 17 g by mouth daily. 255 g 1    dextroamphetamine-amphetamine (ADDERALL) 10 mg tablet Take 10 mg by mouth two (2) times a day.       0.9% sodium chloride solution       medroxyPROGESTERone (PROVERA) 5 mg tablet Take 5 mg by mouth daily.  polyethylene glycol (MIRALAX) 17 gram/dose powder Take 17 g by mouth daily. 255 g 1    CALCIUM CARBONATE/VITAMIN D3 (CALCIUM+D PO) Take  by mouth.  multivitamin (ONE A DAY) tablet Take 1 Tab by mouth daily.  estradiol (ESTRACE) 0.5 mg tablet Take  by mouth.  dronabinol (MARINOL) 5 mg capsule Take 5 mg by mouth two (2) times daily as needed. Indications: HEADACHE      carvedilol (COREG) 6.25 mg tablet Take 6.25 mg by mouth two (2) times a day.  alendronate (FOSAMAX) 70 mg tablet Take  by mouth every Sunday.  venlafaxine-SR (EFFEXOR XR) 150 mg capsule Take  by mouth daily.  baclofen (LIORESAL) 10 mg tablet Take 20 mg by mouth four (4) times daily.  pantoprazole (PROTONIX) 40 mg tablet Take 40 mg by mouth daily. Twice a day      HYDROmorphone (DILAUDID) 2 mg tablet Take one tablet PO Q 6 HRS as needed for **POST OPERATIVE BREAKTHROUGH PAIN** (Patient not taking: Reported on 7/19/2021) 50 Tab 0    HYDROmorphone (DILAUDID) 2 mg tablet TAKE ONE TABLET BY MOUTH Q 6 HRS PRN for POST OPERATIVE BREAKTHROUGH PAIN  **DO NOT FILL BEFORE 1/10/17**  Indications: PAIN (Patient not taking: Reported on 7/19/2021) 50 Tab 0    imipramine (TOFRANIL) 25 mg tablet Take 300 mg by mouth nightly.  promethazine (PHENERGAN) 25 mg tablet Take 1 Tab by mouth every six (6) hours as needed for Nausea. (Patient not taking: Reported on 7/19/2021) 30 Tab 0    biotin 10,000 mcg cap Take  by mouth. (Patient not taking: Reported on 7/19/2021)      VITAMIN B COMPLEX PO Take  by mouth. (Patient not taking: Reported on 7/26/2021)      lamoTRIgine (LAMICTAL) 100 mg tablet Take  by mouth two (2) times a day. (Patient not taking: Reported on 7/19/2021)      ondansetron hcl (ZOFRAN, AS HYDROCHLORIDE,) 8 mg tablet Take 8 mg by mouth every eight (8) hours as needed for Nausea.  (Patient not taking: Reported on 7/19/2021)      methylphenidate (RITALIN) 10 mg tablet Take  by mouth three (3) times daily. (Patient not taking: Reported on 2021)      predniSONE (DELTASONE) 10 mg tablet Take  by mouth daily (with breakfast). (Patient not taking: Reported on 2021)         ALLERGIES:     Allergies   Allergen Reactions    Aspirin Unknown (comments)     Severe stomach pain and bleeding    Lyrica [Pregabalin] Other (comments)     Slurred speech    Nsaids (Non-Steroidal Anti-Inflammatory Drug) Other (comments)     Hx  Of bleeding ulcers    Sulfa (Sulfonamide Antibiotics) Rash    Tetracycline Hives         SURGICAL HISTORY:     Past Surgical History:   Procedure Laterality Date    FOOT/TOES SURGERY PROC UNLISTED      HX COLONOSCOPY      HX GYN  2012    fibroids    HX ORTHOPAEDIC Left 2012    partial hip replacement    HX ORTHOPAEDIC  2009    attempt bunionectomy    HX ORTHOPAEDIC  10/2015    Dr. Lisette Whitney: First Metatarsophylangeal Fusion,etc    HX ORTHOPAEDIC  2014    redo left hip replacement    NEUROLOGICAL PROCEDURE UNLISTED Bilateral 2016    Thalamotomy        SOCIAL HISTORY:     Social History     Socioeconomic History    Marital status:      Spouse name: Not on file    Number of children: Not on file    Years of education: Not on file    Highest education level: Not on file   Tobacco Use    Smoking status: Former Smoker     Quit date: 2015     Years since quittin.0    Smokeless tobacco: Never Used   Vaping Use    Vaping Use: Never used   Substance and Sexual Activity    Alcohol use: No    Drug use: No     Comment: Prior history of drug use- pt denies     Social Determinants of Health     Financial Resource Strain:     Difficulty of Paying Living Expenses:    Food Insecurity:     Worried About Running Out of Food in the Last Year:     Ran Out of Food in the Last Year:    Transportation Needs:     Lack of Transportation (Medical):      Lack of Transportation (Non-Medical):    Physical Activity:     Days of Exercise per Week:  Minutes of Exercise per Session:    Stress:     Feeling of Stress :    Social Connections:     Frequency of Communication with Friends and Family:     Frequency of Social Gatherings with Friends and Family:     Attends Mosque Services:     Active Member of Clubs or Organizations:     Attends Club or Organization Meetings:     Marital Status:        FAMILY HISTORY:     Family History   Problem Relation Age of Onset    Arthritis-osteo Other     Stroke Mother     Hypertension Mother        REVIEW OF SYSTEMS:     Negative for fevers, chills, chest pain, shortness of breath, weight loss, recent illness     General: Negative for fever and chills. No unexpected change in weight. Denies fatigue. No change in appetite. Skin: Negative for rash or itching. HEENT: Negative for congestion, sore throat, neck pain and neck stiffness. No change in vision or hearing. Hasn't noted any enlarged lymph nodes in the neck. Cardiovascular:  Negative for chest pain and palpitations. Has not noted pedal edema. Respiratory: Negative for cough, colds, sinus, hemoptysis, shortness of breath and wheezing. Gastrointestinal: Negative for nausea and vomiting, rectal bleeding, coffee ground emesis, abdominal pain, diarrhea and constipation. Genitourinary: Negative for dysuria, frequency urgency, or burning on micturition. No flank pain, no foul smelling urine, no difficulty with initiating urination. Hematological: Negative for bleeding or easy bruising. Musculoskeletal: Negative for arthralgias, back pain or neck pain. Neurological: Negative for dizziness, seizures or syncopal episodes. Denies headaches. Endocrine: Denies excessive thirst.  No heat/cold intolerance. Psychiatric: Negative for depression or insomnia.     PHYSICAL EXAMINATION:     VITALS:   Visit Vitals  Temp 96.9 °F (36.1 °C) (Temporal)   Ht 5' 3\" (1.6 m)   Wt 104 lb (47.2 kg)   BMI 18.42 kg/m²       Pain Assessment  7/26/2021   Location of Pain Foot Pain Location Comment -   Location Modifiers Left   Severity of Pain 7   Quality of Pain Sharp; Throbbing;Dull;Aching;Burning   Quality of Pain Comment -   Duration of Pain Persistent   Frequency of Pain Constant   Frequency of Pain Comment -   Date Pain First Started -   Aggravating Factors Walking;Standing;Stairs; Bending;Stretching   Aggravating Factors Comment -   Limiting Behavior Some   Relieving Factors Elevation;Ice;Rest   Relieving Factors Comment -   Result of Injury No        GEN:  Well developed, well nourished 61 y.o. female in no acute distress. PSYCH: Alert an oriented to person, place and time. Mood, memory, affect, behavior and judgment normal. Speech normal in context and clarity. HEENT: Normocephalic and atraumatic. Eyes: Conjunctivae and EOM are normal.Pupils are equal, round, and reactive to light. External ear normal appearance, external nose normal appearing. Mouth/Throat: Oropharynx is clear and moist, able to handle oral secretions w/out difficulty, airway patent. NECK: Supple. Normal ROM, No lymphadenopathy. Trachea is midline. No bruising, swelling or deformity  RESP: Clear to auscultation bilaterally. No audible wheezing from mouth. No rales, rhonchi. Normal effort and breath sounds. No respiratory use of accessory muscles during breathing or distress  CHEST/ABDOMEN: Observation reveals: No audible wheezing from mouth. No accessory use of chest muscles during breathing. Non tender abdomen  CARDIO:  Normal rate, regular rhythm and normal heart sounds. No MGR. ABDOMEN: Non-tender, non-distended, normoactive bowel sounds in all four quadrants. There is no tenderness. There is no rebound and no guarding.    BACK: No CVA or spinal tenderness  BREAST:  Deferred  PELVIC:    Deferred   RECTAL:  Deferred   :           Deferred  EXTREMITIES: EXAMINATION OF:   SKIN: mild edema , no erythema, mild  ecchymosis    TENDERNESS:  mild tenderness to palpation  NEUROVASCULAR: Sensation intact to light touch and strength grossly intact and symmetrical. No nystagmus. Positive distal pulses and capillary refill. Extremities warm and well perfused  DVT ASSESSMENT:  Neg calf tenderness. No evidence of DVT seen on physical exam.  ROM: Not tested  MOTOR: In tact    RADIOGRAPHS & DIAGNOSTIC STUDIES:     No notes on file      LABS:     PENDING       ASSESSMENT:     Encounter Diagnoses     ICD-10-CM ICD-9-CM   1. Nonunion of osteotomy site  M96.89 998.89   2. Pre-operative examination  Z01.818 V72.84        PLAN:     Again, the alternatives, risks, and complications, as well as expected outcome were discussed. The patient understands and agrees to proceed with the above listed surgery pending completion of pre-operative labs and diagnostic studies. Follow-up and Dispositions    · Return in about 16 days (around 8/11/2021) for post surgical evaluation. The patient was counseled at length about the risks of juan Covid-19 during their perioperative period and any recovery window from their procedure. The patient was made aware that juan Covid-19  may worsen their prognosis for recovering from their procedure and lend to a higher morbidity and/or mortality risk. All material risks, benefits, and reasonable alternatives including postponing the procedure were discussed. The patient does wish to proceed with the procedure at this time. Carey Huffman [facility], MPA, PA-C  7/26/2021  3:05 PM

## 2021-07-26 NOTE — PATIENT INSTRUCTIONS
Dr. Barbara Suggs Pre-operative Instructions:      Patient: Marcela Olivera   :  1965     I understand I am to stop taking oral birth control pills, hormonal replacement therapy and all Aspirin, Aspirin containing medications, Non-Steroidal Anti-Inflammatory medications (such as Advil, Aleve, Motrin, Ibuprofen) and or Blood thinner medication such as Coumadin, Plavix, Heparin or others 5 days prior to surgery. I understand I am to STOP taking these Medications 5 days prior to surgery:  I am to get instructions from my prescribing physician. 1. __As listed above_______________________  2. _____________________________________  3. _____________________________________  4. _____________________________________    I understand that if I am taking daily medications for high blood pressure, I can take them the morning of surgery with a small sip of water. I will consult my prescribing physician or call [facility] with specific questions. I also understand that:     I am to report important observations or changes that may occur prior to surgery. If I have any changes in my physical condition, such as a rash, a fever, sore throat, abscess, ulcers, nausea, vomiting, or diarrhea. I am to call the office and I am to consult my primary care physician to assess and treat the problem.  I am not to eat or drink anything after midnight the night before my surgery.  I am not to drink alcoholic beverages 24 hours prior to surgery.  I am not to do any illegal drugs prior to surgery.  I am not to smoke at least 24 hours prior to surgery.  I am able and will shower or bathe before surgery. I will use the Hibiclens solution on my surgical site only. The hibiclens directions are one packet a day starting two days before surgery.  I will remove any nail polish, make-up or jewelry prior to arriving for my surgery.       If I wear glasses, contact lenses or dentures they must be removed prior to going to the operating room.  All body piercing and artifical eye-lashes must be removed prior to surgery     I will not wear any aerosol sprays, perfumes or skin creams.  I am to make arrangements for a family member or friend to accompany me to surgery and take me home after my surgery as I will not be allowed to leave the hospital alone. A cab or bus will not be acceptable. Please make arrange for someone to stay with you for 24 hours after surgery.  Patient has expressed understanding of the diagnosis, treatment and planned surgery      Post operative medications will be e-scribed to your pharmacy on record if possible        Acute Pain After Surgery: Care Instructions  Your Care Instructions      It's common to have some pain after surgery. Pain doesn't mean that something is wrong or that the surgery didn't go well. But when the pain is severe, it's important to work with your doctor to manage it. It's also important to be aware of a few facts about pain and pain medicine. · You are the only person who knows what your pain feels like. So be sure to tell your doctor when you are in pain or when the pain changes. Then he or she will know how to adjust your medicines. · Pain is often easier to control right after it starts. So it may be better to take regular doses of pain medicine and not wait until the pain gets bad. · Medicine can help control pain. But this doesn't mean you'll have no pain. Medicine works to keep the pain at a level you can live with. With time, you will feel better. Follow-up care is a key part of your treatment and safety. Be sure to make and go to all appointments, and call your doctor if you are having problems. It's also a good idea to know your test results and keep a list of the medicines you take. How can you care for yourself at home? · Be safe with medicines. Read and follow all instructions on the label.   ¨ If the doctor gave you a prescription medicine for pain, take it as prescribed. ¨ If you are not taking a prescription pain medicine, ask your doctor if you can take an over-the-counter medicine. · If you take an over-the-counter pain medicine, such as acetaminophen (Tylenol), ibuprofen (Advil, Motrin), or naproxen (Aleve), read and follow all instructions on the label. · Do not take two or more pain medicines at the same time unless the doctor told you to. · Do not drink alcohol while you are taking pain medicines. · Try to walk each day if your doctor recommends it. Start by walking a little more than you did the day before. Bit by bit, increase the amount you walk. Walking increases blood flow. It also helps prevent pneumonia and constipation. · To prevent constipation from opioid pain medicines:  ¨ Talk to your doctor about a laxative. ¨ Include fruits, vegetables, beans, and whole grains in your diet each day. These foods are high in fiber. ¨ Drink plenty of fluids, enough so that your urine is light yellow or clear like water. Drink water, fruit juice, or other drinks that do not contain caffeine or alcohol. If you have kidney, heart, or liver disease and have to limit fluids, talk with your doctor before you increase the amount of fluids you drink. ¨ Take a fiber supplement, such as Citrucel or Metamucil, every day if needed. Read and follow all instructions on the label. If you take pain medicine for more than a few days, talk to your doctor before you take fiber. When should you call for help? Call your doctor now or seek immediate medical care if:   · Your pain gets worse. · Your pain is not controlled by medicine. Watch closely for changes in your health, and be sure to contact your doctor if you have any problems.

## 2021-07-26 NOTE — PROGRESS NOTES
AMBULATORY PROGRESS NOTE      Patient: Naun Hoffmann             MRN: [id number]     SSN: [id number] Body mass index is 18.42 kg/m². YOB: 1965     AGE: 61 y.o. 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 She can listening to her, stopped her autoimmune medications, her methotrexate, Imuran. She had blood work, access concern about possible infection and also left great toe IP region. She had blood work done, lengthy [address] This the blood work, was done on July 20, 2021. The ESR was normal at 3, the CRP was normal at 2. The white cell count was low at 3.8, the differential, was unremarkable for any shift. The granulocyte percentage, the lymphocyte percentage, were normal.    Her x-rays, shows a nonunion to the left great toe IP region. Her MRI, shows a nonunion left great toe IP region, with concomitant swelling. There is no abscess. It is at its worse there may be an infection. Recommendation, recommendation, is for removal of deep hardware left great toe, revision left great toe IP arthrodesis, intraoperative cultures. We will get her surgery scheduled as soon as possible. DIAGNOSES    1. Nonunion of osteotomy site    2. Pre-operative examination        Orders Placed This Encounter    SCHEDULE SURGERY     Scheduling Instructions:      PATIENT NAME: Naun Hoffmann             PROCEDURE LOCATION: [facility]      TIME LINE FOR PROCEDURE :Elective      LENGTH OF PROCEDURE: 1 HOURS      ASSISTANT:  Pio FRANCO , yes please      PROCEDURE DATE:   8/5/2021      ADMIT: Outpatient      No diagnosis found.             PROCEDURE: Left great toe IP region 20680      Irrigation debridement, left great toe IP region, possible revision arthrodesis       08072             ANESTHESIA: general anesthesia and regional anesthesia            EQUIPMENT:       C-Arm, Long Fluro Table, SYNTHES, ACCUMED             HARDWARE:         ALLOGRAFT:          C ARM:  YES       SURGERY REP:  , yes       PHONE NUMBER:             LABS PREOP      PT/INR, URIC ACID, CXR PA/LATERAL             CLEARANCES: PCP,             Is Patient on Blood Thinners?: No:               No orders of the defined types were placed in this encounter.  XR CHEST PA LAT     Standing Status:   Future     Standing Expiration Date:   1/28/2022     Scheduling Instructions:      Please recheck to confirm if:      1. Patient has Allergry to IV contrast dye      2. Patient is pregnant     Order Specific Question:   Reason for Exam     Answer:   Preop Risk stratificatiion    METABOLIC PANEL, COMPREHENSIVE     Standing Status:   Future     Standing Expiration Date:   7/27/2022    PTT     Standing Status:   Future     Standing Expiration Date:   7/27/2022    URIC ACID     Standing Status:   Future     Standing Expiration Date:   7/27/2022    EKG, 12 LEAD, SUBSEQUENT     preop     Standing Status:   Future     Standing Expiration Date:   1/24/2022     Order Specific Question:   Reason for Exam:     Answer:   risk stratify        PLAN:    1. Being planned, left forefoot        RTO- TO BE DETERMINED    Jacqui Elizabeth  expresses understanding of the diagnosis, treatment plan, and all of their proposed questions were answered to their satisfaction. Patient education has been provided re the diagnoses. HPI //  Brian 12 A 61 y.o. female who is a/an  established patient, presenting to my outpatient office for evaluation of  the following chief complaint(s):     Chief Complaint   Patient presents with    Foot Pain     left       At [address] patient presented with left foot pain. Ordered a CBC w/ diff, ESR, and CRP. CT of left forefoot. Ordered Augmentin 500mg BID.     Since LOV patient presents for f/u of CBC w/ diff, ESR, CRP, and CT of left forefoot. Patient notes a burning/throbbing sensations in her left foot. She states she has not started taking the antibiotics yet. Patient notes she stopped taking Prednisone in May. She states she had stopped taking Imuran for RA. Patient takes Gabapentin and Keppra. Denies any fever , shakes , or chills. She mentions she's going out of town September 7th ,2021 for a 3 week trip. Visit Vitals  Temp 96.9 °F (36.1 °C) (Temporal)   Ht 5' 3\" (1.6 m)   Wt 104 lb (47.2 kg)   BMI 18.42 kg/m²       Appearance: Alert, well appearing and pleasant patient who is in no distress, oriented to person, place/time, and who follows commands. This patient is accompanied in the examination room by her  friend. There is signs of: no dementia  Psychiatric: Affect/mood are appropriate. Speech normal in context and clarity, memory intact grossly, no involuntary movements - tremors. Patient arrives to office via: with assistive device: HARD SHOE  H EENT (2): Head normocephalic & atraumatic. Eye: pupils are round// EOM are intact // Neck: ROM WNL  // Hearings Intact   Respiratory: Breathing non labored     ANKLE/FOOT left    Gait: antalgic  Tenderness: mild IP GREATE TOE   Cutaneous: MODEATE SWELING GREAT TOE IP REGION  Joint Motion:  NO MOTION MTP //   Joint / Tendon Stability: No Ankle or Subtalar instability or joint laxity. No peroneal sublux ability or dislocation  Alignment: Varus alignment, seen in the great toe, IP region  Neuro Motor/Sensory: NL/NL  Vascular: NL foot/ankle pulses,   Lymphatics: No extremity lymphedema, No calf swelling, no tenderness to calf muscles. CHART REVIEW     Jennifer Hanna has been experiencing pain and discomfort confirmed as outlined in the pain assessment outlined below.  was reviewed by Jina Blank MD on 7/28/2021.      Pain Assessment  7/26/2021   Location of Pain Foot   Pain Location Comment -   Location Modifiers Left Severity of Pain 7   Quality of Pain Sharp; Throbbing;Dull;Aching;Burning   Quality of Pain Comment -   Duration of Pain Persistent   Frequency of Pain Constant   Frequency of Pain Comment -   Date Pain First Started -   Aggravating Factors Walking;Standing;Stairs; Bending;Stretching   Aggravating Factors Comment -   Limiting Behavior Some   Relieving Factors Elevation;Ice;Rest   Relieving Factors Comment -   Result of Injury No        Crissie Sterlington  has a past medical history of Anemia, Chronic pain, Contact lens/glasses fitting, Finger pain, left, Frequent UTI (2006), GERD (gastroesophageal reflux disease), H/O eating disorder, Hip fracture, left ([address] (2000), History of hormone replacement therapy, Hypertension, Leg length discrepancy, Needs pre-anesthesia assessment, Post herpetic neuralgia, PUD (peptic ulcer disease), Raynaud's disease, Rheumatoid arthritis ([facility] Rheumatoid arthritis [facility]), Shingles (April 2014), and Tooth abscess (10/6/15).      Patients is employed at:         Past Medical History:   Diagnosis Date    Anemia     Chronic pain     Contact lens/glasses fitting     Finger pain, left     Frequent UTI 2006    GERD (gastroesophageal reflux disease)     H/O eating disorder     remote    Hip fracture, left ([address] 2000    History of hormone replacement therapy     Hypertension     Leg length discrepancy     Needs pre-anesthesia assessment     Patient reports a history of high tolerance to pain medicine and prior hisoty of drug use    Post herpetic neuralgia     PUD (peptic ulcer disease)     Raynaud's disease     Rheumatoid arthritis ([address]     Rheumatoid arthritis ([facility]     Shingles April 2014    Tooth abscess 10/6/15    Completed Amoxicillin      Past Surgical History:   Procedure Laterality Date    FOOT/TOES SURGERY PROC UNLISTED      HX COLONOSCOPY      HX GYN  2012    fibroids    HX ORTHOPAEDIC Left 2012    partial hip replacement    HX ORTHOPAEDIC  2009    attempt bunionectomy    HX ORTHOPAEDIC  10/2015    Dr. Lucina Snow: John Cox HX ORTHOPAEDIC  2014    redo left hip replacement    NEUROLOGICAL PROCEDURE UNLISTED Bilateral 2016    Thalamotomy      Current Outpatient Medications   Medication Sig    amoxicillin-clavulanate (AUGMENTIN) 500-125 mg per tablet Take 1 Tablet by mouth two (2) times a day.  oxyCODONE IR (ROXICODONE) 5 mg immediate release tablet Take 5 mg by mouth every six (6) hours as needed for Pain.  rivaroxaban (Xarelto) 10 mg tablet TAKE ONE TABLET BY MOUTH PER DAY FOLLOWING SURGERY  Indications: treatment to prevent recurrence of a clot in a deep vein    polyethylene glycol (Miralax) 17 gram packet Take 1 Packet by mouth daily.  levETIRAcetam (Keppra) 1,000 mg tablet Take 1,500 mg by mouth daily. Indications: Neuralgia    gabapentin (NEURONTIN) 600 mg tablet Take 600 mg by mouth three (3) times daily.  methotrexate (RHEUMATREX) 2.5 mg tablet Take 15 mg by mouth every Sunday.  folic acid (FOLVITE) 1 mg tablet Take  by mouth daily.  hydrOXYchloroQUINE (PLAQUENIL) 200 mg tablet Take 200 mg by mouth two (2) times a day.  azaTHIOprine (Imuran) 50 mg tablet Take 50 mg by mouth three (3) times daily.  prochlorperazine (COMPAZINE) 10 mg tablet Take 5 mg by mouth every six (6) hours as needed.  polyethylene glycol (MIRALAX) 17 gram/dose powder Take 17 g by mouth daily.  dextroamphetamine-amphetamine (ADDERALL) 10 mg tablet Take 10 mg by mouth two (2) times a day.  0.9% sodium chloride solution     medroxyPROGESTERone (PROVERA) 5 mg tablet Take 5 mg by mouth daily.  polyethylene glycol (MIRALAX) 17 gram/dose powder Take 17 g by mouth daily.  CALCIUM CARBONATE/VITAMIN D3 (CALCIUM+D PO) Take  by mouth.  multivitamin (ONE A DAY) tablet Take 1 Tab by mouth daily.  estradiol (ESTRACE) 0.5 mg tablet Take  by mouth.     dronabinol (MARINOL) 5 mg capsule Take 5 mg by mouth two (2) times daily as needed. Indications: HEADACHE    carvedilol (COREG) 6.25 mg tablet Take 6.25 mg by mouth two (2) times a day.  alendronate (FOSAMAX) 70 mg tablet Take  by mouth every Sunday.  venlafaxine-SR (EFFEXOR XR) 150 mg capsule Take  by mouth daily.  baclofen (LIORESAL) 10 mg tablet Take 20 mg by mouth four (4) times daily.  pantoprazole (PROTONIX) 40 mg tablet Take 40 mg by mouth daily. Twice a day    HYDROmorphone (DILAUDID) 2 mg tablet Take one tablet PO Q 6 HRS as needed for **POST OPERATIVE BREAKTHROUGH PAIN** (Patient not taking: Reported on 7/19/2021)    HYDROmorphone (DILAUDID) 2 mg tablet TAKE ONE TABLET BY MOUTH Q 6 HRS PRN for POST OPERATIVE BREAKTHROUGH PAIN  **DO NOT FILL BEFORE 1/10/17**  Indications: PAIN (Patient not taking: Reported on 7/19/2021)    imipramine (TOFRANIL) 25 mg tablet Take 300 mg by mouth nightly.  promethazine (PHENERGAN) 25 mg tablet Take 1 Tab by mouth every six (6) hours as needed for Nausea. (Patient not taking: Reported on 7/19/2021)    biotin 10,000 mcg cap Take  by mouth. (Patient not taking: Reported on 7/19/2021)    VITAMIN B COMPLEX PO Take  by mouth. (Patient not taking: Reported on 7/26/2021)    lamoTRIgine (LAMICTAL) 100 mg tablet Take  by mouth two (2) times a day. (Patient not taking: Reported on 7/19/2021)    ondansetron hcl (ZOFRAN, AS HYDROCHLORIDE,) 8 mg tablet Take 8 mg by mouth every eight (8) hours as needed for Nausea. (Patient not taking: Reported on 7/19/2021)    methylphenidate (RITALIN) 10 mg tablet Take  by mouth three (3) times daily. (Patient not taking: Reported on 7/19/2021)    predniSONE (DELTASONE) 10 mg tablet Take  by mouth daily (with breakfast). (Patient not taking: Reported on 7/26/2021)     No current facility-administered medications for this visit.      Allergies   Allergen Reactions    Aspirin Unknown (comments)     Severe stomach pain and bleeding    Lyrica [Pregabalin] Other (comments)     Slurred speech    Nsaids (Non-Steroidal Anti-Inflammatory Drug) Other (comments)     Hx  Of bleeding ulcers    Sulfa (Sulfonamide Antibiotics) Rash    Tetracycline Hives     Social History     Occupational History    Not on file   Tobacco Use    Smoking status: Former Smoker     Quit date: 2015     Years since quittin.0    Smokeless tobacco: Never Used   Vaping Use    Vaping Use: Never used   Substance and Sexual Activity    Alcohol use: No    Drug use: No     Comment: Prior history of drug use- pt denies    Sexual activity: Not on file     Family History   Problem Relation Age of Onset    Arthritis-osteo Other     Stroke Mother     Hypertension Mother         DIAGNOSTIC LAB DATA      No results found for: HBA1C, ABV6XKHW, DEQ6DOUP //   Lab Results   Component Value Date/Time    Glucose 81 2021 01:14 PM        No results found for: EWU4WXBW, PBX3JPJH      Lab Results   Component Value Date/Time    Vitamin D 25-Hydroxy 43.8 2021 01:14 PM         REVIEW OF SYSTEMS : 2021  ALL BELOW ARE Negative except : SEE HPI     All other systems reviewed and are negative. 12 point review of systems otherwise negative unless noted in HPI. DIAGNOSTIC IMAGING /ORDERS       Orders Placed This Encounter    SCHEDULE SURGERY     Scheduling Instructions:      PATIENT NAME: Mj Kilgore             PROCEDURE LOCATION: [facility]      TIME LINE FOR PROCEDURE :Elective      LENGTH OF PROCEDURE: 1 HOURS      ASSISTANT:  Hina FRANCO , yes please      PROCEDURE DATE:   2021      ADMIT: Outpatient      No diagnosis found.             PROCEDURE: Left great toe IP region 74754      Irrigation debridement, left great toe IP region, possible revision arthrodesis       21689             ANESTHESIA: general anesthesia and regional anesthesia            EQUIPMENT:       C-Arm, Long Fluro Table, SYNTHES, ViOptixED             HARDWARE:         ALLOGRAFT:          C ARM:  YES       SURGERY REP:  , yes       PHONE NUMBER:             LABS PREOP      PT/INR, URIC ACID, CXR PA/LATERAL             CLEARANCES: PCP,             Is Patient on Blood Thinners?: No:               No orders of the defined types were placed in this encounter.  XR CHEST PA LAT     Standing Status:   Future     Standing Expiration Date:   1/28/2022     Scheduling Instructions:      Please recheck to confirm if:      1. Patient has Allergry to IV contrast dye      2. Patient is pregnant     Order Specific Question:   Reason for Exam     Answer:   Preop Risk stratificatiion    METABOLIC PANEL, COMPREHENSIVE     Standing Status:   Future     Standing Expiration Date:   7/27/2022    PTT     Standing Status:   Future     Standing Expiration Date:   7/27/2022    URIC ACID     Standing Status:   Future     Standing Expiration Date:   7/27/2022    EKG, 12 LEAD, SUBSEQUENT     preop     Standing Status:   Future     Standing Expiration Date:   1/24/2022     Order Specific Question:   Reason for Exam:     Answer:   risk stratify        CT Results (most recent):  Results from Orders Only encounter on 07/20/21    CT FOOT LT WO CONT    Narrative  EXAM: CT of the left foot obtained. INDICATION:  Left foot pain. Patient status post extensive fusion of the first  toe. TECHNIQUE: CT of  left foot without contrast. Sagittal and coronal reformations  obtained. COMPARISON: Plain film dated 3/26/2021 and 10/23/2015    All CT scans at this facility are performed using dose optimization technique as  appropriate to a performed exam, to include automated exposure control,  adjustment of the mA and/or kV according to patient size (including appropriate  matching for site specific examination) or use of iterative reconstruction  technique. FINDINGS:  Changes of old calcaneal fracture are noted. Old hardware tracts in the subtalar  joint are noted. No residual hardware appreciated.  There is severe  osteoarthritis of the subtalar joint with asymmetric joint space narrowing,  subchondral cystic changes and subchondral sclerosis. No distinct ankylosis  appreciated. The tibiotalar joint demonstrates mild-to-moderate degenerative  changes. Osteophyte formation and asymmetric joint space narrowing. Midfoot is  unremarkable. The TMT joints are unremarkable. Postoperative changes noted in  the second through fifth metatarsal heads. There is fusion of the fourth PIP and  DIP joints without evidence of complication. There is ankylosis of the first MTP  joint as result of side plates and multiple screws within additional  perpendicular screw. No hardware complications within this hardware. There is a  screw traversing the first interphalangeal joint. There is erosive changes at  the articular surface of the first phalangeal joint with widening of the joint  space. There are several loose fragments and fragmentation of the articular  surfaces. The possibility of septic arthritis and osteomyelitis should be  considered. Alternatively, fracture and loosening are possible. There is soft tissue edema adjacent the first interphalangeal joint. No ulcer  identified. No distinct fluid collection identified. There is mild atrophy of  the musculature of the foot. Impression  1.  1st interphalangeal joint fragmentation of the articular surfaces and  widening of the joint. This is concerning for infection. However, fracture and  loosening are in the differential. Correlation with CRP, ESR, and CBC are  recommended. 2.  Adequate fusion of the first MTP joint. 3.  Adequate fusion of the fourth PIP and DIP joints. 4.  Old calcaneal fracture. 5.  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 I have reviewed the results of the above study. The interpretation of this study is my professional opinion.             On this date 07/26/2021 I have spent 35 minutes reviewing previous notes, test results and face to face with the patient discussing the diagnosis and importance of compliance with the treatment plan as well as documenting on the day of the visit. An electronic signature was used to authenticate this note. Disclaimer: Sections of this note are dictated using utilizing voice recognition software, which may have resulted in some phonetic based errors in grammar and contents. Even though attempts were made to correct all the mistakes, some may have been missed, and remained in the body of the document. If questions arise, please contact our department. Celena Healy may have a reminder for a \"due or due soon\" health maintenance. I have asked that she contact her primary care provider for follow-up on this health maintenance.     Yusuf Kirby and Kyle Quinn, as dictated by Sheila Rios MD  7/28/2021  2:44 PM

## 2021-07-29 DIAGNOSIS — M96.89 NONUNION OF OSTEOTOMY SITE: ICD-10-CM

## 2021-07-29 DIAGNOSIS — Z01.818 PRE-OPERATIVE EXAMINATION: ICD-10-CM

## 2021-08-03 DIAGNOSIS — M96.89 NONUNION OF OSTEOTOMY SITE: Primary | ICD-10-CM

## 2021-08-03 DIAGNOSIS — Z01.818 PRE-OPERATIVE EXAMINATION: ICD-10-CM

## 2021-08-03 RX ORDER — POLYETHYLENE GLYCOL 3350 17 G/17G
17 POWDER, FOR SOLUTION ORAL DAILY
Qty: 10 PACKET | Refills: 1 | Status: SHIPPED | OUTPATIENT
Start: 2021-08-03 | End: 2021-08-04

## 2021-08-03 RX ORDER — OXYCODONE HYDROCHLORIDE 5 MG/1
5-10 TABLET ORAL
Qty: 42 TABLET | Refills: 0 | Status: SHIPPED | OUTPATIENT
Start: 2021-08-03 | End: 2021-08-10

## 2021-08-03 NOTE — PROGRESS NOTES
The following post operative prescriptions have been e-scribed to patients [employer] [place] located [facility]:    Orders Placed This Encounter    oxyCODONE IR (ROXICODONE) 5 mg immediate release tablet     Sig: Take 1-2 Tablets by mouth every eight (8) hours as needed for Pain (AFTER SURGERY, NOT BEFORE) for up to 7 days. Max Daily Amount: 30 mg. Dispense:  42 Tablet     Refill:  0    rivaroxaban (Xarelto) 10 mg tablet     Sig: TAKE ONE TABLET BY MOUTH PER DAY FOLLOWING SURGERY  Indications: deep vein thrombosis prevention     Dispense:  30 Tablet     Refill:  0    polyethylene glycol (Miralax) 17 gram packet     Sig: Take 1 Packet by mouth daily.      Dispense:  10 Packet     Refill:  1        Oscar Teran PA-C  8/3/2021  1:20 PM

## 2021-08-03 NOTE — BRIEF OP NOTE
Brief Postoperative Note    Patient: Carlos Dickson  YOB: 1965  MRN: [id number]    Date of Procedure: 8/6/2021     Pre-Op Diagnosis: M96.89 NONUNION OSTEOTOMY SITE, LEFT GREAT TOE IP JOINT/persistent postoperative swelling, great toe IP joint: Rheumatoid arthritis    Post-Op Diagnosis: Same as preoperative diagnosis. Procedure(s):  LEFT GREAT TOE INTERPHALANGEAL REGION IRRIGATION AND DEBRIDEMENT: Bone from the distal end of the proximal phalanx, sent for histopathologic analysis, in order to rule out chronic osteomyelitis. 84393  INTRAOPERATIVE CULTURES   REMOVAL OF DEEP HARDWARE  27775: Ortho helix dorsal plate, IP headless screw. C-ARM/LONG FLOURO TABLE/ACCUTRAK SCREWDRIVER TRAY, PARAGON DORSAL PLATE AND SCREWS/REGIONAL BLOCK    Surgeon(s):  Celestino Sandra MD    Surgical Assistant: Physician Assistant: (Unknown)  Surg Asst-1: (Unknown)    Anesthesia: General and Marcaine 0.5% plain, 16 mL used    Estimated Blood Loss (mL):  25 ml EBL  IV FLUIDS:  1100 ml IVF  Tourniquet Time:  41 minutes at  890  Mm HG     Complications: None    Specimens: * No specimens in log *     Implants: * No surgical log found *    Drains: * No LDAs found *    Findings: Very poor bone quality, at this great toe IP joint, shallow bone in the distal phalanx, no significant cancellous bone at all. Fibrous tissue, at the IP region, nonunion site.     Electronically Signed by Leisa Boogie MD on 8/5/2021 at 2:42 PM

## 2021-08-04 NOTE — PERIOP NOTES
PRE-SURGICAL INSTRUCTIONS        Patient's Name:  Herminio MELGAR Date:  8/4/2021              Surgery Date:  8/6/2021                1. Do NOT eat or drink anything, including candy, gum, or ice chips after midnight on 8/10, unless you have specific instructions from your surgeon or anesthesia provider to do so.  2. You may brush your teeth before coming to the hospital.  3. No smoking 24 hours prior to the day of surgery. 4. No alcohol 24 hours prior to the day of surgery. 5. No recreational drugs for one week prior to the day of surgery. 6. Leave all valuables, including money/purse, at home. 7. Remove all jewelry, nail polish, acrylic nails, and makeup (including mascara); no lotions powders, deodorant, or perfume/cologne/after shave on the skin. 8. Glasses/contact lenses and dentures may be worn to the hospital.  They will be removed prior to surgery. 9. Call your doctor if symptoms of a cold or illness develop within 24-48 hours prior to your surgery. 10.  AN ADULT MUST DRIVE YOU HOME AFTER OUTPATIENT SURGERY. 11.  If you are having an outpatient procedure, please make arrangements for a responsible adult to be with you for 24 hours after your surgery. 12.  NO VISITORS in the hospital at this time for outpatient procedures. Exceptions may be made for surgical admissions, per nursing unit guidelines      Special Instructions:      Bring list of CURRENT medications. .  Bring any pertinent legal medical records. Take these medications the morning of surgery with a sip of water:  Per office        On the day of surgery, come in the main entrance of [facility]. Let the  at the desk know you are there for surgery. A staff member will come escort you to the surgical area on the second floor.     If you have any questions or concerns, please do not hesitate to call:     (Prior to the day of surgery) [facility] department:  [phone]   (Day of surgery) Pre-Op department: [phone]    These surgical instructions were reviewed with the patient during the PAT phone call.

## 2021-08-05 ENCOUNTER — ANESTHESIA EVENT (OUTPATIENT)
Dept: SURGERY | Age: 56
End: 2021-08-05
Payer: MEDICARE

## 2021-08-06 ENCOUNTER — ANESTHESIA (OUTPATIENT)
Dept: SURGERY | Age: 56
End: 2021-08-06
Payer: MEDICARE

## 2021-08-06 ENCOUNTER — HOSPITAL ENCOUNTER (OUTPATIENT)
Age: 56
Setting detail: OUTPATIENT SURGERY
Discharge: HOME | End: 2021-08-06
Attending: ORTHOPAEDIC SURGERY | Admitting: ORTHOPAEDIC SURGERY
Payer: MEDICARE

## 2021-08-06 ENCOUNTER — APPOINTMENT (OUTPATIENT)
Dept: GENERAL RADIOLOGY | Age: 56
End: 2021-08-06
Attending: ORTHOPAEDIC SURGERY
Payer: MEDICARE

## 2021-08-06 VITALS
RESPIRATION RATE: 10 BRPM | DIASTOLIC BLOOD PRESSURE: 62 MMHG | HEART RATE: 72 BPM | SYSTOLIC BLOOD PRESSURE: 107 MMHG | TEMPERATURE: 98.5 F | WEIGHT: 102 LBS | HEIGHT: 63 IN | BODY MASS INDEX: 18.07 KG/M2 | OXYGEN SATURATION: 95 %

## 2021-08-06 DIAGNOSIS — Q70.22 FUSION OF TOES OF LEFT FOOT: Primary | ICD-10-CM

## 2021-08-06 PROCEDURE — 77030010512 HC APPL CLP LIG J&J -C: Performed by: ORTHOPAEDIC SURGERY

## 2021-08-06 PROCEDURE — 01480 ANES OPEN PX LOWER L/A/F NOS: CPT | Performed by: NURSE ANESTHETIST, CERTIFIED REGISTERED

## 2021-08-06 PROCEDURE — 77030006773 HC BLD SAW OSC BRSM -A: Performed by: ORTHOPAEDIC SURGERY

## 2021-08-06 PROCEDURE — 74011000258 HC RX REV CODE- 258: Performed by: NURSE ANESTHETIST, CERTIFIED REGISTERED

## 2021-08-06 PROCEDURE — 74011250636 HC RX REV CODE- 250/636: Performed by: NURSE ANESTHETIST, CERTIFIED REGISTERED

## 2021-08-06 PROCEDURE — 76060000037 HC ANESTHESIA 3 TO 3.5 HR: Performed by: ORTHOPAEDIC SURGERY

## 2021-08-06 PROCEDURE — 76010000133 HC OR TIME 3 TO 3.5 HR: Performed by: ORTHOPAEDIC SURGERY

## 2021-08-06 PROCEDURE — 74011250636 HC RX REV CODE- 250/636: Performed by: PHYSICIAN ASSISTANT

## 2021-08-06 PROCEDURE — 77030002933 HC SUT MCRYL J&J -A: Performed by: ORTHOPAEDIC SURGERY

## 2021-08-06 PROCEDURE — 87075 CULTR BACTERIA EXCEPT BLOOD: CPT

## 2021-08-06 PROCEDURE — 77030008683 HC TU ET CUF COVD -A: Performed by: ANESTHESIOLOGY

## 2021-08-06 PROCEDURE — 74011250637 HC RX REV CODE- 250/637: Performed by: NURSE ANESTHETIST, CERTIFIED REGISTERED

## 2021-08-06 PROCEDURE — 77030002916 HC SUT ETHLN J&J -A: Performed by: ORTHOPAEDIC SURGERY

## 2021-08-06 PROCEDURE — 74011000250 HC RX REV CODE- 250: Performed by: NURSE ANESTHETIST, CERTIFIED REGISTERED

## 2021-08-06 PROCEDURE — 77030040922 HC BLNKT HYPOTHRM STRY -A: Performed by: ORTHOPAEDIC SURGERY

## 2021-08-06 PROCEDURE — 74011000250 HC RX REV CODE- 250: Performed by: PHYSICIAN ASSISTANT

## 2021-08-06 PROCEDURE — 77030012425 HC DRN WND ZIMM -B: Performed by: ORTHOPAEDIC SURGERY

## 2021-08-06 PROCEDURE — 77030000032 HC CUF TRNQT ZIMM -B: Performed by: ORTHOPAEDIC SURGERY

## 2021-08-06 PROCEDURE — 01480 ANES OPEN PX LOWER L/A/F NOS: CPT | Performed by: ANESTHESIOLOGY

## 2021-08-06 PROCEDURE — 2709999900 HC NON-CHARGEABLE SUPPLY: Performed by: ORTHOPAEDIC SURGERY

## 2021-08-06 PROCEDURE — 28005 TREAT FOOT BONE LESION: CPT | Performed by: ORTHOPAEDIC SURGERY

## 2021-08-06 PROCEDURE — 76210000021 HC REC RM PH II 0.5 TO 1 HR: Performed by: ORTHOPAEDIC SURGERY

## 2021-08-06 PROCEDURE — 88304 TISSUE EXAM BY PATHOLOGIST: CPT

## 2021-08-06 PROCEDURE — 76210000017 HC OR PH I REC 1.5 TO 2 HR: Performed by: ORTHOPAEDIC SURGERY

## 2021-08-06 PROCEDURE — 77030026438 HC STYL ET INTUB CARD -A: Performed by: ANESTHESIOLOGY

## 2021-08-06 PROCEDURE — 20680 REMOVAL OF IMPLANT DEEP: CPT | Performed by: ORTHOPAEDIC SURGERY

## 2021-08-06 PROCEDURE — 77030040361 HC SLV COMPR DVT MDII -B: Performed by: ORTHOPAEDIC SURGERY

## 2021-08-06 PROCEDURE — 88311 DECALCIFY TISSUE: CPT

## 2021-08-06 PROCEDURE — 77030031139 HC SUT VCRL2 J&J -A: Performed by: ORTHOPAEDIC SURGERY

## 2021-08-06 PROCEDURE — 73620 X-RAY EXAM OF FOOT: CPT

## 2021-08-06 PROCEDURE — 87205 SMEAR GRAM STAIN: CPT

## 2021-08-06 PROCEDURE — 74011000250 HC RX REV CODE- 250: Performed by: ORTHOPAEDIC SURGERY

## 2021-08-06 PROCEDURE — 77030013079 HC BLNKT BAIR HGGR 3M -A: Performed by: ANESTHESIOLOGY

## 2021-08-06 RX ORDER — MIDAZOLAM HYDROCHLORIDE 1 MG/ML
INJECTION, SOLUTION INTRAMUSCULAR; INTRAVENOUS AS NEEDED
Status: DISCONTINUED | OUTPATIENT
Start: 2021-08-06 | End: 2021-08-06 | Stop reason: HOSPADM

## 2021-08-06 RX ORDER — SODIUM CHLORIDE 0.9 % (FLUSH) 0.9 %
5-40 SYRINGE (ML) INJECTION AS NEEDED
Status: DISCONTINUED | OUTPATIENT
Start: 2021-08-06 | End: 2021-08-06 | Stop reason: SDUPTHER

## 2021-08-06 RX ORDER — LIDOCAINE HYDROCHLORIDE 20 MG/ML
INJECTION, SOLUTION EPIDURAL; INFILTRATION; INTRACAUDAL; PERINEURAL AS NEEDED
Status: DISCONTINUED | OUTPATIENT
Start: 2021-08-06 | End: 2021-08-06 | Stop reason: HOSPADM

## 2021-08-06 RX ORDER — HYDROMORPHONE HYDROCHLORIDE 2 MG/ML
0.5 INJECTION, SOLUTION INTRAMUSCULAR; INTRAVENOUS; SUBCUTANEOUS
Status: DISCONTINUED | OUTPATIENT
Start: 2021-08-06 | End: 2021-08-06 | Stop reason: HOSPADM

## 2021-08-06 RX ORDER — SODIUM CHLORIDE, SODIUM LACTATE, POTASSIUM CHLORIDE, CALCIUM CHLORIDE 600; 310; 30; 20 MG/100ML; MG/100ML; MG/100ML; MG/100ML
100 INJECTION, SOLUTION INTRAVENOUS CONTINUOUS
Status: DISCONTINUED | OUTPATIENT
Start: 2021-08-06 | End: 2021-08-06 | Stop reason: HOSPADM

## 2021-08-06 RX ORDER — ROPIVACAINE HYDROCHLORIDE 2 MG/ML
30 INJECTION, SOLUTION EPIDURAL; INFILTRATION; PERINEURAL
Status: DISCONTINUED | OUTPATIENT
Start: 2021-08-06 | End: 2021-08-06 | Stop reason: HOSPADM

## 2021-08-06 RX ORDER — FENTANYL CITRATE 50 UG/ML
50 INJECTION, SOLUTION INTRAMUSCULAR; INTRAVENOUS
Status: DISCONTINUED | OUTPATIENT
Start: 2021-08-06 | End: 2021-08-06 | Stop reason: HOSPADM

## 2021-08-06 RX ORDER — SODIUM CHLORIDE 0.9 % (FLUSH) 0.9 %
5-40 SYRINGE (ML) INJECTION EVERY 8 HOURS
Status: DISCONTINUED | OUTPATIENT
Start: 2021-08-06 | End: 2021-08-06 | Stop reason: HOSPADM

## 2021-08-06 RX ORDER — SODIUM CHLORIDE, SODIUM LACTATE, POTASSIUM CHLORIDE, CALCIUM CHLORIDE 600; 310; 30; 20 MG/100ML; MG/100ML; MG/100ML; MG/100ML
25 INJECTION, SOLUTION INTRAVENOUS CONTINUOUS
Status: DISCONTINUED | OUTPATIENT
Start: 2021-08-06 | End: 2021-08-06 | Stop reason: HOSPADM

## 2021-08-06 RX ORDER — SODIUM CHLORIDE 0.9 % (FLUSH) 0.9 %
5-40 SYRINGE (ML) INJECTION AS NEEDED
Status: DISCONTINUED | OUTPATIENT
Start: 2021-08-06 | End: 2021-08-06 | Stop reason: HOSPADM

## 2021-08-06 RX ORDER — EPHEDRINE SULFATE/0.9% NACL/PF 25 MG/5 ML
SYRINGE (ML) INTRAVENOUS AS NEEDED
Status: DISCONTINUED | OUTPATIENT
Start: 2021-08-06 | End: 2021-08-06 | Stop reason: HOSPADM

## 2021-08-06 RX ORDER — PHENYLEPHRINE HCL IN 0.9% NACL 1 MG/10 ML
SYRINGE (ML) INTRAVENOUS AS NEEDED
Status: DISCONTINUED | OUTPATIENT
Start: 2021-08-06 | End: 2021-08-06 | Stop reason: HOSPADM

## 2021-08-06 RX ORDER — PROPOFOL 10 MG/ML
INJECTION, EMULSION INTRAVENOUS AS NEEDED
Status: DISCONTINUED | OUTPATIENT
Start: 2021-08-06 | End: 2021-08-06 | Stop reason: HOSPADM

## 2021-08-06 RX ORDER — SUCCINYLCHOLINE CHLORIDE 20 MG/ML
INJECTION INTRAMUSCULAR; INTRAVENOUS AS NEEDED
Status: DISCONTINUED | OUTPATIENT
Start: 2021-08-06 | End: 2021-08-06 | Stop reason: HOSPADM

## 2021-08-06 RX ORDER — FAMOTIDINE 20 MG/1
20 TABLET, FILM COATED ORAL ONCE
Status: COMPLETED | OUTPATIENT
Start: 2021-08-06 | End: 2021-08-06

## 2021-08-06 RX ORDER — SODIUM CHLORIDE 0.9 % (FLUSH) 0.9 %
5-40 SYRINGE (ML) INJECTION EVERY 8 HOURS
Status: DISCONTINUED | OUTPATIENT
Start: 2021-08-06 | End: 2021-08-06 | Stop reason: SDUPTHER

## 2021-08-06 RX ORDER — CEPHALEXIN 500 MG/1
500 CAPSULE ORAL 4 TIMES DAILY
Qty: 20 CAPSULE | Refills: 0 | Status: SHIPPED | OUTPATIENT
Start: 2021-08-06 | End: 2021-08-11

## 2021-08-06 RX ORDER — DEXAMETHASONE SODIUM PHOSPHATE 4 MG/ML
INJECTION, SOLUTION INTRA-ARTICULAR; INTRALESIONAL; INTRAMUSCULAR; INTRAVENOUS; SOFT TISSUE AS NEEDED
Status: DISCONTINUED | OUTPATIENT
Start: 2021-08-06 | End: 2021-08-06 | Stop reason: HOSPADM

## 2021-08-06 RX ORDER — MIDAZOLAM HYDROCHLORIDE 1 MG/ML
2 INJECTION, SOLUTION INTRAMUSCULAR; INTRAVENOUS ONCE
Status: DISCONTINUED | OUTPATIENT
Start: 2021-08-06 | End: 2021-08-06 | Stop reason: HOSPADM

## 2021-08-06 RX ORDER — ONDANSETRON 2 MG/ML
INJECTION INTRAMUSCULAR; INTRAVENOUS AS NEEDED
Status: DISCONTINUED | OUTPATIENT
Start: 2021-08-06 | End: 2021-08-06 | Stop reason: HOSPADM

## 2021-08-06 RX ORDER — FENTANYL CITRATE 50 UG/ML
100 INJECTION, SOLUTION INTRAMUSCULAR; INTRAVENOUS ONCE
Status: DISCONTINUED | OUTPATIENT
Start: 2021-08-06 | End: 2021-08-06 | Stop reason: HOSPADM

## 2021-08-06 RX ORDER — BUPIVACAINE HYDROCHLORIDE 5 MG/ML
INJECTION, SOLUTION EPIDURAL; INTRACAUDAL AS NEEDED
Status: DISCONTINUED | OUTPATIENT
Start: 2021-08-06 | End: 2021-08-06 | Stop reason: HOSPADM

## 2021-08-06 RX ORDER — LIDOCAINE HYDROCHLORIDE 10 MG/ML
0.1 INJECTION, SOLUTION EPIDURAL; INFILTRATION; INTRACAUDAL; PERINEURAL AS NEEDED
Status: DISCONTINUED | OUTPATIENT
Start: 2021-08-06 | End: 2021-08-06 | Stop reason: HOSPADM

## 2021-08-06 RX ORDER — HYDROCODONE BITARTRATE AND ACETAMINOPHEN 10; 325 MG/1; MG/1
1 TABLET ORAL
Qty: 28 TABLET | Refills: 0 | Status: SHIPPED | OUTPATIENT
Start: 2021-08-06 | End: 2021-08-11 | Stop reason: SDUPTHER

## 2021-08-06 RX ORDER — FENTANYL CITRATE 50 UG/ML
INJECTION, SOLUTION INTRAMUSCULAR; INTRAVENOUS AS NEEDED
Status: DISCONTINUED | OUTPATIENT
Start: 2021-08-06 | End: 2021-08-06 | Stop reason: HOSPADM

## 2021-08-06 RX ADMIN — HYDROMORPHONE HYDROCHLORIDE 0.5 MG: 2 INJECTION, SOLUTION INTRAMUSCULAR; INTRAVENOUS; SUBCUTANEOUS at 16:25

## 2021-08-06 RX ADMIN — PROPOFOL 100 MG: 10 INJECTION, EMULSION INTRAVENOUS at 12:58

## 2021-08-06 RX ADMIN — SUCCINYLCHOLINE CHLORIDE 80 MG: 20 INJECTION, SOLUTION INTRAMUSCULAR; INTRAVENOUS at 12:58

## 2021-08-06 RX ADMIN — HYDROMORPHONE HYDROCHLORIDE 0.5 MG: 2 INJECTION, SOLUTION INTRAMUSCULAR; INTRAVENOUS; SUBCUTANEOUS at 16:15

## 2021-08-06 RX ADMIN — PROPOFOL 50 MG: 10 INJECTION, EMULSION INTRAVENOUS at 15:19

## 2021-08-06 RX ADMIN — FENTANYL CITRATE 50 MCG: 50 INJECTION, SOLUTION INTRAMUSCULAR; INTRAVENOUS at 16:50

## 2021-08-06 RX ADMIN — Medication 100 MCG: at 13:59

## 2021-08-06 RX ADMIN — FENTANYL CITRATE 50 MCG: 50 INJECTION, SOLUTION INTRAMUSCULAR; INTRAVENOUS at 14:41

## 2021-08-06 RX ADMIN — PROPOFOL 50 MG: 10 INJECTION, EMULSION INTRAVENOUS at 15:42

## 2021-08-06 RX ADMIN — FENTANYL CITRATE 50 MCG: 50 INJECTION, SOLUTION INTRAMUSCULAR; INTRAVENOUS at 12:57

## 2021-08-06 RX ADMIN — WATER 2 G: 1 INJECTION INTRAMUSCULAR; INTRAVENOUS; SUBCUTANEOUS at 13:05

## 2021-08-06 RX ADMIN — ONDANSETRON 4 MG: 2 INJECTION INTRAMUSCULAR; INTRAVENOUS at 14:44

## 2021-08-06 RX ADMIN — DEXMEDETOMIDINE HYDROCHLORIDE 4 MCG: 100 INJECTION, SOLUTION, CONCENTRATE INTRAVENOUS at 15:35

## 2021-08-06 RX ADMIN — DEXMEDETOMIDINE HYDROCHLORIDE 4 MCG: 100 INJECTION, SOLUTION, CONCENTRATE INTRAVENOUS at 14:43

## 2021-08-06 RX ADMIN — DEXAMETHASONE SODIUM PHOSPHATE 4 MG: 4 INJECTION, SOLUTION INTRAMUSCULAR; INTRAVENOUS at 13:17

## 2021-08-06 RX ADMIN — SODIUM CHLORIDE, SODIUM LACTATE, POTASSIUM CHLORIDE, AND CALCIUM CHLORIDE 25 ML/HR: 600; 310; 30; 20 INJECTION, SOLUTION INTRAVENOUS at 12:30

## 2021-08-06 RX ADMIN — DEXMEDETOMIDINE HYDROCHLORIDE 4 MCG: 100 INJECTION, SOLUTION, CONCENTRATE INTRAVENOUS at 15:17

## 2021-08-06 RX ADMIN — Medication 10 MG: at 13:19

## 2021-08-06 RX ADMIN — MIDAZOLAM HYDROCHLORIDE 2 MG: 2 INJECTION, SOLUTION INTRAMUSCULAR; INTRAVENOUS at 12:53

## 2021-08-06 RX ADMIN — FAMOTIDINE 20 MG: 20 TABLET ORAL at 12:31

## 2021-08-06 RX ADMIN — LIDOCAINE HYDROCHLORIDE 50 MG: 20 INJECTION, SOLUTION EPIDURAL; INFILTRATION; INTRACAUDAL; PERINEURAL at 12:57

## 2021-08-06 NOTE — DISCHARGE INSTRUCTIONS
ORTHOPAEDIC DISCHARGE PLAN     1. DISCHARGE DISPOSITION:  Home    2. FOLLOW UP RETURN TO OFFICE: 1 weeks    Call [phone] to confirm your appointment to see Dr. Rama Iverson. Joel Hogan MD.   Follow up with Primary Care Provider in 7 to 10 days. 3. WEIGHT BEARING STATUS/ROM:    NO WEIGHT BEARING TO   the Left LOWER EXTREMITY    4. ELEVATE the Left lower extremity on 1 pillow. Place the pillow horizontal so that no pressure is on the back of your heel    Please leave your current dressings and in place. Keep your dressings clean and dry to the: Left lower extremity      Please call [phone] (3 [facility]) if any: fever, shakes, chills, intractable pain, or for any questions you have regarding your care/medical condition   1. If you experience any calf pain or swelling, or are having any shortness of  breath, chest pain, or extremity swelling, or bleeding thru any surgical dressings,  or Bleeding at any body location while you are taking on any blood thinners. ie (mouth,nose, skin sites:)   2. Please go to closest ER  ASAP for assessment to rule out a leg clot and to  assess any  bleeding. 3. After general anesthesia or intravenous sedation, for 24 hours or while taking  prescription Narcotics:      [x]  Limit your activities     [x]   Do not drive and operate hazardous machinery    [x]   Do not make important personal or business decisions    [x]   Do  not drink alcoholic beverages    [x]  If you have not urinated within 8 hours after discharge, please contact    your surgeon on call.      Report the following to your surgeon: If any below is true         [x]   Excessive pain, swelling, redness or odor of or around the surgical area       [x]   Temperature over 100.5       [x]  Nausea and vomiting lasting longer than 4 hours or if unable to take medications       [x]    Any signs of decreased circulation or nerve impairment to extremity:    Change in color, persistent  numbness, tingling, coldness or increase pain          [x]  No smoking/ No tobacco products/ Avoid exposure to second hand smoke    5. DIET:  Regular Diet  OTC (Nutritional supplements/multivitamins/calcium w/ Vitamin  D     6. ACTIVITY:   // No lifting, twisting, squatting, deep bending. 7. INCISION CARE/DRESSINGS: Reinforce dressing PRN ,ACE Wraps    Keep all pets away from  any wound present in order to prevent infection. 8. PAIN CONTROL: Prescriptions written: Yaniv Crank 10 MG // JK EQSS TO Marquis Grady and Keflex prescription was sent to pharmacy, at [facility] pharmacy outpatient: I did call down to them so they can fill this prescription for this family. I will keep her on Keflex 500 g, 1 p.o. 4 times daily, for prophylaxis  X   5 days//        Start taking your pain medications when you get home    9. VTE prophylaxis : Start Aspirin  81 MG ONE PO EACH DAY    10. ANTIBIOTICS: Keflex 500 mg one po QID  For 5 days    11. DME/ PRESCRIPTIONS WRITTEN ALREADY WRITTEN:          Raysa Mera MD  8/6/2021  12:37 PM  DISCHARGE SUMMARY from Nurse    PATIENT INSTRUCTIONS:    After general anesthesia or intravenous sedation, for 24 hours or while taking prescription Narcotics:  · Limit your activities  · Do not drive and operate hazardous machinery  · Do not make important personal or business decisions  · Do  not drink alcoholic beverages  · If you have not urinated within 8 hours after discharge, please contact your surgeon on call.     Report the following to your surgeon:  · Excessive pain, swelling, redness or odor of or around the surgical area  · Temperature over 100.5  · Nausea and vomiting lasting longer than 4 hours or if unable to take medications  · Any signs of decreased circulation or nerve impairment to extremity: change in color, persistent  numbness, tingling, coldness or increase pain  · Any questions    What to do at Home:  Recommended activity: Activity as tolerated and no driving for today    *  Please give a list of your current medications to your Primary Care Provider. *  Please update this list whenever your medications are discontinued, doses are      changed, or new medications (including over-the-counter products) are added. *  Please carry medication information at all times in case of emergency situations. These are general instructions for a healthy lifestyle:    No smoking/ No tobacco products/ Avoid exposure to second hand smoke  Surgeon General's Warning:  Quitting smoking now greatly reduces serious risk to your health. Obesity, smoking, and sedentary lifestyle greatly increases your risk for illness    A healthy diet, regular physical exercise & weight monitoring are important for maintaining a healthy lifestyle    You may be retaining fluid if you have a history of heart failure or if you experience any of the following symptoms:  Weight gain of 3 pounds or more overnight or 5 pounds in a week, increased swelling in our hands or feet or shortness of breath while lying flat in bed. Please call your doctor as soon as you notice any of these symptoms; do not wait until your next office visit. The discharge information has been reviewed with the patient and parent. The patient and parent verbalized understanding. Discharge medications reviewed with the patient and guardian and appropriate educational materials and side effects teaching were provided.   ___________________________________________________________________________________________________________________________________

## 2021-08-06 NOTE — OP NOTES
Patient: Camilo Whitehead                MRN:@           N: [id number]   YOB: 1965         AGE: 61 y.o. 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 Procedure(s):  LEFT GREAT TOE INTERPHALANGEAL REGION IRRIGATION AND DEBRIDEMENT: Bone from the distal end of the proximal phalanx, sent for histopathologic analysis, in order to rule out chronic osteomyelitis. [id number]  INTRAOPERATIVE CULTURES   REMOVAL OF DEEP HARDWARE  [id number]: Ortho helix dorsal plate, IP headless screw. C-ARM/LONG FLOURO TABLE/ACCUTRAK SCREWDRIVER TRAY, PARAGON DORSAL PLATE AND SCREWS/REGIONAL BLOCK    Surgeon(s):  Palak Sandra MD    Surgical Assistant: Physician Assistant: (Unknown)  Surg Asst-1: (Unknown)    Anesthesia: General and Marcaine 0.5% plain, 16 mL used    Estimated Blood Loss (mL):  25 ml EBL  IV FLUIDS:  1100 ml IVF  Tourniquet Time:  41 minutes at  802  Mm HG     Complications: None    Specimens: * No specimens in log *     Implants: * No surgical log found *    Drains: * No LDAs found *    Findings: Very poor bone quality, at this great toe IP joint, shallow bone in the distal phalanx, no significant cancellous bone at all. Fibrous tissue, at the IP region, nonunion site. Electronically Signed by Shayla Lewis MD on 8/5/2021 at 2:42 PM    Clinical indications: Patient 58-year-old female, who in March 2021, underwent a left great toe IP joint arthrodesis. 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   She has a solid great toe first MTP joint arthrodesis, from a prior surgery, that was done approxi-6 years ago. She has a Ortho helix dorsal plate, and AccuTrack interfragmentary screw, that was used for the MTP joint arthrodesis. For her left great toe IP joint arthrodesis procedure, there was then in March 2021, she has a AccuTrack mini screw. I had a lengthy discussion with her prior to her surgery. She did have blood work, specifically a CBC with differential, ESR, CRP. The studies were normal.  Her white count was approxithree 0.5, the differential was normal.  Her ESR and CRP were well normal.  She does have a history of rheumatoid arthritis, and is immunosuppressed, does take immunosuppressive agents. Recommendation, is for exploration of the left great toe IP joint, removal hardware, send this tissue for histopathologic analysis to rule out osteomyelitis, and see if there is enough bone to perform a revision IP joint arthrodesis. I also talked about possibly removing hardware, as well, as I would need sufficient space on the metatarsal and the proximal phalanx, if I were to have to perform a revision IP joint arthrodesis with dorsal plating and interfragmentary screws. The risk of surgery were explained to her these included but were not limited to the following: Bleeding, infection, DVT, PE, death, RSD, paresthesia numbness, Subcutaneous cytoma, mild surgical scar, possible delayed healing nonhealing, possible wound complications, she voiced understand there is a surgeon informed decision was made to proceed. She understands that should she have severe infection, she may require additional surgeries, and antibiotic beads, PICC line, and or possible need to go a toe amputation if she ends up having a severe infection recalcitrant to all surgical measures. A shared inform decision was made to proceed.       OPERATIVE NOTE    Geronimo Lees was taken to the operating room suite 8/6/2021 and general anesthesia was obtained, the patient was positioned in a supine position and a was placed under the left ipsilateral hip in order to gently internally rotate the lower extremity. The left extremity was prepped and draped in the usual sterile fashion using a follow solution: Chlorhexidene soap and yellow prep sticks (2) . A regional block was not performed prior to taking the patient to the OR. A formal verbal  time out was conducted: identifying the patient, identifying the surgical location, reading the informed written signed consent for procedure, confirmation that  the patient did receive preoperative antibiotics and that my signature on the patient was visible at the surgical field. All members of the surgical team agreed to the consent process. The  left ipsilateral lower extremity was then exsanguinated with an Esmarch and a tourniquet was insufflated to 300 mmHg and attention was then directed towards surgery. I made a long incision over the left great toe over her prior surgical midline well-healed dorsal scar. Incision was made through skin subcutaneous tissues directly down to the extensor mechanism. I then made a linear incision, directly throughout the length of my surgical session, parallel to the extensor houses longus tendon such that my incision went from the distal phalanx to the proximal phalanx and towards the proximal portion of the MTP region. This allowed me to expose my plate, as well as my IP region. The IP region, had fibrous tissue, and I took cultures of this fibrous tissue and slight fluid moisture at this fibrous tissue. There is no pus. I used a sagittal saw to remove a small wafer of bone from the distal end of the proximal phalanx and sent this to pathology for histopathologic analysis to rule any signs of chronic osteomyelitis. I thoroughly irrigated this IP region.   The great toe distal phalanx was basically a shell of bone there is no appreciable cancellous bone within this distal phalanx and as such I cannot use this bone to try to gain a revision arthrodesis when there is no cancellous bone or healthy cancellous bone in this region. I did curette out bone from the distal phalanx, and from the proximal phalanx region, and also sent this in the same specimen for cultures. As such, sharp excisional debridement of subtendinous fat, fibrous tissue, and bone was conducted, using a sagittal saw and rongeur [id number]. After thorough irrigation amount throughout my surgical field, the tourniquet was released. I remove the Ortho helix plate. The AccuTrack screw, was embedded in bone and I removed some of the bone around the head of the screw, but the screw could not be safely removed. Throughout this case, there is no gross signs of any infection. After thorough irrigation was conducted at the surgical field once again, and after selective electrocautery used for hemostasis. The extensor mechanism, with Extensor Mechanism Was Closed with 3-0 PDS in a Figure-Of-Eight Interrupted Knot Tied Fashion. The Subcu Tissues Were Then Closed in Layered Fashion Using 3-0 Monocryl. The Skin Was Then Closed with 3-0 Nylon Prior to Closure Correct Needle Count Tape Counts and Instruments Were Noted Document Chart Sterile Dressings Were Placed Which Is of Xeroform 4 X 4's ABDs and a Carefully Molded Posterior Splint Was Applied to This Left Lower Extremity with Ankle in Neutral Position. I did meet with the patient's mom in the recovery room, explained to her that surgery had gone well, but because of the poor bone quality of the distal phalanx, I cannot perform a revision IP joint arthrodesis. Anticipate her using a graphite insert with Lemos's extension, in the postoperative period, for least the 12 to 16 months.        Nicole Lakhani MD  8/6/2021  4:13 PM

## 2021-08-06 NOTE — ANESTHESIA PREPROCEDURE EVALUATION
Relevant Problems   No relevant active problems       Anesthetic History               Review of Systems / Medical History  Patient summary reviewed and pertinent labs reviewed    Pulmonary  Within defined limits                 Neuro/Psych         Psychiatric history     Cardiovascular    Hypertension                   GI/Hepatic/Renal     GERD      PUD     Endo/Other        Arthritis     Other Findings              Physical Exam    Airway  Mallampati: II  TM Distance: 4 - 6 cm  Neck ROM: normal range of motion   Mouth opening: Normal     Cardiovascular    Rhythm: regular  Rate: normal         Dental    Dentition: Poor dentition     Pulmonary  Breath sounds clear to auscultation               Abdominal  GI exam deferred       Other Findings            Anesthetic Plan    ASA: 2  Anesthesia type: general          Induction: Intravenous  Anesthetic plan and risks discussed with: Patient

## 2021-08-07 NOTE — ANESTHESIA POSTPROCEDURE EVALUATION
Procedure(s):  LEFT GREAT TOE INTERPHALANGEAL REGION IRRIGATION AND DEBRIDEMENT/POSSIBLE REVISION ARTHRODESIS/C-ARM/LONG FLOURO TABLE/ACCUTRAK SCREWDRIVER TRAY, PARAGON DORSAL PLATE AND SCREWS/REGIONAL BLOCK.    general    Anesthesia Post Evaluation      Multimodal analgesia: multimodal analgesia used between 6 hours prior to anesthesia start to PACU discharge  Patient location during evaluation: bedside  Patient participation: complete - patient participated  Level of consciousness: awake  Pain management: adequate  Airway patency: patent  Anesthetic complications: no  Cardiovascular status: stable  Respiratory status: acceptable  Hydration status: acceptable  Post anesthesia nausea and vomiting:  controlled      INITIAL Post-op Vital signs:   Vitals Value Taken Time   BP 99/64 08/06/21 1726   Temp 36.9 °C (98.5 °F) 08/06/21 1556   Pulse 73 08/06/21 1729   Resp 14 08/06/21 1729   SpO2 98 % 08/06/21 1729   Vitals shown include unvalidated device data.

## 2021-08-10 LAB
BACTERIA SPEC CULT: ABNORMAL
BACTERIA SPEC CULT: NORMAL
GRAM STN SPEC: ABNORMAL
GRAM STN SPEC: ABNORMAL
SERVICE CMNT-IMP: ABNORMAL
SERVICE CMNT-IMP: NORMAL

## 2021-08-11 ENCOUNTER — OFFICE VISIT (OUTPATIENT)
Dept: ORTHOPEDIC SURGERY | Age: 56
End: 2021-08-11
Payer: MEDICARE

## 2021-08-11 VITALS — BODY MASS INDEX: 18.07 KG/M2 | HEIGHT: 63 IN | HEART RATE: 77 BPM | OXYGEN SATURATION: 100 %

## 2021-08-11 DIAGNOSIS — Z98.890 POST-OPERATIVE STATE: ICD-10-CM

## 2021-08-11 DIAGNOSIS — M79.672 LEFT FOOT PAIN: ICD-10-CM

## 2021-08-11 DIAGNOSIS — M96.89 NONUNION OF OSTEOTOMY SITE: Primary | ICD-10-CM

## 2021-08-11 DIAGNOSIS — Q70.22 FUSION OF TOES OF LEFT FOOT: ICD-10-CM

## 2021-08-11 PROCEDURE — 73630 X-RAY EXAM OF FOOT: CPT | Performed by: PHYSICIAN ASSISTANT

## 2021-08-11 PROCEDURE — 99024 POSTOP FOLLOW-UP VISIT: CPT | Performed by: PHYSICIAN ASSISTANT

## 2021-08-11 RX ORDER — HYDROCODONE BITARTRATE AND ACETAMINOPHEN 10; 325 MG/1; MG/1
1 TABLET ORAL
Qty: 28 TABLET | Refills: 0 | Status: SHIPPED | OUTPATIENT
Start: 2021-08-11 | End: 2022-01-10

## 2021-08-11 NOTE — PROGRESS NOTES
Herminio Vidales (1965) is a 61 y.o. female, established patient, 5 days s/p Left Great Toe Interphalangeal Region Irrigation And Debridement/possible Revision Arthrodesis/c-arm/long Flouro Table/accutrak Screwdriver Tray, [place] Dorsal Plate And Screws/regional Block - Left completed on 8/6/2021, and she is here for evaluation of the following chief complaint(s):    Chief Complaint   Patient presents with    Toe Pain     left great toe pain          ASSESSMENT & PLAN:     Diagnoses and all orders for this visit:    1. Nonunion of osteotomy site    2. Left foot pain  -     AMB POC XRAY, FOOT; COMPLETE, 3+ VIEW    3. Fusion of toes of left foot  -     HYDROcodone-acetaminophen (Norco)  mg tablet; Take 1 Tablet by mouth every six (6) hours as needed for Pain for up to 7 days. Max Daily Amount: 4 Tablets. 4. Post-operative state           ICD-10-CM ICD-9-CM   1. Nonunion of osteotomy site  M96.89 998.89   2. Left foot pain  M79.672 729.5   3. Fusion of toes of left foot  Q70.22 755.13   4. Post-operative state  Z98.890 V45.89       There are no Patient Instructions on file for this visit. Dr. Vivien Rudd has been consulted regarding the patient during this visit and he agrees with the assessment and plan    Patient expresses understanding of the diagnosis and treatment plan. Patient education has been provided. Herminio Vidales may have a reminder for a \"due or due soon\" health maintenance. I have asked that she contact her primary care provider for follow-up on this health maintenance.  was reviewed by Rebeka Mclain PA-C on 8/11/2021. SUBJECTIVE & OBJECTIVE:     HPI    Since last seen in the office, the patient reports that she is doing okay. Patient is currently taking Keflex. We are awaiting final surgical cultures. Patient denies any fever, chills, CP, SOB or calf pain. The patient denies any new illness or injuries to report since last seen in the office.  There are no reported changes in medications, allergies, social or family history. Marcela Olivera has been experiencing pain, discomfort and associated symptoms and has tried modalities of treatment and/or self treatment confirmed as outlined in the pain assessment below. Pain Assessment  8/11/2021   Location of Pain Toe   Pain Location Comment -   Location Modifiers Left   Severity of Pain 5   Quality of Pain Throbbing   Quality of Pain Comment -   Duration of Pain Persistent   Frequency of Pain Constant   Frequency of Pain Comment -   Date Pain First Started -   Aggravating Factors Exercise;Walking;Stairs;Standing   Aggravating Factors Comment -   Limiting Behavior -   Relieving Factors Elevation; Other (Comment)   Relieving Factors Comment pain medication   Result of Injury No          Marcela Olivera  has a past medical history of Anemia, Chronic pain, Contact lens/glasses fitting, Finger pain, left, Frequent UTI (2006), GERD (gastroesophageal reflux disease), H/O eating disorder, Hip fracture, left ([facility] [address] (2000), History of hormone replacement therapy, Hypertension, Leg length discrepancy, Migraines, neuralgic, Needs pre-anesthesia assessment, Post herpetic neuralgia, Psychiatric disorder, PUD (peptic ulcer disease), Raynaud's disease, Rheumatoid arthritis ([facility] Rheumatoid arthritis [facility]), Shingles (April 2014), and Tooth abscess (10/6/15). Other than previously noted, patient reports no changes in medications, allergies, social or family history. REVIEW OF SYSTEMS:                  All Below are Negative except as indicated in the HPI: See HPI                Constitutional: negative for fever, chills, night sweats and unexpected weight loss and malaise/fatigue              HEENT: Negative. No hoarseness, no double vision              Respiratory: Negative.  No difficulty breathing, No SOB              Cardiovascular: negative for chest pain, claudication, HOFFMANN. (-) Leg swelling               Gastrointestinal: Negative for pain, Blood in stool, incontinence, pelvic pain, N/V/C/D              Genitourinary: No saddle anesthesia, pelvic pain, blood in urine, incontinence, dysuria               Neurological: Negative for dizziness and weakness, visual changes, confusion, headaches, seizures               Phychiatric/Behavioral: Negative for depression, memory loss, substance abuse               Extremities: Negative for hair changes, rash, or skin lesion changes              Hematologic: Negative for bleeding problems, bruising, pallor or swollen lymph nodes              Peripheral Vascular: No calf pain, no circulation deficits              Musculoskeletal: As per HPI above      PHYSICAL EXAM:        Visit Vitals  Pulse 77   Ht 5' 3\" (1.6 m)   SpO2 100%   BMI 18.07 kg/m²         Constitutional: [x] Alert, cooperative, appears well-developed and well-nourished [x] No apparent distress      [] Abnormal - Body mass index is 18.07 kg/m². makes the treatment plan more complex     Mental status: [x] Alert and awake  [x] Oriented to person/place/time   [x] Normal mood/behavior/speech   [x] Normal dress/motor activity/thought processes/memory      [x] Able to follow commands    [] Abnormal - Dementia    Eyes:   EOM    [x]  Normal    [] Abnormal -   Sclera  [x]  Normal    [] Abnormal -          Discharge [x]  None visible   [] Abnormal -     HENT: [x] Normocephalic, atraumatic  [] Abnormal -   [x] Mouth/Throat: Mucous membranes are moist    External Ears [x] Normal, hearing intact  [] Abnormal -    Neck: [x] Supple.  No visualized mass, normal ROM [] Abnormal -     Pulmonary/Chest: [x] Respiratory effort normal   [x] No visualized signs of difficulty breathing or respiratory distress        [] Abnormal -        Cardiovascular/    [x] Normal pulses to each foot  [] Abnormal -   Peripheral Vascular:    Neurological:        [x] No Facial Asymmetry (Cranial nerve 7 motor function) (limited exam due to video visit) [x] No gross defects         [x] No gaze palsy        [] Abnormal -          Skin:        [x] No significant exanthematous lesions or discoloration noted on facial skin or visible areas       [] Abnormal -            Psychiatric:       [x] Normal Affect, mood, judgement, behavior, conduct [] Abnormal -        [x] No Hallucinations      Musculoskeletal:   [x] Normal gait with no signs of ataxia         [x] Normal range of motion of neck        [] Abnormal -       MUSCULOSKELETAL: EXAMINATION OF:     ANKLE/FOOT left    DRESSINGS: CDI   DRAINAGE: none   INCISION: Incision looks good, skin well approximated, no dehiscence, nylon sutures in place without disruption. SKIN: Mild edema, no erythema, no ecchymosis, no warmth. No rash or skin lesions. No signs of infection or cellulitis present. TENDERNESS:  Mild tenderness to palpation   NEUROVASCULAR:  Grossly intact. Motor/Sensory: NL/NL. Positive distal pulses and capillary refill. DVT ASSESSMENT:  The calf is not tender to palpation. No evidence of DVT seen on physical exam.  LYMPHATICS: No extremity lymphedema, No calf swelling, no tenderness to calf muscles  JOINT MOTION: Not tested . There is pain-free range of motion of adjacent joints. Negative joint effusion  JOINT/TENDON STABILITY: No Ankle or Subtalar instability or joint laxity. No peroneal sublux ability or dislocation  ALIGNMENT: Forefoot, Midfoot, Hindfoot WNL. DEFORMITY: none present         An electronic signature was used to authenticate this note. -- Cinthia Marroquin. Mo [facility], MAURO, PA-C  8/11/2021      Disclaimer: Sections of this note may have been dictated utilizing voice recognition software, which may have resulted in some phonetic based errors in grammar and contents. Even though attempts were made to correct all the mistakes, some may have been missed, and remained in the body of the document. If questions arise, please contact our department.        RADIOGRAPHS & DIAGNOSTIC STUDIES         Left foot X-rays, 3 views, AP/LAT/OBL completed 8/11/2021 AT [facility]      FINDINGS:    X-rays reveal post op changes s/p Left Great Toe Interphalangeal Region Irrigation And Debridement/possible Revision Arthrodesis/c-arm/long Flouro Table/accutrak Screwdriver Tray, [place] Dorsal Plate And Screws/regional Block - Left. Overall alignment is acceptable. Hardware is in good position with no indication of movement or failure. Soft tissue swelling is mild. Degenerative changes are noted. Mineralization suggests osteopenia. Calcified vessels are not present. IMPRESSION:    As stated above      I have personally reviewed the results of the above study.  The interpretation of this study is my professional opinion    8/11/2021  Stanton Dejesus PA-C

## 2021-08-13 DIAGNOSIS — M96.89 NONUNION OF OSTEOTOMY SITE: Primary | ICD-10-CM

## 2021-08-13 RX ORDER — DOXYCYCLINE 100 MG/1
100 CAPSULE ORAL 2 TIMES DAILY
Qty: 84 CAPSULE | Refills: 0 | Status: SHIPPED | OUTPATIENT
Start: 2021-08-13 | End: 2022-01-07

## 2021-08-17 NOTE — PROGRESS NOTES
AMBULATORY PROGRESS NOTE      Patient: Yanelis Bueno             MRN: [id number]     SSN: [id number] Body mass index is 18.66 kg/m². YOB: 1965     AGE: 61 y.o. EX: female    PCP: Mazin Fisher MD       IMPRESSION //  DIAGNOSIS AND TREATMENT PLAN        Yanelis Bueno has a diagnosis of:      She admits to having a 5 out of 10 pain, denies any fever shakes chills night sweats. Her dressings were removed, incision line looks very good there is no undue redness erythema drainage or any signs infection. The toe, still has some swelling, distal phalanx. I spoke with her, informed her, that she should continue her doxycycline 100 mg 1 p.o. twice daily, for 6 weeks. She has been on this. I did speak with Dr. Joy Lakhani, last week, as this patient is an immunocompromised host, and had some light growth of coag negative staph. Continue follow-up, closely is recommended for her. Only should she had an infection, that is recalcitrant to all Surgical and Medical Management, // that would be an indication for amputation,, but not at this time. Toe clinically looks like good actually. DIAGNOSES    1. Nonunion of osteotomy site    2. Foot infection        No orders of the defined types were placed in this encounter. PLAN:    1. Remove old dressings and applied new dressings      RTO-  2 weeks    Yanelis Bueno  expresses understanding of the diagnosis, treatment plan, and all of their proposed questions were answered to their satisfaction. Patient education has been provided re the diagnoses.          HPI //  Brian 12 A 61 y.o. female who is a/an  established patient, presenting to my outpatient office for evaluation of  the following chief complaint(s):     Chief Complaint   Patient presents with    Surgical Follow-up     big toe on left foot       Patient presents today for post-op of LEFT GREAT TOE INTERPHALANGEAL REGION IRRIGATION AND DEBRIDEMENT AND REMOVAL OF DEEP HARDWARE performed on 08/06/2021. She was prescribed Doxycycline 100 mg  1 po BID for 6 weeks. Visit Vitals  Temp 98.6 °F (37 °C) (Temporal)   Resp 15   Ht 5' 2\" (1.575 m)   BMI 18.66 kg/m²       Appearance: Alert, well appearing and pleasant patient who is in no distress, oriented to person, place/time, and who follows commands. This patient is accompanied in the examination room by her  self. There is signs of: no dementia  Psychiatric: Affect/mood are appropriate. Speech normal in context and clarity, memory intact grossly, no involuntary movements - tremors. Patient arrives to office via: with assistive device: left hard sole shoe  H EENT (2): Head normocephalic & atraumatic. Eye: pupils are round// EOM are intact // Neck: ROM WNL  // Hearings Intact   Respiratory: Breathing non labored     ANKLE/FOOT left    Gait: uses assistive device   Tenderness: no     Cutaneous:   Slightly swelling, great toe distal phalanx  Joint Motion: Motion of the MTP, shows a solid arthrodesis. The IP, has some valgus alignment, has some mild motion, has a nonunion in this region. Great toe left  Joint / Tendon Stability: No Ankle or Subtalar instability or joint laxity. No peroneal sublux ability or dislocation  Alignment: neutral Hindfoot,    Neuro Motor/Sensory: NL/NL  Vascular: NL foot/ankle pulses,   Lymphatics: No extremity lymphedema, No calf swelling, no tenderness to calf muscles. CHART REVIEW     Agnes Mills has been experiencing pain and discomfort confirmed as outlined in the pain assessment outlined below.  was reviewed by Klarissa Diaz MD on 8/18/2021.      Pain Assessment  8/18/2021   Location of Pain Foot   Pain Location Comment -   Location Modifiers Left   Severity of Pain 5   Quality of Pain Throbbing   Quality of Pain Comment -   Duration of Pain -   Frequency of Pain Intermittent   Frequency of Pain Comment - Date Pain First Started -   Aggravating Factors -   Aggravating Factors Comment -   Limiting Behavior -   Relieving Factors -   Relieving Factors Comment -   Result of Injury -        Karrie Velez  has a past medical history of Anemia, Chronic pain, Contact lens/glasses fitting, Finger pain, left, Frequent UTI (2006), GERD (gastroesophageal reflux disease), H/O eating disorder, Hip fracture, left ([address] (2000), History of hormone replacement therapy, Hypertension, Leg length discrepancy, Migraines, neuralgic, Needs pre-anesthesia assessment, Post herpetic neuralgia, Psychiatric disorder, PUD (peptic ulcer disease), Raynaud's disease, Rheumatoid arthritis ([address] Rheumatoid arthritis [facility]), Shingles (April 2014), and Tooth abscess (10/6/15).      Patients is employed at:         Past Medical History:   Diagnosis Date    Anemia     Chronic pain     Contact lens/glasses fitting     Finger pain, left     Frequent UTI 2006    GERD (gastroesophageal reflux disease)     H/O eating disorder     remote    Hip fracture, left ([address] 2000    History of hormone replacement therapy     Hypertension     Leg length discrepancy     Migraines, neuralgic     Needs pre-anesthesia assessment     Patient reports a history of high tolerance to pain medicine and prior hisoty of drug use    Post herpetic neuralgia     Psychiatric disorder     PUD (peptic ulcer disease)     Raynaud's disease     Rheumatoid arthritis ([address]     Rheumatoid arthritis ([address]     Shingles April 2014    Tooth abscess 10/6/15    Completed Amoxicillin      Past Surgical History:   Procedure Laterality Date    FOOT/TOES SURGERY PROC UNLISTED      HX COLONOSCOPY      HX GYN  2012    fibroids    HX ORTHOPAEDIC Left 2012    partial hip replacement    HX ORTHOPAEDIC  2009    attempt bunionectomy    HX ORTHOPAEDIC  10/2015    Dr. Brittany Mary: First Metatarsophylangeal Fusion,etc    HX ORTHOPAEDIC  2014    redo left hip replacement    NEUROLOGICAL PROCEDURE UNLISTED Bilateral 2016    Thalamotomy      Current Outpatient Medications   Medication Sig    doxycycline (MONODOX) 100 mg capsule Take 1 Capsule by mouth two (2) times a day.  HYDROcodone-acetaminophen (Norco)  mg tablet Take 1 Tablet by mouth every six (6) hours as needed for Pain for up to 7 days. Max Daily Amount: 4 Tablets.  amoxicillin-clavulanate (AUGMENTIN) 500-125 mg per tablet Take 1 Tablet by mouth two (2) times a day.  polyethylene glycol (Miralax) 17 gram packet Take 1 Packet by mouth daily. (Patient taking differently: Take 17 g by mouth as needed.)    levETIRAcetam (Keppra) 1,000 mg tablet Take 1,500 mg by mouth daily. Indications: Neuralgia    gabapentin (NEURONTIN) 600 mg tablet Take 600 mg by mouth three (3) times daily.  methotrexate (RHEUMATREX) 2.5 mg tablet Take 15 mg by mouth every Sunday.  folic acid (FOLVITE) 1 mg tablet Take  by mouth daily.  hydrOXYchloroQUINE (PLAQUENIL) 200 mg tablet Take 200 mg by mouth two (2) times a day.  azaTHIOprine (Imuran) 50 mg tablet Take 50 mg by mouth three (3) times daily.  prochlorperazine (COMPAZINE) 10 mg tablet Take 5 mg by mouth every six (6) hours as needed.  dextroamphetamine-amphetamine (ADDERALL) 10 mg tablet Take 10 mg by mouth two (2) times a day.  0.9% sodium chloride solution     medroxyPROGESTERone (PROVERA) 5 mg tablet Take 5 mg by mouth daily.  imipramine (TOFRANIL) 25 mg tablet Take 300 mg by mouth nightly.  CALCIUM CARBONATE/VITAMIN D3 (CALCIUM+D PO) Take  by mouth.  multivitamin (ONE A DAY) tablet Take 1 Tab by mouth daily.  estradiol (ESTRACE) 0.5 mg tablet Take  by mouth daily.  dronabinol (MARINOL) 5 mg capsule Take 10 mg by mouth two (2) times daily as needed. Indications: HEADACHE    carvedilol (COREG) 6.25 mg tablet Take 6.25 mg by mouth two (2) times a day.  alendronate (FOSAMAX) 70 mg tablet Take  by mouth every Sunday.     venlafaxine-SR (EFFEXOR XR) 150 mg capsule Take  by mouth daily.  baclofen (LIORESAL) 10 mg tablet Take 20 mg by mouth four (4) times daily.  pantoprazole (PROTONIX) 40 mg tablet Take 40 mg by mouth daily. Twice a day    predniSONE (DELTASONE) 10 mg tablet Take  by mouth daily (with breakfast).  HYDROmorphone (DILAUDID) 2 mg tablet Take one tablet PO Q 6 HRS as needed for **POST OPERATIVE BREAKTHROUGH PAIN** (Patient not taking: Reported on 2021)     No current facility-administered medications for this visit. Allergies   Allergen Reactions    Aspirin Unknown (comments)     Severe stomach pain and bleeding    Lyrica [Pregabalin] Other (comments)     Slurred speech    Nsaids (Non-Steroidal Anti-Inflammatory Drug) Other (comments)     Hx  Of bleeding ulcers    Sulfa (Sulfonamide Antibiotics) Rash    Tetracycline Hives     Social History     Occupational History    Not on file   Tobacco Use    Smoking status: Former Smoker     Quit date: 2015     Years since quittin.0    Smokeless tobacco: Never Used   Vaping Use    Vaping Use: Never used   Substance and Sexual Activity    Alcohol use: No    Drug use: No     Comment: Prior history of drug use- pt denies    Sexual activity: Not on file     Family History   Problem Relation Age of Onset    Arthritis-osteo Other     Stroke Mother     Hypertension Mother         DIAGNOSTIC LAB DATA      No results found for: HBA1C, XKB2JIKK, URJ3PCVJ //   Lab Results   Component Value Date/Time    Glucose 81 2021 01:14 PM        No results found for: CGK4XOSI, AGM4CYHW      Lab Results   Component Value Date/Time    Vitamin D 25-Hydroxy 43.8 2021 01:14 PM         REVIEW OF SYSTEMS : 2021  ALL BELOW ARE Negative except : SEE HPI     All other systems reviewed and are negative. 12 point review of systems otherwise negative unless noted in HPI. DIAGNOSTIC IMAGING /ORDERS       No orders of the defined types were placed in this encounter.            I have reviewed the results of the above study. The interpretation of this study is my professional opinion. An electronic signature was used to authenticate this note. Disclaimer: Sections of this note are dictated using utilizing voice recognition software, which may have resulted in some phonetic based errors in grammar and contents. Even though attempts were made to correct all the mistakes, some may have been missed, and remained in the body of the document. If questions arise, please contact our department. Jose Foreman may have a reminder for a \"due or due soon\" health maintenance. I have asked that she contact her primary care provider for follow-up on this health maintenance.     Shayy Gr, as dictated by  Irena Oquendo MD  8/18/2021  9:09 AM

## 2021-08-18 ENCOUNTER — OFFICE VISIT (OUTPATIENT)
Dept: ORTHOPEDIC SURGERY | Age: 56
End: 2021-08-18
Payer: MEDICARE

## 2021-08-18 VITALS — HEIGHT: 62 IN | BODY MASS INDEX: 18.66 KG/M2 | RESPIRATION RATE: 15 BRPM | TEMPERATURE: 98.6 F

## 2021-08-18 DIAGNOSIS — L08.9 FOOT INFECTION: ICD-10-CM

## 2021-08-18 DIAGNOSIS — M96.89 NONUNION OF OSTEOTOMY SITE: Primary | ICD-10-CM

## 2021-08-18 PROCEDURE — 99024 POSTOP FOLLOW-UP VISIT: CPT | Performed by: ORTHOPAEDIC SURGERY

## 2021-09-01 ENCOUNTER — OFFICE VISIT (OUTPATIENT)
Dept: ORTHOPEDIC SURGERY | Age: 56
End: 2021-09-01
Payer: MEDICARE

## 2021-09-01 VITALS
WEIGHT: 105 LBS | BODY MASS INDEX: 19.32 KG/M2 | HEIGHT: 62 IN | RESPIRATION RATE: 16 BRPM | TEMPERATURE: 97 F | HEART RATE: 71 BPM

## 2021-09-01 DIAGNOSIS — Z98.890 POST-OPERATIVE STATE: ICD-10-CM

## 2021-09-01 DIAGNOSIS — L08.9 FOOT INFECTION: ICD-10-CM

## 2021-09-01 DIAGNOSIS — M96.89 NONUNION OF OSTEOTOMY SITE: Primary | ICD-10-CM

## 2021-09-01 DIAGNOSIS — M79.672 LEFT FOOT PAIN: ICD-10-CM

## 2021-09-01 PROCEDURE — 99024 POSTOP FOLLOW-UP VISIT: CPT | Performed by: PHYSICIAN ASSISTANT

## 2021-09-01 NOTE — PROGRESS NOTES
ASSESSMENT:     Diagnoses and all orders for this visit:    1. Nonunion of osteotomy site    2. Foot infection    3. Left foot pain    4. Post-operative state           ICD-10-CM ICD-9-CM   1. Nonunion of osteotomy site  M96.89 998.89   2. Foot infection  L08.9 686.9   3. Left foot pain  M79.672 729.5   4. Post-operative state  Z98.890 V45.89         PLAN:            Continue taking Doxycyline as prescribed     Your sutures were removed in the office today     You may shower in 3 days, NO soaking and no submerging in ANY water (no bath tubs, pools, hot tubs, oceans, etc)     Do not pick at your skin or the incision. Allow the incision to air when at rest (away from pets).  Pat dry after shower, no aggressive scrubbing or manipulation of the incision     Apply a gentle moisturizer with no fragrance (such as plain Vaseline) twice daily      Partial weightbearing on the affected extremity as instructed. Cover incision with large band aid or clean sock when wearing CAM boot to prevent irritation or friction against the incision       Follow up as instructed or sooner as needed        Follow-up and Dispositions    · Return in about 3 weeks (around 9/22/2021) for follow up evaluation. Dr. Brittany Mary has been consulted regarding the patient during this visit and he agrees with the assessment and plan    Patient expresses understanding of the diagnosis and treatment plan. Patient education has been provided. Karrie Velez may have a reminder for a \"due or due soon\" health maintenance. I have asked that she contact her primary care provider for follow-up on this health maintenance.  was reviewed by Trupti Edmondson PA-C on 9/1/2021.        SUBJECTIVE & OBJECTIVE:     ALEX Velez (1965) is a 61 y.o. female, established patient, 26 days s/p Left Great Toe Interphalangeal Region Irrigation And Debridement/possible Revision Arthrodesis/c-arm/long Flouro Table/accutrak Screwdriver Tray, [place] Dorsal Plate And Screws/regional Block - Left completed on 8/6/2021, and she is here for evaluation of the following chief complaint(s): Foot Pain (left foot great toe post op f/u)    Since last seen in the office, the patient reports that she is doing well and continues taking Doxycyline as prescribed. Patient will be taking the antibiotic for 42 days following surgery. Patient denies any fever, chills, CP, SOB or calf pain. The patient denies any new illness or injuries to report since last seen in the office. Marcela Olivera  has a past medical history of Anemia, Chronic pain, Contact lens/glasses fitting, Finger pain, left, Frequent UTI (2006), GERD (gastroesophageal reflux disease), H/O eating disorder, Hip fracture, left ([facility] (2000), History of hormone replacement therapy, Hypertension, Leg length discrepancy, Migraines, neuralgic, Needs pre-anesthesia assessment, Post herpetic neuralgia, Psychiatric disorder, PUD (peptic ulcer disease), Raynaud's disease, Rheumatoid arthritis ([facility] Rheumatoid arthritis [facility]), Shingles (April 2014), and Tooth abscess (10/6/15). Other than previously noted, patient reports no changes in medications, allergies, social or family history. Marcela Olivera has been experiencing pain, discomfort and associated symptoms and has tried modalities of treatment and/or self treatment confirmed as outlined in the pain assessment below. Pain Assessment  9/1/2021   Location of Pain Foot   Pain Location Comment -   Location Modifiers Left   Severity of Pain 5   Quality of Pain Throbbing; [place]; Aching;Burning   Quality of Pain Comment -   Duration of Pain Persistent   Frequency of Pain Constant   Frequency of Pain Comment -   Date Pain First Started -   Aggravating Factors Bending   Aggravating Factors Comment -   Limiting Behavior Some   Relieving Factors Rest;NSAID   Relieving Factors Comment -   Result of Injury No       REVIEW OF SYSTEMS:                  All Below are Negative except as indicated in the HPI: See HPI                Constitutional: negative for fever, chills, night sweats and unexpected weight loss and malaise/fatigue              HEENT: Negative. No hoarseness, no double vision              Respiratory: Negative. No difficulty breathing, No SOB              Cardiovascular: negative for chest pain, claudication, HOFFMANN. (-) Leg swelling               Gastrointestinal: Negative for pain, Blood in stool, incontinence, pelvic pain, N/V/C/D              Genitourinary: No saddle anesthesia, pelvic pain, blood in urine, incontinence, dysuria               Neurological: Negative for dizziness and weakness, visual changes, confusion, headaches, seizures               Phychiatric/Behavioral: Negative for depression, memory loss, substance abuse               Extremities: Negative for hair changes, rash, or skin lesion changes              Hematologic: Negative for bleeding problems, bruising, pallor or swollen lymph nodes              Peripheral Vascular: No calf pain, no circulation deficits              Musculoskeletal: As per HPI above      PHYSICAL EXAM:        Visit Vitals  Pulse 71   Temp 97 °F (36.1 °C) (Temporal)   Resp 16   Ht 5' 2\" (1.575 m)   Wt 105 lb (47.6 kg)   BMI 19.20 kg/m²         Constitutional: [x] Alert, cooperative, appears well-developed and well-nourished [x] No apparent distress      [] Abnormal - Body mass index is 19.2 kg/m².  makes the treatment plan more complex     Mental status: [x] Alert and awake  [x] Oriented to person/place/time   [x] Normal mood/behavior/speech   [x] Normal dress/motor activity/thought processes/memory      [x] Able to follow commands    [] Abnormal - Dementia    Eyes:   EOM    [x]  Normal    [] Abnormal -   Sclera  [x]  Normal    [] Abnormal -          Discharge [x]  None visible   [] Abnormal -     HENT: [x] Normocephalic, atraumatic  [] Abnormal -   [x] Mouth/Throat: Mucous membranes are moist    External Ears [x] Normal, hearing intact  [] Abnormal -    Neck: [x] Supple. No visualized mass, normal ROM [] Abnormal -     Pulmonary/Chest: [x] Respiratory effort normal   [x] No visualized signs of difficulty breathing or respiratory distress        [] Abnormal -        Cardiovascular/    [x] Normal pulses to each foot  [] Abnormal -   Peripheral Vascular:    Neurological:        [x] No Facial Asymmetry (Cranial nerve 7 motor function) (limited exam due to video visit) [x] No gross defects         [x] No gaze palsy        [] Abnormal -          Skin:        [x] No significant exanthematous lesions or discoloration noted on facial skin or visible areas       [] Abnormal -            Psychiatric:       [x] Normal Affect, mood, judgement, behavior, conduct [] Abnormal -        [x] No Hallucinations      Musculoskeletal:   [x] Normal gait with no signs of ataxia         [x] Normal range of motion of neck        [] Abnormal -       MUSCULOSKELETAL: EXAMINATION OF:     ANKLE/FOOT left    DRESSINGS: CDI   DRAINAGE: none   INCISION: Incision looks good, skin well approximated, no dehiscence, nylon sutures in place without disruption. SKIN: Mild edema, no erythema, no ecchymosis, no warmth. No rash or skin lesions. No signs of infection or cellulitis present. TENDERNESS:  Mild tenderness to palpation to the great toe  NEUROVASCULAR:  Grossly intact. Motor/Sensory: NL/NL. Positive distal pulses and capillary refill. DVT ASSESSMENT:  The calf is not tender to palpation. No evidence of DVT seen on physical exam.  LYMPHATICS: No extremity lymphedema, No calf swelling, no tenderness to calf muscles  JOINT MOTION: Not tested. There is pain-free range of motion of adjacent joints. Negative joint effusion  JOINT/TENDON STABILITY: No Ankle or Subtalar instability or joint laxity. No peroneal sublux ability or dislocation  ALIGNMENT: Forefoot, Midfoot, Hindfoot WNL.   DEFORMITY: none present       An electronic signature was used to authenticate this note. -- Nilda Machado. [facility], ANAI WADE  9/1/2021      Disclaimer: Sections of this note may have been dictated utilizing voice recognition software, which may have resulted in some phonetic based errors in grammar and contents. Even though attempts were made to correct all the mistakes, some may have been missed, and remained in the body of the document. If questions arise, please contact our department.        RADIOGRAPHS & DIAGNOSTIC STUDIES     None    9/1/2021  [place] ANAI Liu

## 2021-09-01 NOTE — PATIENT INSTRUCTIONS
 Continue taking Doxycyline as prescribed     Your sutures were removed in the office today     You may shower in 3 days, NO soaking and no submerging in ANY water (no bath tubs, pools, hot tubs, oceans, etc)     Do not pick at your skin or the incision. Allow the incision to air when at rest (away from pets).  Pat dry after shower, no aggressive scrubbing or manipulation of the incision     Apply a gentle moisturizer with no fragrance (such as plain Vaseline) twice daily      Partial weightbearing on the affected extremity as instructed. Cover incision with large band aid or clean sock when wearing CAM boot to prevent irritation or friction against the incision       Follow up as instructed or sooner as needed        If you have a reminder for a \"due or due soon\" health maintenance, please contact your primary care provider for follow-up on this health maintenance. Carey Gill [facility], MAURO, PA-C  9/1/2021  11:09 AM

## 2021-09-22 ENCOUNTER — OFFICE VISIT (OUTPATIENT)
Dept: ORTHOPEDIC SURGERY | Age: 56
End: 2021-09-22
Payer: MEDICARE

## 2021-09-22 VITALS
OXYGEN SATURATION: 98 % | HEIGHT: 63 IN | BODY MASS INDEX: 18.61 KG/M2 | HEART RATE: 100 BPM | WEIGHT: 105 LBS | TEMPERATURE: 97.6 F

## 2021-09-22 DIAGNOSIS — M79.672 LEFT FOOT PAIN: Primary | ICD-10-CM

## 2021-09-22 DIAGNOSIS — Z98.890 POST-OPERATIVE STATE: ICD-10-CM

## 2021-09-22 DIAGNOSIS — M96.89 NONUNION OF OSTEOTOMY SITE: ICD-10-CM

## 2021-09-22 PROCEDURE — 73630 X-RAY EXAM OF FOOT: CPT | Performed by: PHYSICIAN ASSISTANT

## 2021-09-22 PROCEDURE — 99024 POSTOP FOLLOW-UP VISIT: CPT | Performed by: PHYSICIAN ASSISTANT

## 2021-09-22 NOTE — PROGRESS NOTES
ASSESSMENT:     Diagnoses and all orders for this visit:      ICD-10-CM ICD-9-CM    1. Left foot pain  M79.672 729.5 AMB POC XRAY, FOOT; COMPLETE, 3+ VIEW   2. Nonunion of osteotomy site  M96.89 998.89    3. Post-operative state  Z98.890 V45.89         PLAN:         Use the bone stimulator as instructed     Apply a gentle moisturizer with no fragrance (such as plain Vaseline) twice daily      Partial weightbearing on the affected extremity as instructed. Cover incision with large band aid or clean sock when wearing CAM boot to prevent irritation or friction against the incision     Use the Tubigrip provided for swelling and comfort       Follow-up and Dispositions    · Return in about 6 weeks (around 11/3/2021) for follow up evaluation. Dr. Erinn Chandler has been consulted regarding the patient during this visit and he agrees with the assessment and plan    Patient expresses understanding of the diagnosis and treatment plan. Patient education has been provided.  was reviewed by Aly Fagan PA-C on 9/22/2021. Sang Miramontes may have a reminder for a \"due or due soon\" health maintenance. I have asked that she contact her primary care provider for follow-up on this health maintenance. SUBJECTIVE & OBJECTIVE:     ALEX Miramontes (1965) is a 61 y.o. female, established patient, 52 days s/p Left Great Toe Interphalangeal Region Irrigation And Debridement/possible Revision Arthrodesis/c-arm/long Flouro Table/accutrak Screwdriver Tray, [place] Dorsal Plate And Screws/regional Block - Left completed on 8/6/2021, and she is here for evaluation of the following chief complaint(s): Toe Pain (left big toe)      Since last seen in the office, the patient reports that she is doing much better with less pain and less swelling. She continues to wear the CAM boot and states that the post op shoe was uncomfortable. Patient denies any fever, chills, CP, SOB or calf pain.  The patient denies any new illness or injuries to report since last seen in the office. Eduin Ruiz  has a past medical history of Anemia, Chronic pain, Contact lens/glasses fitting, Finger pain, left, Frequent UTI (2006), GERD (gastroesophageal reflux disease), H/O eating disorder, Hip fracture, left ([facility] [address] (2000), History of hormone replacement therapy, Hypertension, Leg length discrepancy, Migraines, neuralgic, Needs pre-anesthesia assessment, Post herpetic neuralgia, Psychiatric disorder, PUD (peptic ulcer disease), Raynaud's disease, Rheumatoid arthritis ([facility] Rheumatoid arthritis [facility]), Shingles (April 2014), and Tooth abscess (10/6/15). Other than previously noted, patient reports no changes in medications, allergies, social or family history. Eduin Ruiz has been experiencing pain, discomfort and associated symptoms and has tried modalities of treatment and/or self treatment confirmed as outlined in the pain assessment below. Pain Assessment  9/22/2021   Location of Pain Toe   Pain Location Comment -   Location Modifiers Left   Severity of Pain 4   Quality of Pain Throbbing;Dull   Quality of Pain Comment -   Duration of Pain -   Frequency of Pain -   Frequency of Pain Comment -   Date Pain First Started -   Aggravating Factors -   Aggravating Factors Comment -   Limiting Behavior -   Relieving Factors -   Relieving Factors Comment -   Result of Injury -       PHYSICAL EXAM:        Visit Vitals  Pulse 100   Temp 97.6 °F (36.4 °C) (Temporal)   Ht 5' 3\" (1.6 m)   Wt 105 lb (47.6 kg)   SpO2 98%   BMI 18.60 kg/m²         Constitutional: [x] Alert, cooperative, appears well-developed and well-nourished [x] No apparent distress      [] Abnormal - Body mass index is 18.6 kg/m².  makes the treatment plan more complex     Mental status: [x] Alert and awake  [x] Oriented to person/place/time   [x] Normal mood/behavior/speech   [x] Normal dress/motor activity/thought processes/memory      [x] Able to follow commands    [] Abnormal - Dementia    Eyes:   EOM    [x]  Normal    [] Abnormal -   Sclera  [x]  Normal    [] Abnormal -          Discharge [x]  None visible   [] Abnormal -     HENT: [x] Normocephalic, atraumatic  [] Abnormal -   [x] Mouth/Throat: Mucous membranes are moist    External Ears [x] Normal, hearing intact  [] Abnormal -    Neck: [x] Supple. No visualized mass, normal ROM [] Abnormal -     Pulmonary/Chest: [x] Respiratory effort normal   [x] No visualized signs of difficulty breathing or respiratory distress        [] Abnormal -        Cardiovascular/    [x] Normal pulses to each foot  [] Abnormal -   Peripheral Vascular:    Neurological:        [x] No Facial Asymmetry (Cranial nerve 7 motor function) (limited exam due to video visit) [x] No gross defects         [x] No gaze palsy        [] Abnormal -          Skin:        [x] No significant exanthematous lesions or discoloration noted on facial skin or visible areas       [] Abnormal -            Psychiatric:       [x] Normal Affect, mood, judgement, behavior, conduct [] Abnormal -        [x] No Hallucinations      Musculoskeletal:   [x] Normal gait with no signs of ataxia         [x] Normal range of motion of neck        [] Abnormal -       MUSCULOSKELETAL: EXAMINATION OF:     ANKLE/FOOT left    DRESSINGS: CDI   DRAINAGE: none   INCISION: Incision looks good, skin well healed  SKIN: Mild edema, no erythema, no ecchymosis, no warmth. No rash or skin lesions. No signs of infection or cellulitis present. TENDERNESS:  No tenderness to palpation to the great toe   NEUROVASCULAR:  Grossly intact. Motor/Sensory: NL/NL. Positive distal pulses and capillary refill. DVT ASSESSMENT:  The calf is not tender to palpation. No evidence of DVT seen on physical exam.  LYMPHATICS: No extremity lymphedema, No calf swelling, no tenderness to calf muscles  JOINT MOTION: Not tested . There is pain-free range of motion of adjacent joints.  Negative joint effusion  JOINT/TENDON STABILITY: No Ankle or Subtalar instability or joint laxity. No peroneal sublux ability or dislocation  ALIGNMENT: Forefoot, Midfoot, Hindfoot WNL. DEFORMITY: none present       An electronic signature was used to authenticate this note. -- Margaret Orozco. [facility], ANAI WADE  9/22/2021      Disclaimer: Sections of this note may have been dictated utilizing voice recognition software, which may have resulted in some phonetic based errors in grammar and contents. Even though attempts were made to correct all the mistakes, some may have been missed, and remained in the body of the document. If questions arise, please contact our department. REVIEW OF SYSTEMS:                  All Below are Negative except as indicated in the HPI: See HPI                Constitutional: negative for fever, chills, night sweats and unexpected weight loss and malaise/fatigue              HEENT: Negative. No hoarseness, no double vision              Respiratory: Negative.  No difficulty breathing, No SOB              Cardiovascular: negative for chest pain, claudication, HOFFMANN. (-) Leg swelling               Gastrointestinal: Negative for pain, Blood in stool, incontinence, pelvic pain, N/V/C/D              Genitourinary: No saddle anesthesia, pelvic pain, blood in urine, incontinence, dysuria               Neurological: Negative for dizziness and weakness, visual changes, confusion, headaches, seizures               Phychiatric/Behavioral: Negative for depression, memory loss, substance abuse               Extremities: Negative for hair changes, rash, or skin lesion changes              Hematologic: Negative for bleeding problems, bruising, pallor or swollen lymph nodes              Peripheral Vascular: No calf pain, no circulation deficits              Musculoskeletal: As per HPI above      RADIOGRAPHS & DIAGNOSTIC STUDIES       Left foot X-rays, 3 views, AP/LAT/OBL completed 9/22/2021 AT [facility]      FINDINGS:    X-rays reveal post op changes s/p Left Great Toe Interphalangeal Region Irrigation And Debridement/possible Revision Arthrodesis/c-arm/long Flouro Table/accutrak Screwdriver Tray, [place] Dorsal Plate And Screws/regional Block - Left. Overall alignment is acceptable. Hardware is in good position with no indication of movement or failure. Soft tissue swelling is mild. Degenerative changes are noted. Mineralization suggests osteopenia. Calcified vessels are not present. IMPRESSION:    As stated above      I have personally reviewed the results of the above study.  The interpretation of this study is my professional opinion    9/22/2021  Cristel Pendleton PA-C

## 2021-09-22 NOTE — PATIENT INSTRUCTIONS
 Use the bone stimulator as instructed     Apply a gentle moisturizer with no fragrance (such as plain Vaseline) twice daily      Partial weightbearing on the affected extremity as instructed. Cover incision with large band aid or clean sock when wearing CAM boot to prevent irritation or friction against the incision     Use the Tubigrip provided for swelling and comfort       Follow up as instructed or sooner as needed      If you have a reminder for a \"due or due soon\" health maintenance, please contact your primary care provider for follow-up on this health maintenance. Carey Sanchez [facility], MPA, PA-C  9/22/2021  11:09 AM

## 2021-11-10 ENCOUNTER — OFFICE VISIT (OUTPATIENT)
Dept: ORTHOPEDIC SURGERY | Age: 56
End: 2021-11-10
Payer: MEDICARE

## 2021-11-10 VITALS — BODY MASS INDEX: 18.61 KG/M2 | TEMPERATURE: 96.6 F | WEIGHT: 105 LBS | HEIGHT: 63 IN

## 2021-11-10 DIAGNOSIS — M79.672 LEFT FOOT PAIN: ICD-10-CM

## 2021-11-10 DIAGNOSIS — M96.89 NONUNION OF OSTEOTOMY SITE: Primary | ICD-10-CM

## 2021-11-10 PROCEDURE — 99213 OFFICE O/P EST LOW 20 MIN: CPT | Performed by: ORTHOPAEDIC SURGERY

## 2021-11-10 PROCEDURE — G8420 CALC BMI NORM PARAMETERS: HCPCS | Performed by: ORTHOPAEDIC SURGERY

## 2021-11-10 PROCEDURE — 3017F COLORECTAL CA SCREEN DOC REV: CPT | Performed by: ORTHOPAEDIC SURGERY

## 2021-11-10 PROCEDURE — G8432 DEP SCR NOT DOC, RNG: HCPCS | Performed by: ORTHOPAEDIC SURGERY

## 2021-11-10 PROCEDURE — G8427 DOCREV CUR MEDS BY ELIG CLIN: HCPCS | Performed by: ORTHOPAEDIC SURGERY

## 2021-11-10 NOTE — PROGRESS NOTES
AMBULATORY PROGRESS NOTE      Patient: Ean Trejo             MRN: [id number]     SSN: [id number] Body mass index is 18.6 kg/m². YOB: 1965     AGE: 61 y.o. EX: female    PCP: Delphine Hurley MD       IMPRESSION //  DIAGNOSIS AND TREATMENT PLAN        Ean Trejo has a diagnosis of:      She is now 80 days out having underwent irrigation debridement and removal of hardware, from the great toe, for nonunion of left great toe IP region. It recultured very poor bone stock, and left great toe IP region was not amenable for additional fixation. Since I saw her last she reports her swelling is gone down nicely in the left great toe no redness erythema no fever shakes chills night sweats none nothing in her history to suggest any recurrent infection. At all the hardware removal of the other than one single screw, the MTP region. .    Recommendation, is to wear comfortable shoes get out of the cam walker boot reassess her in 5 to 6 weeks. DIAGNOSES    1. Nonunion of osteotomy site    2. Left foot pain        Orders Placed This Encounter    Generic Supply Order     Custom accommodated orthotic ()            PLAN:    1. DME: Custom accommodated orthotic    RTO: 5 weeks    There are no Patient Instructions on file for this visit. Please follow up with your PCP for any health maintenance as recommended         Ean Trejo  expresses understanding of the diagnosis, treatment plan, and all of their proposed questions were answered to their satisfaction. Patient education has been provided re the diagnoses.          HPI //  Arleydiego Darling IS A 61 y.o. female who is a/an  established patient, presenting to my outpatient office for evaluation of  the following chief complaint(s):     Chief Complaint   Patient presents with    Toe Pain     Left        Ean Trejo presents today for a post op of LEFT GREAT TOE INTERPHALANGEAL REGION IRRIGATION AND DEBRIDEMENT/POSSIBLE REVISION ARTHRODESIS/C-ARM/LONG FLOURO TABLE/ACCUTRAK SCREWDRIVER TRAY, [place] DORSAL PLATE AND SCREWS/REGIONAL BLOCK (Left Foot performed on 8/06/2021. She mentions she desires an orthotic because her gait is abnormal      Visit Vitals  Temp (!) 96.6 °F (35.9 °C) (Temporal)   Ht 5' 3\" (1.6 m)   Wt 105 lb (47.6 kg)   BMI 18.60 kg/m²       Appearance: Alert, well appearing and pleasant patient who is in no distress, oriented to person, place/time, and who follows commands. Normal dress/motor activity/thought processes/memory. This patient is accompanied in the examination room by her  self. Patient arrives to office via: with assistive device: L CAM walker boot  Psychiatric:  Normal Affect/mood. Judgement, behavior, and conduct are appropriate. Speech normal in context and clarity, memory intact grossly, no involuntary movements - tremors. H EENT (2): Head normocephalic & atraumatic. Eye: pupils are round// EOM are intact // Neck: ROM WNL  // Hearings Intact   Respiratory: Breathing non labored     ANKLE/FOOT left    Gait: uses assistive device L CAM walker boot  Tenderness: no     Cutaneous:  Swelling of left great toe mild/much less swelling compared to preop in the great toe  Joint Motion:   WNL   Joint / Tendon Stability: No Ankle or Subtalar instability or joint laxity. No peroneal sublux ability or dislocation  Alignment: neutral Hindfoot,    Neuro Motor/Sensory: NL/NL  Vascular: NL foot/ankle pulses,   Lymphatics: No extremity lymphedema, No calf swelling, no tenderness to calf muscles. CHART REVIEW     Obdulio Solis has been experiencing pain and discomfort confirmed as outlined in the pain assessment outlined below.  was reviewed by Carolina Forbes MD on 11/10/2021.      Pain Assessment  11/10/2021   Location of Pain Toe   Pain Location Comment -   Location Modifiers Left   Severity of Pain 2   Quality of Pain Deric Her; Other (Comment)   Quality of Pain Comment Stabbing   Duration of Pain A few minutes   Frequency of Pain Constant   Frequency of Pain Comment -   Date Pain First Started -   Aggravating Factors Other (Comment)   Aggravating Factors Comment Any type of pressure being put on foot   Limiting Behavior Yes   Relieving Factors Elevation; Rest   Relieving Factors Comment -   Result of Injury No        Amina Almanazr  has a past medical history of Anemia, Chronic pain, Contact lens/glasses fitting, Finger pain, left, Frequent UTI (2006), GERD (gastroesophageal reflux disease), H/O eating disorder, Hip fracture, left ([address] (2000), History of hormone replacement therapy, Hypertension, Leg length discrepancy, Migraines, neuralgic, Needs pre-anesthesia assessment, Post herpetic neuralgia, Psychiatric disorder, PUD (peptic ulcer disease), Raynaud's disease, Rheumatoid arthritis ([facility] [address] Rheumatoid arthritis [facility]), Shingles (April 2014), and Tooth abscess (10/6/15).      Patients is employed at:         Past Medical History:   Diagnosis Date    Anemia     Chronic pain     Contact lens/glasses fitting     Finger pain, left     Frequent UTI 2006    GERD (gastroesophageal reflux disease)     H/O eating disorder     remote    Hip fracture, left ([address] 2000    History of hormone replacement therapy     Hypertension     Leg length discrepancy     Migraines, neuralgic     Needs pre-anesthesia assessment     Patient reports a history of high tolerance to pain medicine and prior hisoty of drug use    Post herpetic neuralgia     Psychiatric disorder     PUD (peptic ulcer disease)     Raynaud's disease     Rheumatoid arthritis ([address]     Rheumatoid arthritis ([address]     Shingles April 2014    Tooth abscess 10/6/15    Completed Amoxicillin      Past Surgical History:   Procedure Laterality Date    FOOT/TOES SURGERY PROC UNLISTED      HX COLONOSCOPY      HX GYN  2012    fibroids    HX ORTHOPAEDIC Left 2012    partial hip replacement    HX ORTHOPAEDIC  2009    attempt bunionectomy    HX ORTHOPAEDIC  10/2015    Dr. Nataliia Flores: First Metatarsophylangeal Fusion,etc    HX ORTHOPAEDIC  2014    redo left hip replacement    NEUROLOGICAL PROCEDURE UNLISTED Bilateral 2016    Thalamotomy      Current Outpatient Medications   Medication Sig    levETIRAcetam (Keppra) 1,000 mg tablet Take 1,500 mg by mouth daily. Indications: Neuralgia    gabapentin (NEURONTIN) 600 mg tablet Take 600 mg by mouth three (3) times daily.  methotrexate (RHEUMATREX) 2.5 mg tablet Take 15 mg by mouth every Sunday.  folic acid (FOLVITE) 1 mg tablet Take  by mouth daily.  hydrOXYchloroQUINE (PLAQUENIL) 200 mg tablet Take 200 mg by mouth two (2) times a day.  azaTHIOprine (Imuran) 50 mg tablet Take 50 mg by mouth three (3) times daily.  prochlorperazine (COMPAZINE) 10 mg tablet Take 5 mg by mouth every six (6) hours as needed.  dextroamphetamine-amphetamine (ADDERALL) 10 mg tablet Take 10 mg by mouth two (2) times a day.  medroxyPROGESTERone (PROVERA) 5 mg tablet Take 5 mg by mouth daily.  imipramine (TOFRANIL) 25 mg tablet Take 300 mg by mouth nightly.  CALCIUM CARBONATE/VITAMIN D3 (CALCIUM+D PO) Take  by mouth.  multivitamin (ONE A DAY) tablet Take 1 Tab by mouth daily.  estradiol (ESTRACE) 0.5 mg tablet Take  by mouth daily.  dronabinol (MARINOL) 5 mg capsule Take 10 mg by mouth two (2) times daily as needed. Indications: HEADACHE    carvedilol (COREG) 6.25 mg tablet Take 6.25 mg by mouth two (2) times a day.  alendronate (FOSAMAX) 70 mg tablet Take  by mouth every Sunday.  venlafaxine-SR (EFFEXOR XR) 150 mg capsule Take  by mouth daily.  baclofen (LIORESAL) 10 mg tablet Take 20 mg by mouth four (4) times daily.  pantoprazole (PROTONIX) 40 mg tablet Take 40 mg by mouth daily. Twice a day    doxycycline (MONODOX) 100 mg capsule Take 1 Capsule by mouth two (2) times a day.  (Patient not taking: Reported on 11/10/2021)    amoxicillin-clavulanate (AUGMENTIN) 500-125 mg per tablet Take 1 Tablet by mouth two (2) times a day. (Patient not taking: Reported on 2021)    polyethylene glycol (Miralax) 17 gram packet Take 1 Packet by mouth daily. (Patient taking differently: Take 17 g by mouth as needed.)    HYDROmorphone (DILAUDID) 2 mg tablet Take one tablet PO Q 6 HRS as needed for **POST OPERATIVE BREAKTHROUGH PAIN** (Patient not taking: Reported on 2021)    0.9% sodium chloride solution  (Patient not taking: Reported on 11/10/2021)    predniSONE (DELTASONE) 10 mg tablet Take  by mouth daily (with breakfast). (Patient not taking: Reported on 11/10/2021)     No current facility-administered medications for this visit.      Allergies   Allergen Reactions    Aspirin Unknown (comments)     Severe stomach pain and bleeding    Lyrica [Pregabalin] Other (comments)     Slurred speech    Nsaids (Non-Steroidal Anti-Inflammatory Drug) Other (comments)     Hx  Of bleeding ulcers    Sulfa (Sulfonamide Antibiotics) Rash    Tetracycline Hives     Social History     Occupational History    Not on file   Tobacco Use    Smoking status: Former Smoker     Quit date: 2015     Years since quittin.3    Smokeless tobacco: Never Used   Vaping Use    Vaping Use: Never used   Substance and Sexual Activity    Alcohol use: No    Drug use: No     Comment: Prior history of drug use- pt denies    Sexual activity: Not on file     Family History   Problem Relation Age of Onset    Arthritis-osteo Other     Stroke Mother     Hypertension Mother         DIAGNOSTIC LAB DATA      No results found for: HBA1C, IMX0VVRL, AGA0JNDH //   Lab Results   Component Value Date/Time    Glucose 81 2021 01:14 PM        No results found for: HJD6GRVU, XHU8YVKR      Lab Results   Component Value Date/Time    Vitamin D 25-Hydroxy 43.8 2021 01:14 PM        Drug Screen Most Recent Result Date     DRUG SCREEN, URINE  Collected: 12/18/2015  6:48 AM (Final result)    Complete Results                  REVIEW OF SYSTEMS : 11/10/2021  ALL BELOW ARE Negative except : SEE HPI     All other systems reviewed and are negative. 12 point review of systems otherwise negative unless noted in HPI. RADIOGRAPHS// IMAGING//DIAGNOSTIC DATA     Orders Placed This Encounter    Generic Supply Order          I have personally reviewed the images of the above study. The interpretation of this study is my professional opinion            On this date 11/10/2021 I have spent 22 minutes reviewing previous notes, test results and face to face with the patient discussing the diagnosis and importance of compliance with the treatment plan as well as documenting on the day of the visit. Disclaimer:     Sections of this note are dictated using utilizing voice recognition software, which may have resulted in some phonetic based errors in grammar and contents. Even though attempts were made to correct all the mistakes, some may have been missed, and remained in the body of the document. If questions arise, please contact our department. An electronic signature was used to authenticate this note. Flako Piper may have a reminder for a \"due or due soon\" health maintenance. I have asked that she contact her primary care provider for follow-up on this health maintenance. There are no Patient Instructions on file for this visit. Please follow up with your PCP for any health maintenance as recommended         Pricila Rios, as dictated by,  Socorro Wilkinson.   11/10/2021  8:33 AM

## 2021-11-12 NOTE — PATIENT INSTRUCTIONS
Please follow up with Ernesto Dance for Pre-Op Scheduling. You are advised to contact us if your condition worsens. Metatarsalgia: Care Instructions  Your Care Instructions    Metatarsalgia (say \"met-uh-tar-SAL-allyson-thom\") is pain in the ball of the foot. It sometimes spreads to the toes. The ball of the foot is the bottom of the foot, where the toes join the foot. While walking might be very painful, the pain is usually not a sign of a serious or permanent problem. But any pain can affect your life, so it is important that you treat it. Pain in this area can be caused by many things. For example, you may have this pain if you stand or walk a lot or wear tight shoes or high heels. Your pain might be caused by inflammation of a joint (capsulitis). It is most common in the joint at the base of the second toe, near the ball of the foot. Capsulitis happens when ligaments that go around the joint become inflamed. The joint may be swollen. It may feel like there is a small rock under it. You may have had an X-ray if your doctor wanted to make sure a more serious problem is not causing your pain. Treatment may consist of home care, such as rest, wearing different shoes, and taking over-the-counter pain medicines. It can take months for the pain to go away. If the ligaments around a joint are torn, or if a toe has started to slant toward the toe next to it, you may need surgery. Follow-up care is a key part of your treatment and safety. Be sure to make and go to all appointments, and call your doctor if you are having problems. It's also a good idea to know your test results and keep a list of the medicines you take. How can you care for yourself at home? · Rest your foot. If an activity is causing the pain, find another one to do that does not put so much pressure on your foot. · Put ice or a cold pack on your foot when it hurts or after you've done something that usually causes pain.  Do this for 10 to 20 minutes followed by Dr Shelbie Cruz, s/p intravitreal injections OU. at a time. Put a thin cloth between the ice and your skin. · Take an over-the-counter pain medicine, such as acetaminophen (Tylenol), ibuprofen (Advil, Motrin), or naproxen (Aleve). Be safe with medicines. Read and follow all instructions on the label. · Do not take two or more pain medicines at the same time unless the doctor told you to. Many pain medicines have acetaminophen, which is Tylenol. Too much acetaminophen (Tylenol) can be harmful. · Wear roomy, comfortable shoes. · If your doctor recommends it, use special pads to relieve the pressure on your foot. The pads may fit into your shoes, or they may stick to the soles of your feet. · Ask your doctor about using orthotic shoe devices. These are molded pieces of rubber, leather, metal, plastic, or other synthetic material that are inserted into a shoe. · Wear shoes with good arch support. · Try not to wear high heels or narrow shoes. · Follow your doctor's or physical therapist's directions for exercise. When should you call for help? Watch closely for changes in your health, and be sure to contact your doctor if:  ? · You have new or worse symptoms. ? · You do not get better as expected. Where can you learn more? Go to http://roldan-yudith.info/. Enter I293 in the search box to learn more about \"Metatarsalgia: Care Instructions. \"  Current as of: March 21, 2017  Content Version: 11.4  © 5065-4689 [employer]. Care instructions adapted under license by [employer] (which disclaims liability or warranty for this information). If you have questions about a medical condition or this instruction, always ask your healthcare professional. [place] [address] any warranty or liability for your use of this information.

## 2022-01-06 ENCOUNTER — TELEPHONE (OUTPATIENT)
Dept: ORTHOPEDIC SURGERY | Age: 57
End: 2022-01-06

## 2022-01-06 NOTE — TELEPHONE ENCOUNTER
Patient states that her left big toe is infected, and needs to be seen as soon as possible. Please advise where/when the patient can be scheduled.

## 2022-01-07 ENCOUNTER — TELEPHONE (OUTPATIENT)
Dept: ORTHOPEDIC SURGERY | Age: 57
End: 2022-01-07

## 2022-01-07 ENCOUNTER — HOSPITAL ENCOUNTER (EMERGENCY)
Age: 57
Discharge: HOME | End: 2022-01-07
Attending: EMERGENCY MEDICINE
Payer: COMMERCIAL

## 2022-01-07 VITALS
SYSTOLIC BLOOD PRESSURE: 138 MMHG | TEMPERATURE: 98.8 F | DIASTOLIC BLOOD PRESSURE: 97 MMHG | RESPIRATION RATE: 18 BRPM | HEART RATE: 79 BPM | HEIGHT: 62 IN | OXYGEN SATURATION: 98 % | WEIGHT: 108 LBS | BODY MASS INDEX: 19.88 KG/M2

## 2022-01-07 DIAGNOSIS — L03.032 CELLULITIS OF GREAT TOE OF LEFT FOOT: Primary | ICD-10-CM

## 2022-01-07 DIAGNOSIS — Q70.22 FUSION OF TOES OF LEFT FOOT: ICD-10-CM

## 2022-01-07 PROCEDURE — 99283 EMERGENCY DEPT VISIT LOW MDM: CPT

## 2022-01-07 PROCEDURE — 74011250637 HC RX REV CODE- 250/637: Performed by: EMERGENCY MEDICINE

## 2022-01-07 RX ORDER — CEPHALEXIN 250 MG/1
500 CAPSULE ORAL
Status: COMPLETED | OUTPATIENT
Start: 2022-01-07 | End: 2022-01-07

## 2022-01-07 RX ORDER — CEPHALEXIN 500 MG/1
500 CAPSULE ORAL 4 TIMES DAILY
Qty: 40 CAPSULE | Refills: 0 | Status: SHIPPED | OUTPATIENT
Start: 2022-01-07 | End: 2022-01-07 | Stop reason: SDUPTHER

## 2022-01-07 RX ORDER — HYDROCODONE BITARTRATE AND ACETAMINOPHEN 5; 325 MG/1; MG/1
1 TABLET ORAL
Qty: 12 TABLET | Refills: 0 | Status: SHIPPED | OUTPATIENT
Start: 2022-01-07 | End: 2022-01-10 | Stop reason: SDUPTHER

## 2022-01-07 RX ORDER — HYDROCODONE BITARTRATE AND ACETAMINOPHEN 5; 325 MG/1; MG/1
1 TABLET ORAL
Status: COMPLETED | OUTPATIENT
Start: 2022-01-07 | End: 2022-01-07

## 2022-01-07 RX ORDER — HYDROCODONE BITARTRATE AND ACETAMINOPHEN 5; 325 MG/1; MG/1
1 TABLET ORAL
Qty: 12 TABLET | Refills: 0 | Status: SHIPPED | OUTPATIENT
Start: 2022-01-07 | End: 2022-01-07 | Stop reason: SDUPTHER

## 2022-01-07 RX ORDER — CEPHALEXIN 500 MG/1
500 CAPSULE ORAL 4 TIMES DAILY
Qty: 40 CAPSULE | Refills: 0 | Status: SHIPPED | OUTPATIENT
Start: 2022-01-07 | End: 2022-01-17

## 2022-01-07 RX ADMIN — CEPHALEXIN 500 MG: 250 CAPSULE ORAL at 22:05

## 2022-01-07 RX ADMIN — HYDROCODONE BITARTRATE AND ACETAMINOPHEN 1 TABLET: 5; 325 TABLET ORAL at 22:05

## 2022-01-07 NOTE — TELEPHONE ENCOUNTER
Patient states that she has changed her mind about going to the ER  For her infected big toe, because the ER is flooded with Covid patients. Patient is requesting to be seen in the office Monday. Patient is requesting to talk to [place]. Patient can be reached at [phone].

## 2022-01-07 NOTE — TELEPHONE ENCOUNTER
Spoke with patient again. I informed her again, that Dr. Alexa Rivera does not want her to wait until next week to be seen. Again, instructed patient to go to ED for infected toe.

## 2022-01-07 NOTE — TELEPHONE ENCOUNTER
Spoke with patient. Patient states toe is infected and \"is as big as before\"  Informed patient that she should go to the ED, since patient has had previous surgery on this toe and also, per patient, she has a metal jerome in her thigh. Patient states that she will go to either the ED or urgent care tomorrow.

## 2022-01-08 NOTE — ED PROVIDER NOTES
HPI patient is a 79-year-old female who presents to ER with complaint of having pain in her left great toe. Patient had surgery on the left great toe 4 days ago. At that time she had toenail cut back short.     Past Medical History:   Diagnosis Date    Anemia     Chronic pain     Contact lens/glasses fitting     Finger pain, left     Frequent UTI     GERD (gastroesophageal reflux disease)     H/O eating disorder     remote    Hip fracture, left (HCC)     History of hormone replacement therapy     Hypertension     Leg length discrepancy     Migraines, neuralgic     Needs pre-anesthesia assessment     Patient reports a history of high tolerance to pain medicine and prior hisoty of drug use    Post herpetic neuralgia     Psychiatric disorder     PUD (peptic ulcer disease)     Raynaud's disease     Rheumatoid arthritis ([address]     Rheumatoid arthritis ([address]     Shingles 2014    Tooth abscess 10/6/15    Completed Amoxicillin        Past Surgical History:   Procedure Laterality Date    FOOT/TOES SURGERY PROC UNLISTED      HX COLONOSCOPY      HX GYN  2012    fibroids    HX ORTHOPAEDIC Left 2012    partial hip replacement    HX ORTHOPAEDIC  2009    attempt bunionectomy    HX ORTHOPAEDIC  10/2015    Dr. Martinez : [facility] [address] HX ORTHOPAEDIC  2014    redo left hip replacement    NEUROLOGICAL PROCEDURE UNLISTED Bilateral 2016    Thalamotomy          Family History:   Problem Relation Age of Onset    OSTEOARTHRITIS Other     Stroke Mother     Hypertension Mother        Social History     Socioeconomic History    Marital status:      Spouse name: Not on file    Number of children: Not on file    Years of education: Not on file    Highest education level: Not on file   Occupational History    Not on file   Tobacco Use    Smoking status: Former Smoker     Quit date: 2015     Years since quittin.4    Smokeless tobacco: Never Used   Vaping Use    Vaping Use: Never used   Substance and Sexual Activity    Alcohol use: No    Drug use: No     Comment: Prior history of drug use- pt denies    Sexual activity: Not on file   Other Topics Concern    Not on file   Social History Narrative    Not on file     Social Determinants of Health     Financial Resource Strain:     Difficulty of Paying Living Expenses: Not on file   Food Insecurity:     Worried About Running Out of Food in the Last Year: Not on file    Tee of Food in the Last Year: Not on file   Transportation Needs:     Lack of Transportation (Medical): Not on file    Lack of Transportation (Non-Medical): Not on file   Physical Activity:     Days of Exercise per Week: Not on file    Minutes of Exercise per Session: Not on file   Stress:     Feeling of Stress : Not on file   Social Connections:     Frequency of Communication with Friends and Family: Not on file    Frequency of Social Gatherings with Friends and Family: Not on file    Attends Confucianist Services: Not on file    Active Member of [address] [place] [address] or Organizations: Not on file    Attends Club or Organization Meetings: Not on file    Marital Status: Not on file   Intimate Partner Violence:     Fear of Current or Ex-Partner: Not on file    Emotionally Abused: Not on file    Physically Abused: Not on file    Sexually Abused: Not on file   Housing Stability:     Unable to Pay for Housing in the Last Year: Not on file    Number of [place] in the Last Year: Not on file    Unstable Housing in the Last Year: Not on file         ALLERGIES: Aspirin, Lyrica [pregabalin], Nsaids (non-steroidal anti-inflammatory drug), Sulfa (sulfonamide antibiotics), and Tetracycline    Review of Systems   Constitutional: Negative. Respiratory: Negative. Negative for shortness of breath. Cardiovascular: Negative. Gastrointestinal: Negative.     Musculoskeletal:        Left great toe pain       Vitals:    01/07/22 1932   BP: (!) 138/97 Pulse: 79   Resp: 18   Temp: 98.8 °F (37.1 °C)   SpO2: 98%   Weight: 49 kg (108 lb)   Height: 5' 2\" (1.575 m)            Physical Exam  Constitutional:       General: She is not in acute distress. Cardiovascular:      Rate and Rhythm: Normal rate and regular rhythm. Pulmonary:      Effort: Pulmonary effort is normal.      Breath sounds: Normal breath sounds. Musculoskeletal:      Comments: LEFT FOOT: left great toe: (+) short toenail that is growing left lateral distal nailbed. (+) erythema and edema around left lateral distal nailbed. No flatulence. Normal pulses and sensory. Neurological:      Mental Status: She is alert. MDM  Number of Diagnoses or Management Options  Risk of Complications, Morbidity, and/or Mortality  Presenting problems: moderate  Diagnostic procedures: moderate  Management options: low    Patient Progress  Patient progress: improved         Procedures    Dx: cellulitis toe, developing paronychia    Disp: D/C home. F/U with Dr. Jamal Drummond tomorrow. RS: keflex and Vicodin. Return to ER prn. Dictation disclaimer:  Please note that this dictation was completed with [employer], the [employer] voice recognition software. Quite often unanticipated grammatical, syntax, homophones, and other interpretive errors are inadvertently transcribed by the computer software. Please disregard these errors. Please excuse any errors that have escaped final proofreading.

## 2022-01-08 NOTE — ED NOTES
Patient given ordered medications. Discharge instructions reviewed with patient and understanding verbalized. Patient exits through waiting area.

## 2022-01-10 ENCOUNTER — OFFICE VISIT (OUTPATIENT)
Dept: ORTHOPEDIC SURGERY | Age: 57
End: 2022-01-10
Payer: MEDICARE

## 2022-01-10 VITALS — WEIGHT: 108 LBS | TEMPERATURE: 97 F | BODY MASS INDEX: 19.75 KG/M2

## 2022-01-10 DIAGNOSIS — M96.89 NONUNION OF OSTEOTOMY SITE: ICD-10-CM

## 2022-01-10 DIAGNOSIS — Z98.890 POST-OPERATIVE STATE: ICD-10-CM

## 2022-01-10 DIAGNOSIS — L03.032 PARONYCHIA, TOE, LEFT: Primary | ICD-10-CM

## 2022-01-10 PROCEDURE — G8427 DOCREV CUR MEDS BY ELIG CLIN: HCPCS | Performed by: ORTHOPAEDIC SURGERY

## 2022-01-10 PROCEDURE — 73630 X-RAY EXAM OF FOOT: CPT | Performed by: ORTHOPAEDIC SURGERY

## 2022-01-10 PROCEDURE — 3017F COLORECTAL CA SCREEN DOC REV: CPT | Performed by: ORTHOPAEDIC SURGERY

## 2022-01-10 PROCEDURE — G8420 CALC BMI NORM PARAMETERS: HCPCS | Performed by: ORTHOPAEDIC SURGERY

## 2022-01-10 PROCEDURE — 11765 WEDGE EXCISION SKN NAIL FOLD: CPT | Performed by: ORTHOPAEDIC SURGERY

## 2022-01-10 PROCEDURE — G8432 DEP SCR NOT DOC, RNG: HCPCS | Performed by: ORTHOPAEDIC SURGERY

## 2022-01-10 PROCEDURE — 99213 OFFICE O/P EST LOW 20 MIN: CPT | Performed by: ORTHOPAEDIC SURGERY

## 2022-01-10 RX ORDER — HYDROCODONE BITARTRATE AND ACETAMINOPHEN 5; 325 MG/1; MG/1
1 TABLET ORAL
Qty: 12 TABLET | Refills: 0 | Status: SHIPPED | OUTPATIENT
Start: 2022-01-10 | End: 2022-11-04

## 2022-01-10 NOTE — PROGRESS NOTES
AMBULATORY PROGRESS NOTE      Patient: Sania King             MRN: [id number]     SSN: [id number] Body mass index is 19.75 kg/m². YOB: 1965     AGE: 61 y.o. EX: female    PCP: Kylah Mckeon MD       IMPRESSION //  DIAGNOSIS AND TREATMENT PLAN        Sania King has a diagnosis of:      She is ingrown, of the lateral portion of the left great toe nail plate. There is some swelling, redness, no gross pus, but is deftly ingrowing. DIAGNOSES    1. Paronychia, toe, left    2. Nonunion of osteotomy site    3. Post-operative state        Orders Placed This Encounter    TRIM NAIL(S)    Generic Supply Order     Hard sole shoe will be given to the patient    AMB POC XRAY, FOOT; COMPLETE, 3+ VIEW     ASK ALL FEMALE PATIENTS IF PREGNANT     Order Specific Question:   Reason for Exam     Answer:   PAIN            PLAN:    1. I will remove part of her nail plate to help fix ingrown nail: Procedure listed as below: This is a removal of 4 mL of the lateral portion of the left great toenail plate that was ingrowing on the lateral side of the soft tissues ingrowing toenail plate: No signs of felon. Early paronychia is present  2. I will advise the patient to use Neosporin  3. I anticipate ordering an MRI of her left foot. 4. DME: Hard sole shoe will be given to the patient    RTO: 1 week    There are no Patient Instructions on file for this visit. Please follow up with your PCP for any health maintenance as recommended         Sania King  expresses understanding of the diagnosis, treatment plan, and all of their proposed questions were answered to their satisfaction. Patient education has been provided re the diagnoses.          HPI //  Brian 13 A 61 y.o. female who is a/an  established patient, presenting to my outpatient office for evaluation of  the following chief complaint(s):     Chief Complaint   Patient presents with    Toe Pain     At [facility] [address] presented w/ nonunion of osteotomy. DME: Custom accommodated orthotic. She had surgery, on August 6, 2021, left great toe: Removal of hardware, from the left great toe, irrigation debridement of left great toe, really 1 screw that still remains, in the metaphyseal region of the distal portion of the metatarsal of this left foot. She apparently, had gone to the emergency room, this past weekend, on on Friday evening, January 7, 2022. She was diagnosed of having an ingrowing toenail plate/paronychia of her great toe, is placed on antibiotics, Keflex. The medical records indicate, that she had her toenail plate cut back short. Medical record states that she had \"surgery on her left great toe 4 days ago\". Evaluation by the ER doc, on 7/20/2022, show that the left great toe had a short toenail that was growing ingrowing on the lateral distal nail bed with some erythema and edema to the left lateral distal nail bed no fluctuance. Normal pulses. The impression was cellulitis of the toe, developing paronychia she is placed on Keflex antibiotics and given Vicodin narcotic analgesic. With her this past weekend, on 1/9/2021, and she informed me that her toe was swollen reddened with some slight decrease in the redness and she started antibiotics, however I instructed her to follow-up with me ASAP, on Monday, 1/10/2022. On her August 2021 surgery, her cultures are listed as below: She was evaluated by Dr. Hieu Garcia, and was placed on antibiotics[de-identified] She was  on doxycycline 100 g, 1 p.o. twice a day, for 6 weeks duration.     CULTURE, Luana Bright  Order: [id number]   Collected 8/6/2021 13:57     Status: Final result    Specimen Information: Foot, left         0 Result Notes     Ref Range & Units 8/6/21 1357 Resulting Agency   Special Requests:  PROXIMAL [facility] AT [address] [id number] [facility]   Culture result:   RARE MIXED STAPHYLOCOCCUS SPECIES, COAGULASE NEGATIVE Abnormal   [address]              Specimen Collected: 08/06/21 13:57 Last Resulted               Visit Vitals  Temp 97 °F (36.1 °C) (Temporal)   Wt 108 lb (49 kg)   BMI 19.75 kg/m²       Appearance: Alert, well appearing and pleasant patient who is in no distress, oriented to person, place/time, and who follows commands. Normal dress/motor activity/thought processes/memory. This patient is accompanied in the examination room by her  self. Patient arrives to office via: without assistive device:     Psychiatric:  Normal Affect/mood. Judgement, behavior, and conduct are appropriate. Speech normal in context and clarity, memory intact grossly, no involuntary movements - tremors. H EENT (2): Head normocephalic & atraumatic. Eye: pupils are round// EOM are intact // Neck: ROM WNL  // Hearings Intact   Respiratory: Breathing non labored     ANKLE/FOOT left    Gait: normal  Tenderness: mild lateral, left great toe, as there is ingrowing of the lateral portion of the left great toenail plate    Cutaneous: moderate swelling and pus at the lateral fold of her nail plate. Joint Motion: More motion, left great toe, secondary to MTP arthrodesis, and nonunion left great toe IP region WNL   Joint / Tendon Stability:  No Ankle or Subtalar instability or joint laxity. No peroneal sublux ability or dislocation  Alignment: August, left great toe IP region// pes planovalgus alignment, is present  Neuro Motor/Sensory: NL/NL  Vascular: NL foot/ankle pulses,   Lymphatics: No extremity lymphedema, No calf swelling, no tenderness to calf muscles. CHART REVIEW     Henrry Boswell has been experiencing pain and discomfort confirmed as outlined in the pain assessment outlined below.  was reviewed by Chidi Shay MD on 1/10/2022.      Pain Assessment  1/10/2022   Location of Pain Toe   Pain Location Comment -   Location Modifiers Left   Severity of Pain 7   Quality of Pain Throbbing;Burning   Quality of Pain Comment -   Duration of Pain Persistent   Frequency of Pain Constant   Frequency of Pain Comment -   Date Pain First Started -   Aggravating Factors -   Aggravating Factors Comment -   Limiting Behavior Yes   Relieving Factors Other (Comment)   Relieving Factors Comment prescribed medications   Result of Injury -        Karan Coffman  has a past medical history of Anemia, Chronic pain, Contact lens/glasses fitting, Finger pain, left, Frequent UTI (2006), GERD (gastroesophageal reflux disease), H/O eating disorder, Hip fracture, left ([address] (2000), History of hormone replacement therapy, Hypertension, Leg length discrepancy, Migraines, neuralgic, Needs pre-anesthesia assessment, Post herpetic neuralgia, Psychiatric disorder, PUD (peptic ulcer disease), Raynaud's disease, Rheumatoid arthritis ([address] Rheumatoid arthritis [facility]), Shingles (April 2014), and Tooth abscess (10/6/15).      Patients is employed at:         Past Medical History:   Diagnosis Date    Anemia     Chronic pain     Contact lens/glasses fitting     Finger pain, left     Frequent UTI 2006    GERD (gastroesophageal reflux disease)     H/O eating disorder     remote    Hip fracture, left ([address] 2000    History of hormone replacement therapy     Hypertension     Leg length discrepancy     Migraines, neuralgic     Needs pre-anesthesia assessment     Patient reports a history of high tolerance to pain medicine and prior hisoty of drug use    Post herpetic neuralgia     Psychiatric disorder     PUD (peptic ulcer disease)     Raynaud's disease     Rheumatoid arthritis ([address]     Rheumatoid arthritis ([address]     Shingles April 2014    Tooth abscess 10/6/15    Completed Amoxicillin      Past Surgical History:   Procedure Laterality Date    FOOT/TOES SURGERY PROC UNLISTED      HX COLONOSCOPY      HX GYN 2012    fibroids    HX ORTHOPAEDIC Left 2012    partial hip replacement    HX ORTHOPAEDIC  2009    attempt bunionectomy    HX ORTHOPAEDIC  10/2015    Dr. Judd Record: Luma Osuna HX ORTHOPAEDIC  2014    redo left hip replacement    NEUROLOGICAL PROCEDURE UNLISTED Bilateral 2016    Thalamotomy      Current Outpatient Medications   Medication Sig    cephALEXin (Keflex) 500 mg capsule Take 1 Capsule by mouth four (4) times daily for 10 days.  HYDROcodone-acetaminophen (NORCO) 5-325 mg per tablet Take 1 Tablet by mouth every six (6) hours as needed for Pain for up to 7 days. Max Daily Amount: 4 Tablets. Take 1 tablets PO every 6 hours as needed for pain control. If over the counter ibuprofen or acetaminophen was suggested, then only take the vicodin for pain not well controlled with the over the counter medication.  polyethylene glycol (Miralax) 17 gram packet Take 1 Packet by mouth daily. (Patient taking differently: Take 17 g by mouth as needed.)    levETIRAcetam (Keppra) 1,000 mg tablet Take 1,500 mg by mouth daily. Indications: Neuralgia    gabapentin (NEURONTIN) 600 mg tablet Take 600 mg by mouth three (3) times daily.  methotrexate (RHEUMATREX) 2.5 mg tablet Take 15 mg by mouth every Sunday.  folic acid (FOLVITE) 1 mg tablet Take  by mouth daily.  hydrOXYchloroQUINE (PLAQUENIL) 200 mg tablet Take 200 mg by mouth two (2) times a day.  azaTHIOprine (Imuran) 50 mg tablet Take 50 mg by mouth three (3) times daily.  prochlorperazine (COMPAZINE) 10 mg tablet Take 5 mg by mouth every six (6) hours as needed.  dextroamphetamine-amphetamine (ADDERALL) 10 mg tablet Take 10 mg by mouth two (2) times a day.  medroxyPROGESTERone (PROVERA) 5 mg tablet Take 5 mg by mouth daily.  imipramine (TOFRANIL) 25 mg tablet Take 300 mg by mouth nightly.  CALCIUM CARBONATE/VITAMIN D3 (CALCIUM+D PO) Take  by mouth.  multivitamin (ONE A DAY) tablet Take 1 Tab by mouth daily.  estradiol (ESTRACE) 0.5 mg tablet Take  by mouth daily.  dronabinol (MARINOL) 5 mg capsule Take 10 mg by mouth two (2) times daily as needed. Indications: HEADACHE    carvedilol (COREG) 6.25 mg tablet Take 6.25 mg by mouth two (2) times a day.  alendronate (FOSAMAX) 70 mg tablet Take  by mouth every .  venlafaxine-SR (EFFEXOR XR) 150 mg capsule Take  by mouth daily.  baclofen (LIORESAL) 10 mg tablet Take 20 mg by mouth four (4) times daily.  pantoprazole (PROTONIX) 40 mg tablet Take 40 mg by mouth daily. Twice a day    HYDROcodone-acetaminophen (Norco)  mg tablet Take 1 Tablet by mouth every six (6) hours as needed for Pain for up to 7 days. Max Daily Amount: 4 Tablets. (Patient not taking: Reported on 1/10/2022)     No current facility-administered medications for this visit.      Allergies   Allergen Reactions    Aspirin Unknown (comments)     Severe stomach pain and bleeding    Lyrica [Pregabalin] Other (comments)     Slurred speech    Nsaids (Non-Steroidal Anti-Inflammatory Drug) Other (comments)     Hx  Of bleeding ulcers    Sulfa (Sulfonamide Antibiotics) Rash    Tetracycline Hives     Social History     Occupational History    Not on file   Tobacco Use    Smoking status: Former Smoker     Quit date: 2015     Years since quittin.4    Smokeless tobacco: Never Used   Vaping Use    Vaping Use: Never used   Substance and Sexual Activity    Alcohol use: No    Drug use: No     Comment: Prior history of drug use- pt denies    Sexual activity: Not on file     Family History   Problem Relation Age of Onset    OSTEOARTHRITIS Other     Stroke Mother     Hypertension Mother         DIAGNOSTIC LAB DATA      No results found for: HBA1C, ZAX8ZGVQ, BHG7DCGA //   Lab Results   Component Value Date/Time    Glucose 81 2021 01:14 PM        No results found for: OUW0MIWZ, CXZ2ECWI      Lab Results   Component Value Date/Time    Vitamin D 25-Hydroxy 43.8 2021 01:14 PM        Drug Screen Most Recent Result Date     DRUG SCREEN, URINE  Collected: 12/18/2015  6:48 AM (Final result)    Complete Results                  REVIEW OF SYSTEMS : 1/10/2022  ALL BELOW ARE Negative except : SEE HPI     All other systems reviewed and are negative. 12 point review of systems otherwise negative unless noted in HPI. RADIOGRAPHS// IMAGING//DIAGNOSTIC DATA     Orders Placed This Encounter    TRIM NAIL(S)    Generic Supply Order    AMB POC XRAY, FOOT; COMPLETE, 3+ VIEW         foot X-rays, 3 views, left foot: Views, AP/LAT/OBL completed 1/10/2022 AT [address] [place] [address]    X-rays reveal   no acute fracture, dislocation or subluxation noted. Overall alignment is   acceptable. Soft tissue swelling is moderate swelling, left great toe noted. No osteolytic or osteoblastic lesions noted. Mineralization suggests yes osteopenia. Degenerative changes are  MTP joints, status post remote metatarsal head resections for from a remote rheumatoid arthritic rheumatoid forefoot reconstruction in the past: Resection arthroplasty changes are seen to the second third fourth and fifth MTP joint regions. There is a screw in the #4 toe, traversing the IP joints, of the fourth toe. noted. Calcified vessels are not present. .  There is a screw, retained screw, across the solid MTP arthrodesis. Is a nonunion, of the left great toe distal phalanx IP region. There are some swelling of the soft tissues, seen to this left great toe, medially laterally, circumferentially. I see no cortical lucencies or osteolytic regions along the medial great toe. There is a ghost tract in the great toe where she had hardware in the great toe that traversed IP region at the nonunited IP region:       I have personally reviewed the images of the above study.  The interpretation of this study is my professional opinion    PROCEDURE: TOE NAIL PLATE TRIMMING       Bouchra Zhangson provided verbal consent for a callous shaving procedure for 1/10/2022. The procedure was explained to the patient and possible adverse reactions were discussed. Risks and Benefits explained to the patient:included, but not limited to: bleeding, infection, local skin irritation. Patient and/or family questions answered    * Procedure site verified and marked as necessary  * Patient was positioned for comfort  * Verbal Informed Consent Verified by myself and my office staff. * TIME OUT performed immediately prior to start of procedure:    Great toe, has ingrowing of the nail plate. There are some slight redness to the lateral skin and soft tissues, soft tissue near the left great toenail plate is is ingrowing. There is no expressible pus, but there is redness, in this region, at this current time, mild. As such, after written and removed 4 mm of the lateral nail plate, first undermined the lateral consent was obtained, written consent was obtained as carefully removed and trimmed portion of the nail plate, and then removing this carefully. W I did not disrupt the underlying matrix, without any complications. Ozzie Montanez tolerated the procedure well and was advised on the signs of infection and instructed to go to the ER or call the office if Ozzie Montanez becomes concerned about the area being infected. Patient received JOSSELYN (After visit instructions). I have spent 20 minutes reviewing the previous notes, reviewing diagnostic studies [Advanced  Imaging, Diagnostic test results (x-rays)] and had a direct face to face with the patient discussing the diagnosis and importance of compliance with the treatment and plan. There is  discussion for the potential for surgery, answering all questions, as well as documenting patient care coordination for this individual on the day of the visit.           Disclaimer:     Sections of this note are dictated using utilizing voice recognition software, which may have resulted in some phonetic based errors in grammar and contents. Even though attempts were made to correct all the mistakes, some may have been missed, and remained in the body of the document. If questions arise, please contact our department. An electronic signature was used to authenticate this note. Chasity Robles may have a reminder for a \"due or due soon\" health maintenance. I have asked that she contact her primary care provider for follow-up on this health maintenance. There are no Patient Instructions on file for this visit. Please follow up with your PCP for any health maintenance as recommended. Kim Negrete,as dictated by, Kelly Lou.   1/10/2022  8:27 AM

## 2022-01-18 ENCOUNTER — OFFICE VISIT (OUTPATIENT)
Dept: ORTHOPEDIC SURGERY | Age: 57
End: 2022-01-18
Payer: MEDICARE

## 2022-01-18 VITALS
OXYGEN SATURATION: 100 % | BODY MASS INDEX: 20.5 KG/M2 | TEMPERATURE: 97.5 F | WEIGHT: 111.4 LBS | HEIGHT: 62 IN | HEART RATE: 97 BPM

## 2022-01-18 DIAGNOSIS — M79.641 RIGHT HAND PAIN: ICD-10-CM

## 2022-01-18 DIAGNOSIS — L03.032 PARONYCHIA, TOE, LEFT: Primary | ICD-10-CM

## 2022-01-18 PROCEDURE — G8510 SCR DEP NEG, NO PLAN REQD: HCPCS | Performed by: ORTHOPAEDIC SURGERY

## 2022-01-18 PROCEDURE — 99213 OFFICE O/P EST LOW 20 MIN: CPT | Performed by: ORTHOPAEDIC SURGERY

## 2022-01-18 PROCEDURE — 3017F COLORECTAL CA SCREEN DOC REV: CPT | Performed by: ORTHOPAEDIC SURGERY

## 2022-01-18 PROCEDURE — G8427 DOCREV CUR MEDS BY ELIG CLIN: HCPCS | Performed by: ORTHOPAEDIC SURGERY

## 2022-01-18 PROCEDURE — G8420 CALC BMI NORM PARAMETERS: HCPCS | Performed by: ORTHOPAEDIC SURGERY

## 2022-01-18 NOTE — PROGRESS NOTES
AMBULATORY PROGRESS NOTE      Patient: Giselle Johnson             MRN: [id number]     SSN: [id number] Body mass index is 20.38 kg/m². YOB: 1965     AGE: 61 y.o. EX: female    PCP: Ramana Cope MD       IMPRESSION //  DIAGNOSIS AND TREATMENT PLAN        Giselle Johnson has a diagnosis of: So she is has a resolving, ingrowing of the left great toenail. No longer tender redness to the nail plate fold, and there is no pus or drainage, no expressible drainage. Plans have her continue doing a Band-Aid, reassess her in 3 weeks time. Keep the foot clean and dry    She has rheumatoid arthritis and does see Dr. Srinivasa Ambriz, and also does take numerous medications for her rheumatoid arthritis. She does see a dermatologist, for her hands. She tells me, and then and in evaluating her right hand, she has had redness to her right ring finger and baby finger, at the nail skin interface, and has been using Band-Aids on this area, using local dermatologic antibiotic ointment, as prescribed by her dermatologist.  In assessing these fingers, looks like there may be some early ingrowing of the nail plates of the right ring finger, and right baby finger: I have her see Dr. Heather Li for reassessment. DIAGNOSES    1. Paronychia, toe, left    2. Right hand pain        Orders Placed This Encounter    EMPL [facility] Cantu Hand Ref [facility]     Referral Priority:   Routine     Referral Type:   Consultation     Referral Reason:   Specialty Services Required     Referred to Provider:   Irineo Syed DO     Requested Specialty:   Hand Surgery     Number of Visits Requested:   1            PLAN:    1. I continue using a Band-aid on her left Great toe  2. Referral to Dr. Heather Li for right hand pain & cellulitis  3. I advise her to f/u jesse/ Srinivasa Ambriz     RTO : 1 month    There are no Patient Instructions on file for this visit.         Please follow up with your PCP for any health maintenance as recommended         Siddhartha Sommer  expresses understanding of the diagnosis, treatment plan, and all of their proposed questions were answered to their satisfaction. Patient education has been provided re the diagnoses. HPI //  36 Ollie Horta IS A 61 y.o. female who is a/an  established patient, presenting to my outpatient office for evaluation of  the following chief complaint(s):     Chief Complaint   Patient presents with    Foot Pain     Left       At [facility] [address] presented w/ paronychia of left Great toe. I will remove part of her nail plate to help fix ingrown nail: Procedure listed as below: This is a removal of 4 mL of the lateral portion of the left great toenail plate that was ingrowing on the lateral side of the soft tissues ingrowing toenail plate: No signs of felon. Early paronychia is present. I will advise the patient to use Neosporin. I anticipate ordering an MRI of her left foot. DME: Hard sole shoe will be given to the patient. Since LOV Siddhartha Sommer presents today w/ sharp pain at the distal end of her left Great toe. She states she has cellulitis of her hands onset 1 year. She has been taking antibiotics , but they don't seem to be helping. She has seen an Dermatologist for her cellulitis and they usually prescribe antibiotics. Carter Sabillon put her on Amoxicillin for her the rash on her hands ,but she developed Thrush. Visit Vitals  Pulse 97   Temp 97.5 °F (36.4 °C) (Temporal)   Ht 5' 2\" (1.575 m)   Wt 111 lb 6.4 oz (50.5 kg)   SpO2 100%   BMI 20.38 kg/m²       Appearance: Alert, well appearing and pleasant patient who is in no distress, oriented to person, place/time, and who follows commands. Normal dress/motor activity/thought processes/memory. This patient is accompanied in the examination room by her  self. Patient arrives to office via: with assistive device: hard sole shoe    Psychiatric:  Normal Affect/mood.   Judgement, behavior, and conduct are appropriate. Speech normal in context and clarity, memory intact grossly, no involuntary movements - tremors. H EENT (2): Head normocephalic & atraumatic. Eye: pupils are round// EOM are intact // Neck: ROM WNL  // Hearings Intact   Respiratory: Breathing non labored     ANKLE/FOOT left    Gait: uses assistive device hard sole shoe  Tenderness: mild over  Left Great Toe  Cutaneous: Healing skin interface, nail plates interface, after partial removal of the nail plate, a week ago   Joint Motion: Limited great toe range of motion WNL   Joint / Tendon Stability:  No Ankle or Subtalar instability or joint laxity. No peroneal sublux ability or dislocation  Alignment: neutral Hindfoot,    Neuro Motor/Sensory: NL/NL  Vascular: NL foot/ankle pulses,   Lymphatics: No extremity lymphedema, No calf swelling, no tenderness to calf muscles. CHART REVIEW     Claritza Diaz has been experiencing pain and discomfort confirmed as outlined in the pain assessment outlined below.  was reviewed by Angel Luis Gutierrez MD on 1/18/2022. Pain Assessment  1/18/2022   Location of Pain Foot   Pain Location Comment -   Location Modifiers Left   Severity of Pain 4   Quality of Pain Sharp; Throbbing   Quality of Pain Comment -   Duration of Pain A few minutes   Frequency of Pain Intermittent   Frequency of Pain Comment -   Date Pain First Started -   Aggravating Factors Standing;Walking   Aggravating Factors Comment -   Limiting Behavior Some   Relieving Factors Rest   Relieving Factors Comment -   Result of Injury No        Claritza Diaz  has a past medical history of Anemia, Chronic pain, Contact lens/glasses fitting, Finger pain, left, Frequent UTI (2006), GERD (gastroesophageal reflux disease), H/O eating disorder, Hip fracture, left ([facility] [address] (2000), History of hormone replacement therapy, Hypertension, Leg length discrepancy, Migraines, neuralgic, Needs pre-anesthesia assessment, Post herpetic neuralgia, Psychiatric disorder, PUD (peptic ulcer disease), Raynaud's disease, Rheumatoid arthritis ([facility] [address] Rheumatoid arthritis [facility]), Shingles (April 2014), and Tooth abscess (10/6/15). Patients is employed at:         Past Medical History:   Diagnosis Date    Anemia     Chronic pain     Contact lens/glasses fitting     Finger pain, left     Frequent UTI 2006    GERD (gastroesophageal reflux disease)     H/O eating disorder     remote    Hip fracture, left ([facility] [address] 2000    History of hormone replacement therapy     Hypertension     Leg length discrepancy     Migraines, neuralgic     Needs pre-anesthesia assessment     Patient reports a history of high tolerance to pain medicine and prior hisoty of drug use    Post herpetic neuralgia     Psychiatric disorder     PUD (peptic ulcer disease)     Raynaud's disease     Rheumatoid arthritis ([facility] [address]     Rheumatoid arthritis ([facility] [address]     Shingles April 2014    Tooth abscess 10/6/15    Completed Amoxicillin      Past Surgical History:   Procedure Laterality Date    FOOT/TOES SURGERY PROC UNLISTED      HX COLONOSCOPY      HX GYN  2012    fibroids    HX ORTHOPAEDIC Left 2012    partial hip replacement    HX ORTHOPAEDIC  2009    attempt bunionectomy    HX ORTHOPAEDIC  10/2015    Dr. Reg Horton: First Metatarsophylangeal Fusion,etc    HX ORTHOPAEDIC  2014    redo left hip replacement    NEUROLOGICAL PROCEDURE UNLISTED Bilateral 2016    Thalamotomy      Current Outpatient Medications   Medication Sig    polyethylene glycol (Miralax) 17 gram packet Take 1 Packet by mouth daily. (Patient taking differently: Take 17 g by mouth as needed.)    levETIRAcetam (Keppra) 1,000 mg tablet Take 1,500 mg by mouth daily. Indications: Neuralgia    gabapentin (NEURONTIN) 600 mg tablet Take 600 mg by mouth three (3) times daily.  methotrexate (RHEUMATREX) 2.5 mg tablet Take 15 mg by mouth every Sunday.     folic acid (FOLVITE) 1 mg tablet Take by mouth daily.  hydrOXYchloroQUINE (PLAQUENIL) 200 mg tablet Take 200 mg by mouth two (2) times a day.  azaTHIOprine (Imuran) 50 mg tablet Take 50 mg by mouth three (3) times daily.  prochlorperazine (COMPAZINE) 10 mg tablet Take 5 mg by mouth every six (6) hours as needed.  dextroamphetamine-amphetamine (ADDERALL) 10 mg tablet Take 10 mg by mouth two (2) times a day.  medroxyPROGESTERone (PROVERA) 5 mg tablet Take 5 mg by mouth daily.  imipramine (TOFRANIL) 25 mg tablet Take 300 mg by mouth nightly.  CALCIUM CARBONATE/VITAMIN D3 (CALCIUM+D PO) Take  by mouth.  multivitamin (ONE A DAY) tablet Take 1 Tab by mouth daily.  estradiol (ESTRACE) 0.5 mg tablet Take  by mouth daily.  dronabinol (MARINOL) 5 mg capsule Take 10 mg by mouth two (2) times daily as needed. Indications: HEADACHE    carvedilol (COREG) 6.25 mg tablet Take 6.25 mg by mouth two (2) times a day.  alendronate (FOSAMAX) 70 mg tablet Take  by mouth every Sunday.  venlafaxine-SR (EFFEXOR XR) 150 mg capsule Take  by mouth daily.  baclofen (LIORESAL) 10 mg tablet Take 20 mg by mouth four (4) times daily.  pantoprazole (PROTONIX) 40 mg tablet Take 40 mg by mouth daily. Twice a day    HYDROcodone-acetaminophen (NORCO) 5-325 mg per tablet Take 1 Tablet by mouth every six (6) hours as needed for Pain for up to 3 days. Max Daily Amount: 4 Tablets. Take 1 tablets PO every 6 hours as needed for pain control. If over the counter ibuprofen or acetaminophen was suggested, then only take the vicodin for pain not well controlled with the over the counter medication. No current facility-administered medications for this visit.      Allergies   Allergen Reactions    Aspirin Unknown (comments)     Severe stomach pain and bleeding    Lyrica [Pregabalin] Other (comments)     Slurred speech    Nsaids (Non-Steroidal Anti-Inflammatory Drug) Other (comments)     Hx  Of bleeding ulcers    Sulfa (Sulfonamide Antibiotics) Rash  Tetracycline Hives     Social History     Occupational History    Not on file   Tobacco Use    Smoking status: Former Smoker     Quit date: 2015     Years since quittin.5    Smokeless tobacco: Never Used   Vaping Use    Vaping Use: Never used   Substance and Sexual Activity    Alcohol use: No    Drug use: No     Comment: Prior history of drug use- pt denies    Sexual activity: Not on file     Family History   Problem Relation Age of Onset    OSTEOARTHRITIS Other     Stroke Mother     Hypertension Mother         DIAGNOSTIC LAB DATA      No results found for: HBA1C, LSO7XNPL, [id number] //   Lab Results   Component Value Date/Time    Glucose 81 2021 01:14 PM        No results found for: DGV4AYMT, XAU9VHXJ      Lab Results   Component Value Date/Time    Vitamin D 25-Hydroxy 43.8 2021 01:14 PM        Drug Screen Most Recent Result Date     DRUG SCREEN, URINE  Collected: 2015  6:48 AM (Final result)    Complete Results                  REVIEW OF SYSTEMS : 2022  ALL BELOW ARE Negative except : SEE HPI     All other systems reviewed and are negative. 12 point review of systems otherwise negative unless noted in HPI. RADIOGRAPHS// IMAGING//DIAGNOSTIC DATA     Orders Placed This Encounter    EMPL [facility] Hand Ref [facility]        I have personally reviewed the images of the above study. The interpretation of this study is my professional opinion            I have spent 15 minutes reviewing the previous notes, reviewing diagnostic studies [Advanced  Imaging, Diagnostic test results (x-rays)] and had a direct face to face with the patient discussing the diagnosis and importance of compliance with the treatment and plan. There is  discussion for the potential for surgery, answering all questions, as well as documenting patient care coordination for this individual on the day of the visit.           Disclaimer:     Sections of this note are dictated using utilizing voice recognition software, which may have resulted in some phonetic based errors in grammar and contents. Even though attempts were made to correct all the mistakes, some may have been missed, and remained in the body of the document. If questions arise, please contact our department. An electronic signature was used to authenticate this note. Sowmya Bruce may have a reminder for a \"due or due soon\" health maintenance. I have asked that she contact her primary care provider for follow-up on this health maintenance. There are no Patient Instructions on file for this visit. Please follow up with your PCP for any health maintenance as recommended.                 Ting Chaudhary MD  1/18/2022  7:54 AM

## 2022-03-02 ENCOUNTER — OFFICE VISIT (OUTPATIENT)
Dept: ORTHOPEDIC SURGERY | Age: 57
End: 2022-03-02
Payer: MEDICARE

## 2022-03-02 VITALS
BODY MASS INDEX: 19.31 KG/M2 | RESPIRATION RATE: 18 BRPM | OXYGEN SATURATION: 98 % | HEART RATE: 74 BPM | WEIGHT: 109 LBS | TEMPERATURE: 97.6 F | HEIGHT: 63 IN

## 2022-03-02 DIAGNOSIS — M96.89 NONUNION OF OSTEOTOMY SITE: Primary | ICD-10-CM

## 2022-03-02 DIAGNOSIS — L03.032 PARONYCHIA, TOE, LEFT: ICD-10-CM

## 2022-03-02 DIAGNOSIS — M19.172 POST-TRAUMATIC OSTEOARTHRITIS OF LEFT FOOT: ICD-10-CM

## 2022-03-02 DIAGNOSIS — Q70.22 FUSION OF TOES OF LEFT FOOT: ICD-10-CM

## 2022-03-02 PROCEDURE — G8420 CALC BMI NORM PARAMETERS: HCPCS | Performed by: ORTHOPAEDIC SURGERY

## 2022-03-02 PROCEDURE — G8427 DOCREV CUR MEDS BY ELIG CLIN: HCPCS | Performed by: ORTHOPAEDIC SURGERY

## 2022-03-02 PROCEDURE — G8432 DEP SCR NOT DOC, RNG: HCPCS | Performed by: ORTHOPAEDIC SURGERY

## 2022-03-02 PROCEDURE — 3017F COLORECTAL CA SCREEN DOC REV: CPT | Performed by: ORTHOPAEDIC SURGERY

## 2022-03-02 PROCEDURE — 99213 OFFICE O/P EST LOW 20 MIN: CPT | Performed by: ORTHOPAEDIC SURGERY

## 2022-03-02 NOTE — PROGRESS NOTES
AMBULATORY PROGRESS NOTE      Patient: Bin Copeland             MRN: [id number]     SSN: [id number] Body mass index is 19.31 kg/m². YOB: 1965     AGE: 61 y.o. EX: female    PCP: Poornima Devi MD       IMPRESSION //  DIAGNOSIS AND TREATMENT PLAN        Bin Copeland has a diagnosis of:        Issues of early ingrowing of medial portion of the left great toe nail plate, which is a vertical nail plate orientation: See Dr. Terry Mcclelland for this. She also has some residual    Tenderness to the plantar forefoot where she had her prior forefoot rheumatoid reconstruction with metatarsal heads 2, 3, 4, 5 metatarsal heads in the remote past, and not recommended orthotic to offload her forefoot. She unfortunately, had developed infection in her left great toe IP region, and just to reiterate, she had hardware removed and had antibiotics, toe has drifted back into valgus at the IP region. No surgical invention, recommended at this current time. She had very poor quality left great toe great toe distal phalanx    DIAGNOSES    1. Nonunion of osteotomy site    2. Fusion of toes of left foot    3. Post-traumatic osteoarthritis of left foot    4. Paronychia, toe, left        Orders Placed This Encounter    Generic Supply Order     Bilateral Custom Trilaminar  Orthotics w/ goal off load forefoot    REFERRAL TO PODIATRY     Referral Priority:   Routine     Referral Type:   Consultation     Referral Reason:   Specialty Services Required     Referred to Provider:   Dorothea Bush DPM     Number of Visits Requested:   1            PLAN:    1. Referral to Dr. Mojgan Fernando  2. DME: Custom trilaminar orthotics w/ goal to offload forefoot    RTO  2 months    There are no Patient Instructions on file for this visit.         Please follow up with your PCP for any health maintenance as recommended         Bin Copeland  expresses understanding of the diagnosis, treatment plan, and all of their proposed questions were answered to their satisfaction. Patient education has been provided re the diagnoses. HPI //  36 Ollie Horta IS A 61 y.o. female who is a/an  established patient, presenting to my outpatient office for evaluation of  the following chief complaint(s):     Chief Complaint   Patient presents with    Toe Pain     left toe pain       At [facility] [address] presented w/ left paronychia. I continue using a Band-aid on her left Great toe   Referral to Dr. Dmitri Warner for right hand pain & cellulitis. I advise her to f/u w/ Porfirio Beal. Since LOV Sowmya Bruce continues to endorse left foot pain. She states the bottom of her foot is swollen all the way down. Visit Vitals  Pulse 74   Temp 97.6 °F (36.4 °C) (Temporal)   Resp 18   Ht 5' 3\" (1.6 m)   Wt 109 lb (49.4 kg)   SpO2 98%   BMI 19.31 kg/m²       Appearance: Alert, well appearing and pleasant patient who is in no distress, oriented to person, place/time, and who follows commands. Normal dress/motor activity/thought processes/memory. This patient is accompanied in the examination room by her  self. Patient arrives to office via: without assistive device:     Psychiatric:  Normal Affect/mood. Judgement, behavior, and conduct are appropriate. Speech normal in context and clarity, memory intact grossly, no involuntary movements - tremors. H EENT (2): Head normocephalic & atraumatic. Eye: pupils are round// EOM are intact // Neck: ROM WNL  // Hearings Intact   Respiratory: Breathing non labored     ANKLE/FOOT left    Gait: normal  Tenderness: mild over  plantar region and fourth metatarsal and left Great toe  Cutaneous: swelling to right great toe & vertical orientation medial part of toenail place  Joint Motion: WNL   Joint / Tendon Stability:  No Ankle or Subtalar instability or joint laxity.                        No peroneal sublux ability or dislocation  Alignment: Hindfoot,   Valgus to right great toe IP  Neuro Motor/Sensory: NL/NL  Vascular: NL foot/ankle pulses,   Lymphatics: No extremity lymphedema, No calf swelling, no tenderness to calf muscles. CHART REVIEW     Stormy Bunn has been experiencing pain and discomfort confirmed as outlined in the pain assessment outlined below.  was reviewed by Augie Blake MD on 3/2/2022. Pain Assessment  3/2/2022   Location of Pain Toe   Pain Location Comment -   Location Modifiers Left   Severity of Pain 0   Quality of Pain -   Quality of Pain Comment -   Duration of Pain A few hours   Frequency of Pain Intermittent   Frequency of Pain Comment -   Date Pain First Started -   Aggravating Factors -   Aggravating Factors Comment -   Limiting Behavior -   Relieving Factors -   Relieving Factors Comment -   Result of Injury No        Stormy Bunn  has a past medical history of Anemia, Chronic pain, Contact lens/glasses fitting, Finger pain, left, Frequent UTI (2006), GERD (gastroesophageal reflux disease), H/O eating disorder, Hip fracture, left ([address] (2000), History of hormone replacement therapy, Hypertension, Leg length discrepancy, Migraines, neuralgic, Needs pre-anesthesia assessment, Post herpetic neuralgia, Psychiatric disorder, PUD (peptic ulcer disease), Raynaud's disease, Rheumatoid arthritis ([address] Rheumatoid arthritis ([address] Shingles (April 2014), and Tooth abscess (10/6/15).      Patients is employed at:          has a past medical history of Anemia, Chronic pain, Contact lens/glasses fitting, Finger pain, left, Frequent UTI (2006), GERD (gastroesophageal reflux disease), H/O eating disorder, Hip fracture, left ([address] (2000), History of hormone replacement therapy, Hypertension, Leg length discrepancy, Migraines, neuralgic, Needs pre-anesthesia assessment, Post herpetic neuralgia, Psychiatric disorder, PUD (peptic ulcer disease), Raynaud's disease, Rheumatoid arthritis ([address] Rheumatoid arthritis [facility]), Shingles (April 2014), and Tooth abscess (10/6/15). has a past surgical history that includes hx gyn (2012); neurological procedure unlisted (Bilateral, 2016); hx colonoscopy; foot/toes surgery proc unlisted; hx orthopaedic (Left, 2012); hx orthopaedic (2009); hx orthopaedic (10/2015); and hx orthopaedic (2014). family history includes Hypertension in her mother; OSTEOARTHRITIS in an other family member; Stroke in her mother. Current Outpatient Medications   Medication Sig    polyethylene glycol (Miralax) 17 gram packet Take 1 Packet by mouth daily. (Patient taking differently: Take 17 g by mouth as needed.)    levETIRAcetam (Keppra) 1,000 mg tablet Take 1,500 mg by mouth daily. Indications: Neuralgia    gabapentin (NEURONTIN) 600 mg tablet Take 600 mg by mouth three (3) times daily.  methotrexate (RHEUMATREX) 2.5 mg tablet Take 15 mg by mouth every Sunday.  folic acid (FOLVITE) 1 mg tablet Take  by mouth daily.  hydrOXYchloroQUINE (PLAQUENIL) 200 mg tablet Take 200 mg by mouth two (2) times a day.  azaTHIOprine (Imuran) 50 mg tablet Take 50 mg by mouth three (3) times daily.  prochlorperazine (COMPAZINE) 10 mg tablet Take 5 mg by mouth every six (6) hours as needed.  dextroamphetamine-amphetamine (ADDERALL) 10 mg tablet Take 10 mg by mouth two (2) times a day.  medroxyPROGESTERone (PROVERA) 5 mg tablet Take 5 mg by mouth daily.  imipramine (TOFRANIL) 25 mg tablet Take 300 mg by mouth nightly.  CALCIUM CARBONATE/VITAMIN D3 (CALCIUM+D PO) Take  by mouth.  multivitamin (ONE A DAY) tablet Take 1 Tab by mouth daily.  estradiol (ESTRACE) 0.5 mg tablet Take  by mouth daily.  dronabinol (MARINOL) 5 mg capsule Take 10 mg by mouth two (2) times daily as needed. Indications: HEADACHE    carvedilol (COREG) 6.25 mg tablet Take 6.25 mg by mouth two (2) times a day.  alendronate (FOSAMAX) 70 mg tablet Take  by mouth every Sunday.  venlafaxine-SR (EFFEXOR XR) 150 mg capsule Take  by mouth daily.     baclofen (LIORESAL) 10 mg tablet Take 20 mg by mouth four (4) times daily.  pantoprazole (PROTONIX) 40 mg tablet Take 40 mg by mouth daily. Twice a day    HYDROcodone-acetaminophen (NORCO) 5-325 mg per tablet Take 1 Tablet by mouth every six (6) hours as needed for Pain for up to 3 days. Max Daily Amount: 4 Tablets. Take 1 tablets PO every 6 hours as needed for pain control. If over the counter ibuprofen or acetaminophen was suggested, then only take the vicodin for pain not well controlled with the over the counter medication. No current facility-administered medications for this visit. Allergies   Allergen Reactions    Aspirin Unknown (comments)     Severe stomach pain and bleeding    Lyrica [Pregabalin] Other (comments)     Slurred speech    Nsaids (Non-Steroidal Anti-Inflammatory Drug) Other (comments)     Hx  Of bleeding ulcers    Sulfa (Sulfonamide Antibiotics) Rash    Tetracycline Hives     Social History     Occupational History    Not on file   Tobacco Use    Smoking status: Former Smoker     Quit date: 2015     Years since quittin.6    Smokeless tobacco: Never Used   Vaping Use    Vaping Use: Never used   Substance and Sexual Activity    Alcohol use: No    Drug use: No     Comment: Prior history of drug use- pt denies    Sexual activity: Not on file       reports that she quit smoking about 6 years ago. She has never used smokeless tobacco. She reports that she does not drink alcohol and does not use drugs.       DIAGNOSTIC LAB DATA      No results found for: HBA1C, [id number], [id number] //   Lab Results   Component Value Date/Time    Glucose 81 2021 01:14 PM        No results found for: [id number], [id number]      Lab Results   Component Value Date/Time    Vitamin D 25-Hydroxy 43.8 2021 01:14 PM        Drug Screen Most Recent Result Date     DRUG SCREEN, URINE  Collected: 2015  6:48 AM (Final result)    Complete Results                  REVIEW OF SYSTEMS : 3/2/2022  ALL BELOW ARE Negative except : SEE HPI     All other systems reviewed and are negative. 12 point review of systems otherwise negative unless noted in HPI. RADIOGRAPHS// IMAGING//DIAGNOSTIC DATA     Orders Placed This Encounter    Generic Supply Order    REFERRAL TO PODIATRY         . I have personally reviewed the images of the above study. The interpretation of this study is my professional opinion             I have spent 25 minutes reviewing the previous notes, reviewing diagnostic studies [Advanced  Imaging, Diagnostic test results (x-rays)] and had a direct face to face with the patient discussing the diagnosis and importance of compliance with the treatment and plan. There is  discussion for the potential for surgery, answering all questions, as well as documenting patient care coordination for this individual on the day of the visit. Disclaimer:     Sections of this note are dictated using utilizing voice recognition software, which may have resulted in some phonetic based errors in grammar and contents. Even though attempts were made to correct all the mistakes, some may have been missed, and remained in the body of the document. If questions arise, please contact our department. An electronic signature was used to authenticate this note. Lynn Conner may have a reminder for a \"due or due soon\" health maintenance. I have asked that she contact her primary care provider for follow-up on this health maintenance. There are no Patient Instructions on file for this visit. Please follow up with your PCP for any health maintenance as recommended.               Cathryn Rabago, as dictated byPk  3/2/2022  7:43 AM

## 2022-03-08 ENCOUNTER — DOCUMENTATION ONLY (OUTPATIENT)
Dept: ORTHOPEDIC SURGERY | Age: 57
End: 2022-03-08

## 2022-03-08 NOTE — PROGRESS NOTES
Patient called to request the order for the orthotic be faxed to [facility]. I called Sean to verify fax number and faxed as requested to [phone]. For information only.

## 2022-04-07 ENCOUNTER — OFFICE VISIT (OUTPATIENT)
Dept: ORTHOPEDIC SURGERY | Age: 57
End: 2022-04-07
Payer: MEDICARE

## 2022-04-07 VITALS
BODY MASS INDEX: 19 KG/M2 | HEIGHT: 63 IN | OXYGEN SATURATION: 98 % | TEMPERATURE: 98.3 F | WEIGHT: 107.2 LBS | HEART RATE: 93 BPM

## 2022-04-07 DIAGNOSIS — L03.011 CHRONIC PARONYCHIA OF FINGER OF RIGHT HAND: Primary | ICD-10-CM

## 2022-04-07 PROCEDURE — 99214 OFFICE O/P EST MOD 30 MIN: CPT | Performed by: ORTHOPAEDIC SURGERY

## 2022-04-07 PROCEDURE — 3017F COLORECTAL CA SCREEN DOC REV: CPT | Performed by: ORTHOPAEDIC SURGERY

## 2022-04-07 PROCEDURE — G8427 DOCREV CUR MEDS BY ELIG CLIN: HCPCS | Performed by: ORTHOPAEDIC SURGERY

## 2022-04-07 PROCEDURE — G8432 DEP SCR NOT DOC, RNG: HCPCS | Performed by: ORTHOPAEDIC SURGERY

## 2022-04-07 PROCEDURE — 73130 X-RAY EXAM OF HAND: CPT | Performed by: ORTHOPAEDIC SURGERY

## 2022-04-07 PROCEDURE — G8420 CALC BMI NORM PARAMETERS: HCPCS | Performed by: ORTHOPAEDIC SURGERY

## 2022-04-07 NOTE — PROGRESS NOTES
Jennifer Hanna is a 61 y.o. female right handed unspecified employment. Worker's Compensation and legal considerations: none filed. Vitals:    04/07/22 1513   Pulse: 93   Temp: 98.3 °F (36.8 °C)   TempSrc: Temporal   SpO2: 98%   Weight: 107 lb 3.2 oz (48.6 kg)   Height: 5' 3\" (1.6 m)   PainSc:   0 - No pain   PainLoc: Hand           Chief Complaint   Patient presents with    Hand Pain     RIGHT HAND FOLLOW UP          HPI: Patient presents today with concerns of deformity and redness around the area of skin proximal to her nail plate. She says it has been going on for many years. 6/4/2021 HPI: Patient presents today with complaints of popping of her left little finger. She notices that it hyperextends and then she is stuck with it bent at the tip. Additionally her left index finger nailbed has become more more deformed. Initial HPI: Patient presents today with complaints of left little finger pain 2 months after spraining it. She also reports chronic symptoms about her eponychial him on her index finger proximal to the nail on the left as well as similar symptoms on the right. She has seen a dermatologist twice for this in the past but did not follow-up. She also has a history of Raynaud's.     Date of onset: December 2020    Injury: Yes: Comment: Fall    Prior Treatment:  Yes: Comment: Urgent care for left little fingerAnd antibiotics for left index finger    Numbness/ Tingling: No      ROS: Review of Systems - General ROS: negative  Psychological ROS: negative  ENT ROS: negative  Allergy and Immunology ROS: negative  Hematological and Lymphatic ROS: negative  Respiratory ROS: no cough, shortness of breath, or wheezing  Cardiovascular ROS: no chest pain or dyspnea on exertion  Gastrointestinal ROS: no abdominal pain, change in bowel habits, or black or bloody stools  Musculoskeletal ROS: positive for - pain in finger - bilateral  Neurological ROS: negative  Dermatological ROS: negative    Past Medical History:   Diagnosis Date    Anemia     Chronic pain     Contact lens/glasses fitting     Finger pain, left     Frequent UTI 2006    GERD (gastroesophageal reflux disease)     H/O eating disorder     remote    Hip fracture, left ([address] 2000    History of hormone replacement therapy     Hypertension     Leg length discrepancy     Migraines, neuralgic     Needs pre-anesthesia assessment     Patient reports a history of high tolerance to pain medicine and prior hisoty of drug use    Post herpetic neuralgia     Psychiatric disorder     PUD (peptic ulcer disease)     Raynaud's disease     [facility] arthritis [address] [address]     Rheumatoid arthritis ([facility] [address]     Shingles April 2014    Tooth abscess 10/6/15    Completed Amoxicillin        Past Surgical History:   Procedure Laterality Date    FOOT/TOES SURGERY PROC UNLISTED      HX COLONOSCOPY      HX GYN  2012    fibroids    HX ORTHOPAEDIC Left 2012    partial hip replacement    HX ORTHOPAEDIC  2009    attempt bunionectomy    HX ORTHOPAEDIC  10/2015    Dr. Lawrence Cuellar: Kera Bond HX ORTHOPAEDIC  2014    redo left hip replacement    NEUROLOGICAL PROCEDURE UNLISTED Bilateral 2016    Thalamotomy        Current Outpatient Medications   Medication Sig Dispense Refill    polyethylene glycol (Miralax) 17 gram packet Take 1 Packet by mouth daily. (Patient taking differently: Take 17 g by mouth as needed.) 10 Packet 1    levETIRAcetam (Keppra) 1,000 mg tablet Take 1,500 mg by mouth daily. Indications: Neuralgia      gabapentin (NEURONTIN) 600 mg tablet Take 600 mg by mouth three (3) times daily.  methotrexate (RHEUMATREX) 2.5 mg tablet Take 15 mg by mouth every Sunday.  folic acid (FOLVITE) 1 mg tablet Take  by mouth daily.  hydrOXYchloroQUINE (PLAQUENIL) 200 mg tablet Take 200 mg by mouth two (2) times a day.  azaTHIOprine (Imuran) 50 mg tablet Take 50 mg by mouth three (3) times daily.       prochlorperazine (COMPAZINE) 10 mg tablet Take 5 mg by mouth every six (6) hours as needed.  dextroamphetamine-amphetamine (ADDERALL) 10 mg tablet Take 10 mg by mouth two (2) times a day.  medroxyPROGESTERone (PROVERA) 5 mg tablet Take 5 mg by mouth daily.  imipramine (TOFRANIL) 25 mg tablet Take 300 mg by mouth nightly.  CALCIUM CARBONATE/VITAMIN D3 (CALCIUM+D PO) Take  by mouth.  multivitamin (ONE A DAY) tablet Take 1 Tab by mouth daily.  estradiol (ESTRACE) 0.5 mg tablet Take  by mouth daily.  dronabinol (MARINOL) 5 mg capsule Take 10 mg by mouth two (2) times daily as needed. Indications: HEADACHE      carvedilol (COREG) 6.25 mg tablet Take 6.25 mg by mouth two (2) times a day.  alendronate (FOSAMAX) 70 mg tablet Take  by mouth every Sunday.  venlafaxine-SR (EFFEXOR XR) 150 mg capsule Take  by mouth daily.  baclofen (LIORESAL) 10 mg tablet Take 20 mg by mouth four (4) times daily.  pantoprazole (PROTONIX) 40 mg tablet Take 40 mg by mouth daily. Twice a day      HYDROcodone-acetaminophen (NORCO) 5-325 mg per tablet Take 1 Tablet by mouth every six (6) hours as needed for Pain for up to 3 days. Max Daily Amount: 4 Tablets. Take 1 tablets PO every 6 hours as needed for pain control. If over the counter ibuprofen or acetaminophen was suggested, then only take the vicodin for pain not well controlled with the over the counter medication. 12 Tablet 0       Allergies   Allergen Reactions    Aspirin Unknown (comments)     Severe stomach pain and bleeding    Lyrica [Pregabalin] Other (comments)     Slurred speech    Nsaids (Non-Steroidal Anti-Inflammatory Drug) Other (comments)     Hx  Of bleeding ulcers    Sulfa (Sulfonamide Antibiotics) Rash    Tetracycline Hives           PE:     Physical Exam  Vitals and nursing note reviewed. Constitutional:       General: She is not in acute distress. Appearance: Normal appearance. She is not ill-appearing.    Cardiovascular: Pulses: Normal pulses. Pulmonary:      Effort: Pulmonary effort is normal. No respiratory distress. Abdominal:      General: Abdomen is flat. There is no distension. Musculoskeletal:         General: Tenderness present. No swelling, deformity or signs of injury. Cervical back: Normal range of motion and neck supple. Right lower leg: No edema. Left lower leg: No edema. Skin:     General: Skin is warm and dry. Capillary Refill: Capillary refill takes less than 2 seconds. Findings: No bruising or erythema. Neurological:      General: No focal deficit present. Mental Status: She is alert and oriented to person, place, and time. Cranial Nerves: No cranial nerve deficit. Sensory: No sensory deficit. Psychiatric:         Mood and Affect: Mood normal.         Behavior: Behavior normal.            Right hand: There is erythema around the eponychial of all digits as well as some elevation of the eponychial fold at the level of the germinal matrix. Neurovascularly intact. There is no purulence or area of abscess. This likely represents a chronic paronychial infection. Imagin/1/2021 films of bilateral hands does not show any fracture or dislocation. There is an old healed fracture of the fifth metacarpal on the left side. Additionally there is noted soft tissue edema about the PIP joint of the left small finger. ICD-10-CM ICD-9-CM    1. 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  I recommended Betadine solution mixed with water soaks for a month before discussing operative treatment. Follow-up and Dispositions    · Return if symptoms worsen or fail to improve.           Plan was reviewed with patient, who verbalized agreement and understanding of the plan

## 2022-09-09 ENCOUNTER — OFFICE VISIT (OUTPATIENT)
Dept: ORTHOPEDIC SURGERY | Age: 57
End: 2022-09-09
Payer: MEDICARE

## 2022-09-09 VITALS
TEMPERATURE: 96.9 F | HEART RATE: 86 BPM | HEIGHT: 63 IN | WEIGHT: 105 LBS | BODY MASS INDEX: 18.61 KG/M2 | OXYGEN SATURATION: 100 %

## 2022-09-09 DIAGNOSIS — L03.011 PARONYCHIA OF RIGHT LITTLE FINGER: ICD-10-CM

## 2022-09-09 DIAGNOSIS — L03.011 PARONYCHIA OF RIGHT RING FINGER: ICD-10-CM

## 2022-09-09 DIAGNOSIS — L03.011: Primary | ICD-10-CM

## 2022-09-09 DIAGNOSIS — L03.011 PARONYCHIA OF RIGHT MIDDLE FINGER: ICD-10-CM

## 2022-09-09 DIAGNOSIS — L03.011 CHRONIC PARONYCHIA OF FINGER OF RIGHT HAND: ICD-10-CM

## 2022-09-09 PROCEDURE — 3017F COLORECTAL CA SCREEN DOC REV: CPT | Performed by: ORTHOPAEDIC SURGERY

## 2022-09-09 PROCEDURE — G8432 DEP SCR NOT DOC, RNG: HCPCS | Performed by: ORTHOPAEDIC SURGERY

## 2022-09-09 PROCEDURE — G8427 DOCREV CUR MEDS BY ELIG CLIN: HCPCS | Performed by: ORTHOPAEDIC SURGERY

## 2022-09-09 PROCEDURE — G8420 CALC BMI NORM PARAMETERS: HCPCS | Performed by: ORTHOPAEDIC SURGERY

## 2022-09-09 PROCEDURE — 99214 OFFICE O/P EST MOD 30 MIN: CPT | Performed by: ORTHOPAEDIC SURGERY

## 2022-09-09 NOTE — PROGRESS NOTES
Waleska Mcmahon is a 61 y.o. female right handed unspecified employment. Worker's Compensation and legal considerations: none filed. Vitals:    09/09/22 1127   Pulse: 86   Temp: 96.9 °F (36.1 °C)   TempSrc: Temporal   SpO2: 100%   Weight: 105 lb (47.6 kg)   Height: 5' 3\" (1.6 m)   PainSc:   6   PainLoc: Finger           Chief Complaint   Patient presents with    Hand Pain     Chronic infection of nailbeds right hand       HPI: Patient presents today due to persistence and recurrence of chronic right paronychial infections. She has tried Betadine and bleach soaks. She has also tried antibiotics. 4/7/2022 HPI: Patient presents today with concerns of deformity and redness around the area of skin proximal to her nail plate. She says it has been going on for many years. 6/4/2021 HPI: Patient presents today with complaints of popping of her left little finger. She notices that it hyperextends and then she is stuck with it bent at the tip. Additionally her left index finger nailbed has become more more deformed. Initial HPI: Patient presents today with complaints of left little finger pain 2 months after spraining it. She also reports chronic symptoms about her eponychial him on her index finger proximal to the nail on the left as well as similar symptoms on the right. She has seen a dermatologist twice for this in the past but did not follow-up. She also has a history of Raynaud's.     Date of onset: December 2020    Injury: Yes: Comment: Fall    Prior Treatment:  Yes: Comment: Urgent care for left little fingerAnd antibiotics for left index finger    Numbness/ Tingling: No      ROS: Review of Systems - General ROS: negative  Psychological ROS: negative  ENT ROS: negative  Allergy and Immunology ROS: negative  Hematological and Lymphatic ROS: negative  Respiratory ROS: no cough, shortness of breath, or wheezing  Cardiovascular ROS: no chest pain or dyspnea on exertion  Gastrointestinal ROS: no abdominal pain, change in bowel habits, or black or bloody stools  Musculoskeletal ROS: positive for - pain in finger - bilateral  Neurological ROS: negative  Dermatological ROS: negative    Past Medical History:   Diagnosis Date    Anemia     Chronic pain     Contact lens/glasses fitting     Finger pain, left     Frequent UTI 2006    GERD (gastroesophageal reflux disease)     H/O eating disorder     remote    Hip fracture, left ([facility] [address] 2000    History of hormone replacement therapy     Hypertension     Leg length discrepancy     Migraines, neuralgic     Needs pre-anesthesia assessment     Patient reports a history of high tolerance to pain medicine and prior hisoty of drug use    Post herpetic neuralgia     Psychiatric disorder     PUD (peptic ulcer disease)     Raynaud's disease     Rheumatoid arthritis ([facility] [address]     Rheumatoid arthritis ([facility] [address]     Shingles April 2014    Tooth abscess 10/6/15    Completed Amoxicillin        Past Surgical History:   Procedure Laterality Date    FOOT/TOES SURGERY PROC UNLISTED      HX COLONOSCOPY      HX GYN  2012    fibroids    HX ORTHOPAEDIC Left 2012    partial hip replacement    HX ORTHOPAEDIC  2009    attempt bunionectomy    HX ORTHOPAEDIC  10/2015    Dr. Sergio Valladares: [facility] Metatarsophylangeal Fusion,etc    HX ORTHOPAEDIC  2014    redo left hip replacement    NEUROLOGICAL PROCEDURE UNLISTED Bilateral 2016    Thalamotomy        Current Outpatient Medications   Medication Sig Dispense Refill    polyethylene glycol (Miralax) 17 gram packet Take 1 Packet by mouth daily. (Patient taking differently: Take 17 g by mouth as needed.) 10 Packet 1    levETIRAcetam (Keppra) 1,000 mg tablet Take 1,500 mg by mouth daily. Indications: Neuralgia      gabapentin (NEURONTIN) 600 mg tablet Take 600 mg by mouth three (3) times daily.  methotrexate (RHEUMATREX) 2.5 mg tablet Take 15 mg by mouth every Sunday.  folic acid (FOLVITE) 1 mg tablet Take  by mouth daily.       hydrOXYchloroQUINE (PLAQUENIL) 200 mg tablet Take 200 mg by mouth two (2) times a day.  azaTHIOprine (IMURAN) 50 mg tablet Take 50 mg by mouth three (3) times daily.  prochlorperazine (COMPAZINE) 10 mg tablet Take 5 mg by mouth every six (6) hours as needed.  dextroamphetamine-amphetamine (ADDERALL) 10 mg tablet Take 10 mg by mouth two (2) times a day.  medroxyPROGESTERone (PROVERA) 5 mg tablet Take 5 mg by mouth daily.  imipramine (TOFRANIL) 25 mg tablet Take 300 mg by mouth nightly.  CALCIUM CARBONATE/VITAMIN D3 (CALCIUM+D PO) Take  by mouth.  multivitamin (ONE A DAY) tablet Take 1 Tab by mouth daily.  estradiol (ESTRACE) 0.5 mg tablet Take  by mouth daily.  dronabinoL (MARINOL) 5 mg capsule Take 10 mg by mouth two (2) times daily as needed. Indications: HEADACHE      carvediloL (COREG) 6.25 mg tablet Take 6.25 mg by mouth two (2) times a day.  alendronate (FOSAMAX) 70 mg tablet Take  by mouth every Sunday.  venlafaxine-SR (EFFEXOR-XR) 150 mg capsule Take  by mouth daily.  baclofen (LIORESAL) 10 mg tablet Take 20 mg by mouth four (4) times daily.  pantoprazole (PROTONIX) 40 mg tablet Take 40 mg by mouth daily. Twice a day      HYDROcodone-acetaminophen (NORCO) 5-325 mg per tablet Take 1 Tablet by mouth every six (6) hours as needed for Pain for up to 3 days. Max Daily Amount: 4 Tablets. Take 1 tablets PO every 6 hours as needed for pain control. If over the counter ibuprofen or acetaminophen was suggested, then only take the vicodin for pain not well controlled with the over the counter medication.  12 Tablet 0       Allergies   Allergen Reactions    Aspirin Unknown (comments)     Severe stomach pain and bleeding    Lyrica [Pregabalin] Other (comments)     Slurred speech    Nsaids (Non-Steroidal Anti-Inflammatory Drug) Other (comments)     Hx  Of bleeding ulcers    Sulfa (Sulfonamide Antibiotics) Rash    Tetracycline Hives           PE: Physical Exam  Vitals and nursing note reviewed. Constitutional:       General: She is not in acute distress. Appearance: Normal appearance. She is not ill-appearing. Cardiovascular:      Pulses: Normal pulses. Pulmonary:      Effort: Pulmonary effort is normal. No respiratory distress. Musculoskeletal:         General: Tenderness present. No swelling, deformity or signs of injury. Normal range of motion. Cervical back: Normal range of motion and neck supple. Right lower leg: No edema. Left lower leg: No edema. Skin:     General: Skin is warm and dry. Capillary Refill: Capillary refill takes less than 2 seconds. Findings: Erythema present. No bruising. Neurological:      General: No focal deficit present. Mental Status: She is alert and oriented to person, place, and time. Cranial Nerves: No cranial nerve deficit. Sensory: No sensory deficit. Psychiatric:         Mood and Affect: Mood normal.         Behavior: Behavior normal.      Am essentially unchanged from previous:    Right hand: There is erythema around the eponychial of all digits as well as some elevation of the eponychial fold at the level of the germinal matrix. Neurovascularly intact. There is no purulence or area of abscess. This likely represents a chronic paronychial infection. Imagin/1/2021 films of bilateral hands does not show any fracture or dislocation. There is an old healed fracture of the fifth metacarpal on the left side. Additionally there is noted soft tissue edema about the PIP joint of the left small finger. ICD-10-CM ICD-9-CM    1. Chronic paronychia of thumb, right  L03.011 681.02 SCHEDULE SURGERY      2.  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 Paronychia of right middle finger  L03.011 681.02 SCHEDULE SURGERY      4. Paronychia of right ring finger  L03.011 681.02 SCHEDULE SURGERY      5. Paronychia of right little finger  L03.011 681.02 SCHEDULE SURGERY            Plan:     Schedule right thumb, middle, ring, and little finger debridement and marsupialization for chronic paronychial infections. Follow-up and Dispositions    Return for H&P.           Plan was reviewed with patient, who verbalized agreement and understanding of the plan

## 2022-09-09 NOTE — LETTER
9/9/2022    Patient: Leonid Lee   YOB: 1965   Date of Visit: 9/9/2022     Rik Callahan MD  [facility] [place] [address]  Via Fax: [phone]    Dear Rik Callahan MD,      Thank you for referring Ms. Lauri Bruce to [address] [place] [address] for evaluation. My notes for this consultation are attached. If you have questions, please do not hesitate to call me. I look forward to following your patient along with you.       Sincerely,    Maxwell Denney, DO

## 2022-10-28 ENCOUNTER — OFFICE VISIT (OUTPATIENT)
Dept: ORTHOPEDIC SURGERY | Age: 57
End: 2022-10-28

## 2022-10-28 VITALS
TEMPERATURE: 97.4 F | SYSTOLIC BLOOD PRESSURE: 112 MMHG | WEIGHT: 107 LBS | BODY MASS INDEX: 18.96 KG/M2 | HEIGHT: 63 IN | DIASTOLIC BLOOD PRESSURE: 72 MMHG

## 2022-10-28 DIAGNOSIS — L03.011 PARONYCHIA OF RIGHT LITTLE FINGER: ICD-10-CM

## 2022-10-28 DIAGNOSIS — L03.011: ICD-10-CM

## 2022-10-28 DIAGNOSIS — L03.011 PARONYCHIA OF RIGHT RING FINGER: ICD-10-CM

## 2022-10-28 DIAGNOSIS — Z01.818 PRE-OPERATIVE EXAMINATION: ICD-10-CM

## 2022-10-28 DIAGNOSIS — Z01.818 PRE-OPERATIVE EXAMINATION: Primary | ICD-10-CM

## 2022-10-28 DIAGNOSIS — L03.011 PARONYCHIA OF RIGHT MIDDLE FINGER: ICD-10-CM

## 2022-10-28 NOTE — PROGRESS NOTES
Ragini Velez is a 61 y.o. female right handed unspecified employment. Worker's Compensation and legal considerations: none filed. Vitals:    10/28/22 0909   BP: 112/72   Temp: 97.4 °F (36.3 °C)   TempSrc: Temporal   Weight: 107 lb (48.5 kg)   Height: 5' 3\" (1.6 m)   PainSc:   7   PainLoc: Hand           Chief Complaint   Patient presents with    Physical     Right thumb, middle , ring. And little finger debridement and marsupilazation       HPI: Patient presents today for preoperative visit she is scheduled for right thumb and middle ring and little finger debridement and marsupialization. She reports no changes since her last visit. 9/9/2022 HPI: Patient presents today due to persistence and recurrence of chronic right paronychial infections. She has tried Betadine and bleach soaks. She has also tried antibiotics. 4/7/2022 HPI: Patient presents today with concerns of deformity and redness around the area of skin proximal to her nail plate. She says it has been going on for many years. 6/4/2021 HPI: Patient presents today with complaints of popping of her left little finger. She notices that it hyperextends and then she is stuck with it bent at the tip. Additionally her left index finger nailbed has become more more deformed. Initial HPI: Patient presents today with complaints of left little finger pain 2 months after spraining it. She also reports chronic symptoms about her eponychial him on her index finger proximal to the nail on the left as well as similar symptoms on the right. She has seen a dermatologist twice for this in the past but did not follow-up. She also has a history of Raynaud's.     Date of onset: December 2020    Injury: Yes: Comment: Fall    Prior Treatment:  Yes: Comment: Urgent care for left little fingerAnd antibiotics for left index finger    Numbness/ Tingling: No      ROS: Review of Systems - General ROS: negative  Psychological ROS: negative  ENT ROS: negative  Allergy and Immunology ROS: negative  Hematological and Lymphatic ROS: negative  Respiratory ROS: no cough, shortness of breath, or wheezing  Cardiovascular ROS: no chest pain or dyspnea on exertion  Gastrointestinal ROS: no abdominal pain, change in bowel habits, or black or bloody stools  Musculoskeletal ROS: positive for - pain in finger - bilateral  Neurological ROS: negative  Dermatological ROS: negative    Past Medical History:   Diagnosis Date    Anemia     Chronic pain     Contact lens/glasses fitting     Finger pain, left     Frequent UTI 2006    GERD (gastroesophageal reflux disease)     H/O eating disorder     remote    Hip fracture, left ([facility] [address] 2000    History of hormone replacement therapy     Hypertension     Leg length discrepancy     Migraines, neuralgic     Needs pre-anesthesia assessment     Patient reports a history of high tolerance to pain medicine and prior hisoty of drug use    Post herpetic neuralgia     Psychiatric disorder     PUD (peptic ulcer disease)     Raynaud's disease     Rheumatoid arthritis ([facility] [address]     Rheumatoid arthritis ([facility] [address]     Shingles April 2014    Tooth abscess 10/6/15    Completed Amoxicillin        Past Surgical History:   Procedure Laterality Date    FOOT/TOES SURGERY PROC UNLISTED      HX COLONOSCOPY      HX GYN  2012    fibroids    HX ORTHOPAEDIC Left 2012    partial hip replacement    HX ORTHOPAEDIC  2009    attempt bunionectomy    HX ORTHOPAEDIC  10/2015    Dr. Broussard Lies: First Metatarsophylangeal Fusion,etc    HX ORTHOPAEDIC  2014    redo left hip replacement    NEUROLOGICAL PROCEDURE UNLISTED Bilateral 2016    Thalamotomy        Current Outpatient Medications   Medication Sig Dispense Refill    polyethylene glycol (Miralax) 17 gram packet Take 1 Packet by mouth daily. (Patient taking differently: Take 17 g by mouth as needed.) 10 Packet 1    levETIRAcetam (Keppra) 1,000 mg tablet Take 1,500 mg by mouth daily.  Indications: Neuralgia      gabapentin (NEURONTIN) 600 mg tablet Take 600 mg by mouth three (3) times daily. methotrexate (RHEUMATREX) 2.5 mg tablet Take 15 mg by mouth every Sunday. folic acid (FOLVITE) 1 mg tablet Take  by mouth daily. hydrOXYchloroQUINE (PLAQUENIL) 200 mg tablet Take 200 mg by mouth two (2) times a day. azaTHIOprine (IMURAN) 50 mg tablet Take 50 mg by mouth three (3) times daily. prochlorperazine (COMPAZINE) 10 mg tablet Take 5 mg by mouth every six (6) hours as needed. dextroamphetamine-amphetamine (ADDERALL) 10 mg tablet Take 10 mg by mouth two (2) times a day. medroxyPROGESTERone (PROVERA) 5 mg tablet Take 5 mg by mouth daily. imipramine (TOFRANIL) 25 mg tablet Take 300 mg by mouth nightly. CALCIUM CARBONATE/VITAMIN D3 (CALCIUM+D PO) Take  by mouth.      multivitamin (ONE A DAY) tablet Take 1 Tab by mouth daily. estradiol (ESTRACE) 0.5 mg tablet Take  by mouth daily. dronabinoL (MARINOL) 5 mg capsule Take 10 mg by mouth two (2) times daily as needed. Indications: HEADACHE      carvediloL (COREG) 6.25 mg tablet Take 6.25 mg by mouth two (2) times a day. alendronate (FOSAMAX) 70 mg tablet Take  by mouth every Sunday. venlafaxine-SR (EFFEXOR-XR) 150 mg capsule Take  by mouth daily. baclofen (LIORESAL) 10 mg tablet Take 20 mg by mouth four (4) times daily. pantoprazole (PROTONIX) 40 mg tablet Take 40 mg by mouth daily. Twice a day      HYDROcodone-acetaminophen (NORCO) 5-325 mg per tablet Take 1 Tablet by mouth every six (6) hours as needed for Pain for up to 3 days. Max Daily Amount: 4 Tablets. Take 1 tablets PO every 6 hours as needed for pain control. If over the counter ibuprofen or acetaminophen was suggested, then only take the vicodin for pain not well controlled with the over the counter medication.  12 Tablet 0       Allergies   Allergen Reactions    Aspirin Unknown (comments)     Severe stomach pain and bleeding    Lyrica [Pregabalin] Other (comments)     Slurred speech    Nsaids (Non-Steroidal Anti-Inflammatory Drug) Other (comments)     Hx  Of bleeding ulcers    Sulfa (Sulfonamide Antibiotics) Rash    Tetracycline Hives           PE:     Physical Exam  Vitals and nursing note reviewed. Constitutional:       General: She is not in acute distress. Appearance: Normal appearance. She is not ill-appearing, toxic-appearing or diaphoretic. HENT:      Head: Normocephalic and atraumatic. Nose: Nose normal.      Mouth/Throat:      Mouth: Mucous membranes are moist.   Eyes:      Extraocular Movements: Extraocular movements intact. Pupils: Pupils are equal, round, and reactive to light. Cardiovascular:      Pulses: Normal pulses. Pulmonary:      Effort: Pulmonary effort is normal. No respiratory distress. Abdominal:      General: Abdomen is flat. There is no distension. Musculoskeletal:         General: Tenderness present. No swelling, deformity or signs of injury. Normal range of motion. Cervical back: Normal range of motion and neck supple. Right lower leg: No edema. Left lower leg: No edema. Skin:     General: Skin is warm and dry. Capillary Refill: Capillary refill takes less than 2 seconds. Findings: Erythema present. No bruising. Neurological:      General: No focal deficit present. Mental Status: She is alert and oriented to person, place, and time. Cranial Nerves: No cranial nerve deficit. Sensory: No sensory deficit. Psychiatric:         Mood and Affect: Mood normal.         Behavior: Behavior normal.      Again essentially unchanged from previous:    Right hand: There is erythema around the eponychial of all digits as well as some elevation of the eponychial fold at the level of the germinal matrix. Neurovascularly intact. There is no purulence or area of abscess. This likely represents a chronic paronychial infection.     Imagin/1/2021 films of bilateral hands does not show any fracture or dislocation. There is an old healed fracture of the fifth metacarpal on the left side. Additionally there is noted soft tissue edema about the PIP joint of the left small finger. ICD-10-CM ICD-9-CM    1. Pre-operative examination  Y54.107 A56.76 METABOLIC PANEL, COMPREHENSIVE      CBC WITH AUTOMATED DIFF      EKG, 12 LEAD, INITIAL      2. Chronic paronychia of thumb, right  L03.011 681.02       3. Paronychia of right middle finger  L03.011 681.02       4. Paronychia of right ring finger  L03.011 681.02       5. Paronychia of right little finger  L03.011 681.02             Plan:     Proceed as scheduled for schedule right thumb, middle, ring, and little finger debridement and marsupialization    The patient was counseled at length about the risks of juan Covid-19 during their perioperative period and any recovery window from their procedure. The patient was made aware that juan Covid-19  may worsen their prognosis for recovering from their procedure and lend to a higher morbidity and/or mortality risk. All material risks, benefits, and reasonable alternatives including postponing the procedure were discussed. The patient does  wish to proceed with the procedure at this time. This procedure has been fully reviewed with the patient and written informed consent has been obtained. Follow-up and Dispositions    Return for postop.           Plan was reviewed with patient, who verbalized agreement and understanding of the plan

## 2022-10-28 NOTE — LETTER
10/28/2022    Patient: Sania King   YOB: 1965   Date of Visit: 10/28/2022     Misti Chowdhury MD  [address] [facility] [address] [place] [address]  Via Fax: [phone]    Dear Misti Chowdhury MD,      Thank you for referring Ms. Penny Wong to [address] for evaluation. My notes for this consultation are attached. If you have questions, please do not hesitate to call me. I look forward to following your patient along with you.       Sincerely,    Chirag Dang, DO

## 2022-11-04 PROBLEM — L03.011 PARONYCHIA OF RIGHT THUMB: Status: ACTIVE | Noted: 2022-11-04

## 2022-11-04 PROBLEM — L03.011 PARONYCHIA OF RIGHT MIDDLE FINGER: Status: ACTIVE | Noted: 2022-11-04

## 2022-11-04 PROBLEM — L03.011 PARONYCHIA OF RIGHT LITTLE FINGER: Status: ACTIVE | Noted: 2022-11-04

## 2022-11-04 PROBLEM — L03.011 PARONYCHIA OF RIGHT RING FINGER: Status: ACTIVE | Noted: 2022-11-04

## 2022-11-04 PROBLEM — L03.011 CHRONIC PARONYCHIA OF FINGER OF RIGHT HAND: Status: ACTIVE | Noted: 2022-11-04

## 2022-11-04 RX ORDER — PROCHLORPERAZINE MALEATE 5 MG
5 TABLET ORAL DAILY
COMMUNITY

## 2022-11-04 RX ORDER — BACLOFEN 20 MG/1
20 TABLET ORAL 4 TIMES DAILY
COMMUNITY
Start: 2022-08-26

## 2022-11-04 RX ORDER — IMIPRAMINE HYDROCHLORIDE 50 MG/1
150 TABLET, FILM COATED ORAL
COMMUNITY
Start: 2022-09-18

## 2022-11-04 RX ORDER — OXYCODONE AND ACETAMINOPHEN 7.5; 325 MG/1; MG/1
1 TABLET ORAL
COMMUNITY

## 2022-11-04 NOTE — DISCHARGE INSTRUCTIONS
Ice and Elevate operative wound/dressing. Begin moving fingers immediately after surgery. Keep dressing clean and dry. Cover for showering. Remove Dressing on Postop of day 3    Soak the finger in hydrogen peroxide and wash it with chlorhexidine gluconate skin cleanser (Hibiclens) three times daily, beginning on postoperative day 3. The daily washings are continued until all drainage stops. Cover with bandaids after washing. DISCHARGE SUMMARY from Nurse    PATIENT INSTRUCTIONS:    After general anesthesia or intravenous sedation, for 24 hours or while taking prescription Narcotics:  Limit your activities  Do not drive and operate hazardous machinery  Do not make important personal or business decisions  Do  not drink alcoholic beverages  If you have not urinated within 8 hours after discharge, please contact your surgeon on call. Report the following to your surgeon:  Excessive pain, swelling, redness or odor of or around the surgical area  Temperature over 100.5  Nausea and vomiting lasting longer than 4 hours or if unable to take medications  Any signs of decreased circulation or nerve impairment to extremity: change in color, persistent  numbness, tingling, coldness or increase pain  Any questions      These are general instructions for a healthy lifestyle:    No smoking/ No tobacco products/ Avoid exposure to second hand smoke  Surgeon General's Warning:  Quitting smoking now greatly reduces serious risk to your health. Obesity, smoking, and sedentary lifestyle greatly increases your risk for illness    A healthy diet, regular physical exercise & weight monitoring are important for maintaining a healthy lifestyle    You may be retaining fluid if you have a history of heart failure or if you experience any of the following symptoms:  Weight gain of 3 pounds or more overnight or 5 pounds in a week, increased swelling in our hands or feet or shortness of breath while lying flat in bed.   Please call your doctor as soon as you notice any of these symptoms; do not wait until your next office visit. The discharge information has been reviewed with the patient and spouse. The patient and spouse verbalized understanding. Discharge medications reviewed with the patient and spouse and appropriate educational materials and side effects teaching were provided.   ___________________________________________________________________________________________________________________________________

## 2022-11-04 NOTE — PERIOP NOTES
PRE-SURGICAL INSTRUCTIONS        Patient's Name:  Karan Coffman      WKFIG'X Date:  11/4/2022            Covid Testing Date and Time:    Surgery Date:  11/8/2022                Do NOT eat or drink anything, including candy, gum, or ice chips after midnight on 11/8, unless you have specific instructions from your surgeon or anesthesia provider to do so. You may brush your teeth before coming to the hospital.  No smoking 24 hours prior to the day of surgery. No alcohol 24 hours prior to the day of surgery. No recreational drugs for one week prior to the day of surgery. Leave all valuables, including money/purse, at home. Remove all jewelry, nail polish, acrylic nails, and makeup (including mascara); no lotions powders, deodorant, or perfume/cologne/after shave on the skin. Follow instruction for Hibiclens washes and CHG wipes from surgeon's office. Glasses/contact lenses and dentures may be worn to the hospital.  They will be removed prior to surgery. Call your doctor if symptoms of a cold or illness develop within 24-48 hours prior to your surgery. 11.  If you are having an outpatient procedure, please make arrangements for a responsible ADULT TO [address] [place] [address] and stay with you for 24 hours after your surgery. 12. ONE VISITOR in the hospital at this time for outpatient procedures. Exceptions may be made for surgical admissions, per nursing unit guidelines      Special Instructions:      Bring list of CURRENT medications. Bring any pertinent legal medical records. Take these medications the morning of surgery with a sip of water:  as directed by MD  Follow physician instructions about stopping anticoagulants. On the day of surgery, come in the main entrance of [facility]. Let the  at the desk know you are there for surgery. A staff member will come escort you to the surgical area on the second floor.     If you have any questions or concerns, please do not hesitate to call:     (Prior to the day of surgery) [facility] department:  [phone]   (Day of surgery) Pre-Op department:  [phone]    These surgical instructions were reviewed with Marcus Barnes during the [facility] phone call.

## 2022-11-04 NOTE — H&P
Cecilia Roberts is a 61 y.o. female right handed unspecified employment. Worker's Compensation and legal considerations: none filed. Vitals:     10/28/22 0909   BP: 112/72   Temp: 97.4 °F (36.3 °C)   TempSrc: Temporal   Weight: 107 lb (48.5 kg)   Height: 5' 3\" (1.6 m)   PainSc:   7   PainLoc: Hand                    Chief Complaint   Patient presents with    Physical       Right thumb, middle , ring. And little finger debridement and marsupilazation         HPI: Patient presents today for preoperative visit she is scheduled for right thumb and middle ring and little finger debridement and marsupialization. She reports no changes since her last visit. 9/9/2022 HPI: Patient presents today due to persistence and recurrence of chronic right paronychial infections. She has tried Betadine and bleach soaks. She has also tried antibiotics. 4/7/2022 HPI: Patient presents today with concerns of deformity and redness around the area of skin proximal to her nail plate. She says it has been going on for many years. 6/4/2021 HPI: Patient presents today with complaints of popping of her left little finger. She notices that it hyperextends and then she is stuck with it bent at the tip. Additionally her left index finger nailbed has become more more deformed. Initial HPI: Patient presents today with complaints of left little finger pain 2 months after spraining it. She also reports chronic symptoms about her eponychial him on her index finger proximal to the nail on the left as well as similar symptoms on the right. She has seen a dermatologist twice for this in the past but did not follow-up. She also has a history of Raynaud's.      Date of onset: December 2020     Injury: Yes: Comment: Fall     Prior Treatment:  Yes: Comment: Urgent care for left little fingerAnd antibiotics for left index finger     Numbness/ Tingling: No        ROS: Review of Systems - General ROS: negative  Psychological ROS: negative  ENT ROS: negative  Allergy and Immunology ROS: negative  Hematological and Lymphatic ROS: negative  Respiratory ROS: no cough, shortness of breath, or wheezing  Cardiovascular ROS: no chest pain or dyspnea on exertion  Gastrointestinal ROS: no abdominal pain, change in bowel habits, or black or bloody stools  Musculoskeletal ROS: positive for - pain in finger - bilateral  Neurological ROS: negative  Dermatological ROS: negative          Past Medical History:   Diagnosis Date    Anemia      Chronic pain      Contact lens/glasses fitting      Finger pain, left      Frequent UTI 2006    GERD (gastroesophageal reflux disease)      H/O eating disorder       remote    Hip fracture, left ([facility] [address] 2000    History of hormone replacement therapy      Hypertension      Leg length discrepancy      Migraines, neuralgic      Needs pre-anesthesia assessment       Patient reports a history of high tolerance to pain medicine and prior hisoty of drug use    Post herpetic neuralgia      Psychiatric disorder      PUD (peptic ulcer disease)      Raynaud's disease      Rheumatoid arthritis ([facility] [address]      Rheumatoid arthritis ([facility] [address]      Shingles April 2014    Tooth abscess 10/6/15     Completed Amoxicillin          Surgical History         Past Surgical History:   Procedure Laterality Date    FOOT/TOES SURGERY PROC UNLISTED        HX COLONOSCOPY        HX GYN   2012     fibroids    HX ORTHOPAEDIC Left 2012     partial hip replacement    HX ORTHOPAEDIC   2009     attempt bunionectomy    HX ORTHOPAEDIC   10/2015     Dr. Jose Salazar: First Metatarsophylangeal Fusion,etc    HX ORTHOPAEDIC   2014     redo left hip replacement    NEUROLOGICAL PROCEDURE UNLISTED Bilateral 2016     Thalamotomy                    Current Outpatient Medications   Medication Sig Dispense Refill    polyethylene glycol (Miralax) 17 gram packet Take 1 Packet by mouth daily.  (Patient taking differently: Take 17 g by mouth as needed.) 10 Packet 1    levETIRAcetam (Keppra) 1,000 mg tablet Take 1,500 mg by mouth daily. Indications: Neuralgia        gabapentin (NEURONTIN) 600 mg tablet Take 600 mg by mouth three (3) times daily. methotrexate (RHEUMATREX) 2.5 mg tablet Take 15 mg by mouth every Sunday. folic acid (FOLVITE) 1 mg tablet Take  by mouth daily. hydrOXYchloroQUINE (PLAQUENIL) 200 mg tablet Take 200 mg by mouth two (2) times a day. azaTHIOprine (IMURAN) 50 mg tablet Take 50 mg by mouth three (3) times daily. prochlorperazine (COMPAZINE) 10 mg tablet Take 5 mg by mouth every six (6) hours as needed. dextroamphetamine-amphetamine (ADDERALL) 10 mg tablet Take 10 mg by mouth two (2) times a day. medroxyPROGESTERone (PROVERA) 5 mg tablet Take 5 mg by mouth daily. imipramine (TOFRANIL) 25 mg tablet Take 300 mg by mouth nightly. CALCIUM CARBONATE/VITAMIN D3 (CALCIUM+D PO) Take  by mouth.        multivitamin (ONE A DAY) tablet Take 1 Tab by mouth daily. estradiol (ESTRACE) 0.5 mg tablet Take  by mouth daily. dronabinoL (MARINOL) 5 mg capsule Take 10 mg by mouth two (2) times daily as needed. Indications: HEADACHE        carvediloL (COREG) 6.25 mg tablet Take 6.25 mg by mouth two (2) times a day. alendronate (FOSAMAX) 70 mg tablet Take  by mouth every Sunday. venlafaxine-SR (EFFEXOR-XR) 150 mg capsule Take  by mouth daily. baclofen (LIORESAL) 10 mg tablet Take 20 mg by mouth four (4) times daily. pantoprazole (PROTONIX) 40 mg tablet Take 40 mg by mouth daily. Twice a day        HYDROcodone-acetaminophen (NORCO) 5-325 mg per tablet Take 1 Tablet by mouth every six (6) hours as needed for Pain for up to 3 days. Max Daily Amount: 4 Tablets. Take 1 tablets PO every 6 hours as needed for pain control. If over the counter ibuprofen or acetaminophen was suggested, then only take the vicodin for pain not well controlled with the over the counter medication.  12 Tablet 0 Allergies   Allergen Reactions    Aspirin Unknown (comments)       Severe stomach pain and bleeding    Lyrica [Pregabalin] Other (comments)       Slurred speech    Nsaids (Non-Steroidal Anti-Inflammatory Drug) Other (comments)       Hx  Of bleeding ulcers    Sulfa (Sulfonamide Antibiotics) Rash    Tetracycline Hives               PE:      Physical Exam  Vitals and nursing note reviewed. Constitutional:       General: She is not in acute distress. Appearance: Normal appearance. She is not ill-appearing, toxic-appearing or diaphoretic. HENT:      Head: Normocephalic and atraumatic. Nose: Nose normal.      Mouth/Throat:      Mouth: Mucous membranes are moist.   Eyes:      Extraocular Movements: Extraocular movements intact. Pupils: Pupils are equal, round, and reactive to light. Cardiovascular:      Pulses: Normal pulses. Pulmonary:      Effort: Pulmonary effort is normal. No respiratory distress. Abdominal:      General: Abdomen is flat. There is no distension. Musculoskeletal:         General: Tenderness present. No swelling, deformity or signs of injury. Normal range of motion. Cervical back: Normal range of motion and neck supple. Right lower leg: No edema. Left lower leg: No edema. Skin:     General: Skin is warm and dry. Capillary Refill: Capillary refill takes less than 2 seconds. Findings: Erythema present. No bruising. Neurological:      General: No focal deficit present. Mental Status: She is alert and oriented to person, place, and time. Cranial Nerves: No cranial nerve deficit. Sensory: No sensory deficit. Psychiatric:         Mood and Affect: Mood normal.         Behavior: Behavior normal.      Again essentially unchanged from previous:     Right hand: There is erythema around the eponychial of all digits as well as some elevation of the eponychial fold at the level of the germinal matrix. Neurovascularly intact.   There is no purulence or area of abscess. This likely represents a chronic paronychial infection. Imagin/1/2021 films of bilateral hands does not show any fracture or dislocation. There is an old healed fracture of the fifth metacarpal on the left side. Additionally there is noted soft tissue edema about the PIP joint of the left small finger. ICD-10-CM ICD-9-CM     1. Pre-operative examination  A08.776 I73.05 METABOLIC PANEL, COMPREHENSIVE         CBC WITH AUTOMATED DIFF         EKG, 12 LEAD, INITIAL       2. Chronic paronychia of thumb, right  L03.011 681.02         3. Paronychia of right middle finger  L03.011 681.02         4. Paronychia of right ring finger  L03.011 681.02         5. Paronychia of right little finger  L03.011 681.02                  Plan:      Proceed as scheduled for right thumb, middle, ring, and little finger debridement and marsupialization     The patient was counseled at length about the risks of juan Covid-19 during their perioperative period and any recovery window from their procedure. The patient was made aware that juan Covid-19  may worsen their prognosis for recovering from their procedure and lend to a higher morbidity and/or mortality risk. All material risks, benefits, and reasonable alternatives including postponing the procedure were discussed. The patient does  wish to proceed with the procedure at this time. This procedure has been fully reviewed with the patient and written informed consent has been obtained. Follow-up and Dispositions    Return for postop.             Plan was reviewed with patient, who verbalized agreement and understanding of the plan

## 2022-11-07 ENCOUNTER — ANESTHESIA EVENT (OUTPATIENT)
Dept: SURGERY | Age: 57
End: 2022-11-07
Payer: MEDICARE

## 2022-11-07 RX ORDER — ACETAMINOPHEN 500 MG
1000 TABLET ORAL ONCE
Status: CANCELLED | OUTPATIENT
Start: 2022-11-07 | End: 2022-11-07

## 2022-11-08 ENCOUNTER — HOSPITAL ENCOUNTER (OUTPATIENT)
Age: 57
Setting detail: OUTPATIENT SURGERY
Discharge: HOME | End: 2022-11-08
Attending: ORTHOPAEDIC SURGERY | Admitting: ORTHOPAEDIC SURGERY
Payer: MEDICARE

## 2022-11-08 ENCOUNTER — ANESTHESIA (OUTPATIENT)
Dept: SURGERY | Age: 57
End: 2022-11-08
Payer: MEDICARE

## 2022-11-08 VITALS
SYSTOLIC BLOOD PRESSURE: 126 MMHG | RESPIRATION RATE: 18 BRPM | WEIGHT: 104 LBS | HEART RATE: 77 BPM | BODY MASS INDEX: 18.43 KG/M2 | DIASTOLIC BLOOD PRESSURE: 81 MMHG | TEMPERATURE: 97.6 F | OXYGEN SATURATION: 100 % | HEIGHT: 63 IN

## 2022-11-08 DIAGNOSIS — L03.011 CHRONIC PARONYCHIA OF FINGER OF RIGHT HAND: Primary | ICD-10-CM

## 2022-11-08 PROCEDURE — 77030012422 HC DRN WND COVD -A: Performed by: ORTHOPAEDIC SURGERY

## 2022-11-08 PROCEDURE — 77030018836 HC SOL IRR NACL ICUM -A: Performed by: ORTHOPAEDIC SURGERY

## 2022-11-08 PROCEDURE — 76210000020 HC REC RM PH II FIRST 0.5 HR: Performed by: ORTHOPAEDIC SURGERY

## 2022-11-08 PROCEDURE — 87106 FUNGI IDENTIFICATION YEAST: CPT

## 2022-11-08 PROCEDURE — 87186 SC STD MICRODIL/AGAR DIL: CPT

## 2022-11-08 PROCEDURE — 77030040922 HC BLNKT HYPOTHRM STRY -A: Performed by: ORTHOPAEDIC SURGERY

## 2022-11-08 PROCEDURE — 00400 ANES INTEGUMENTARY SYS NOS: CPT | Performed by: NURSE ANESTHETIST, CERTIFIED REGISTERED

## 2022-11-08 PROCEDURE — 76210000016 HC OR PH I REC 1 TO 1.5 HR: Performed by: ORTHOPAEDIC SURGERY

## 2022-11-08 PROCEDURE — 77030013079 HC BLNKT BAIR HGGR 3M -A: Performed by: NURSE ANESTHETIST, CERTIFIED REGISTERED

## 2022-11-08 PROCEDURE — 77030040361 HC SLV COMPR DVT MDII -B: Performed by: ORTHOPAEDIC SURGERY

## 2022-11-08 PROCEDURE — 74011250636 HC RX REV CODE- 250/636: Performed by: ORTHOPAEDIC SURGERY

## 2022-11-08 PROCEDURE — 77030040356 HC CORD BPLR FRCP COVD -A: Performed by: ORTHOPAEDIC SURGERY

## 2022-11-08 PROCEDURE — 26011 DRAINAGE OF FINGER ABSCESS: CPT | Performed by: ORTHOPAEDIC SURGERY

## 2022-11-08 PROCEDURE — 74011250637 HC RX REV CODE- 250/637: Performed by: NURSE ANESTHETIST, CERTIFIED REGISTERED

## 2022-11-08 PROCEDURE — 00400 ANES INTEGUMENTARY SYS NOS: CPT | Performed by: ANESTHESIOLOGY

## 2022-11-08 PROCEDURE — 77030010509 HC AIRWY LMA MSK TELE -A: Performed by: NURSE ANESTHETIST, CERTIFIED REGISTERED

## 2022-11-08 PROCEDURE — 87070 CULTURE OTHR SPECIMN AEROBIC: CPT

## 2022-11-08 PROCEDURE — 88304 TISSUE EXAM BY PATHOLOGIST: CPT

## 2022-11-08 PROCEDURE — 88311 DECALCIFY TISSUE: CPT

## 2022-11-08 PROCEDURE — 74011000250 HC RX REV CODE- 250: Performed by: ORTHOPAEDIC SURGERY

## 2022-11-08 PROCEDURE — 74011250636 HC RX REV CODE- 250/636: Performed by: NURSE ANESTHETIST, CERTIFIED REGISTERED

## 2022-11-08 PROCEDURE — 11730 AVULSION NAIL PLATE SIMPLE 1: CPT | Performed by: ORTHOPAEDIC SURGERY

## 2022-11-08 PROCEDURE — 87102 FUNGUS ISOLATION CULTURE: CPT

## 2022-11-08 PROCEDURE — 77030006689 HC BLD OPHTH BVR BD -A: Performed by: ORTHOPAEDIC SURGERY

## 2022-11-08 PROCEDURE — 76010000153 HC OR TIME 1.5 TO 2 HR: Performed by: ORTHOPAEDIC SURGERY

## 2022-11-08 PROCEDURE — 87205 SMEAR GRAM STAIN: CPT

## 2022-11-08 PROCEDURE — 87077 CULTURE AEROBIC IDENTIFY: CPT

## 2022-11-08 PROCEDURE — 74011000250 HC RX REV CODE- 250: Performed by: NURSE ANESTHETIST, CERTIFIED REGISTERED

## 2022-11-08 PROCEDURE — 88312 SPECIAL STAINS GROUP 1: CPT

## 2022-11-08 PROCEDURE — 87185 SC STD ENZYME DETCJ PER NZM: CPT

## 2022-11-08 PROCEDURE — 87075 CULTR BACTERIA EXCEPT BLOOD: CPT

## 2022-11-08 PROCEDURE — 76060000034 HC ANESTHESIA 1.5 TO 2 HR: Performed by: ORTHOPAEDIC SURGERY

## 2022-11-08 PROCEDURE — 2709999900 HC NON-CHARGEABLE SUPPLY: Performed by: ORTHOPAEDIC SURGERY

## 2022-11-08 RX ORDER — HYDROMORPHONE HYDROCHLORIDE 1 MG/ML
0.5 INJECTION, SOLUTION INTRAMUSCULAR; INTRAVENOUS; SUBCUTANEOUS
Status: DISCONTINUED | OUTPATIENT
Start: 2022-11-08 | End: 2022-11-08 | Stop reason: HOSPADM

## 2022-11-08 RX ORDER — CEPHALEXIN 500 MG/1
500 CAPSULE ORAL 4 TIMES DAILY
Qty: 40 CAPSULE | Refills: 0 | Status: SHIPPED
Start: 2022-11-08 | End: 2022-11-11 | Stop reason: ALTCHOICE

## 2022-11-08 RX ORDER — OXYCODONE AND ACETAMINOPHEN 7.5; 325 MG/1; MG/1
1 TABLET ORAL
Qty: 20 TABLET | Refills: 0 | Status: SHIPPED | OUTPATIENT
Start: 2022-11-08 | End: 2022-11-13

## 2022-11-08 RX ORDER — CEPHALEXIN 500 MG/1
500 CAPSULE ORAL 4 TIMES DAILY
Qty: 20 CAPSULE | Refills: 0 | Status: SHIPPED | OUTPATIENT
Start: 2022-11-08 | End: 2022-11-08 | Stop reason: SDUPTHER

## 2022-11-08 RX ORDER — SODIUM CHLORIDE 0.9 % (FLUSH) 0.9 %
5-40 SYRINGE (ML) INJECTION AS NEEDED
Status: DISCONTINUED | OUTPATIENT
Start: 2022-11-08 | End: 2022-11-08 | Stop reason: HOSPADM

## 2022-11-08 RX ORDER — ONDANSETRON 2 MG/ML
INJECTION INTRAMUSCULAR; INTRAVENOUS AS NEEDED
Status: DISCONTINUED | OUTPATIENT
Start: 2022-11-08 | End: 2022-11-08 | Stop reason: HOSPADM

## 2022-11-08 RX ORDER — LIDOCAINE HYDROCHLORIDE 20 MG/ML
INJECTION, SOLUTION EPIDURAL; INFILTRATION; INTRACAUDAL; PERINEURAL AS NEEDED
Status: DISCONTINUED | OUTPATIENT
Start: 2022-11-08 | End: 2022-11-08 | Stop reason: HOSPADM

## 2022-11-08 RX ORDER — SODIUM CHLORIDE 0.9 % (FLUSH) 0.9 %
5-40 SYRINGE (ML) INJECTION EVERY 8 HOURS
Status: DISCONTINUED | OUTPATIENT
Start: 2022-11-08 | End: 2022-11-08 | Stop reason: HOSPADM

## 2022-11-08 RX ORDER — MIDAZOLAM HYDROCHLORIDE 1 MG/ML
INJECTION, SOLUTION INTRAMUSCULAR; INTRAVENOUS AS NEEDED
Status: DISCONTINUED | OUTPATIENT
Start: 2022-11-08 | End: 2022-11-08 | Stop reason: HOSPADM

## 2022-11-08 RX ORDER — DEXAMETHASONE SODIUM PHOSPHATE 4 MG/ML
INJECTION, SOLUTION INTRA-ARTICULAR; INTRALESIONAL; INTRAMUSCULAR; INTRAVENOUS; SOFT TISSUE AS NEEDED
Status: DISCONTINUED | OUTPATIENT
Start: 2022-11-08 | End: 2022-11-08 | Stop reason: HOSPADM

## 2022-11-08 RX ORDER — FENTANYL CITRATE 50 UG/ML
INJECTION, SOLUTION INTRAMUSCULAR; INTRAVENOUS AS NEEDED
Status: DISCONTINUED | OUTPATIENT
Start: 2022-11-08 | End: 2022-11-08 | Stop reason: HOSPADM

## 2022-11-08 RX ORDER — PROCHLORPERAZINE EDISYLATE 5 MG/ML
5 INJECTION INTRAMUSCULAR; INTRAVENOUS ONCE
Status: DISCONTINUED | OUTPATIENT
Start: 2022-11-08 | End: 2022-11-08 | Stop reason: HOSPADM

## 2022-11-08 RX ORDER — FENTANYL CITRATE 50 UG/ML
25 INJECTION, SOLUTION INTRAMUSCULAR; INTRAVENOUS AS NEEDED
Status: DISCONTINUED | OUTPATIENT
Start: 2022-11-08 | End: 2022-11-08 | Stop reason: HOSPADM

## 2022-11-08 RX ORDER — PROPOFOL 10 MG/ML
INJECTION, EMULSION INTRAVENOUS AS NEEDED
Status: DISCONTINUED | OUTPATIENT
Start: 2022-11-08 | End: 2022-11-08 | Stop reason: HOSPADM

## 2022-11-08 RX ORDER — LIDOCAINE HYDROCHLORIDE 10 MG/ML
0.1 INJECTION, SOLUTION EPIDURAL; INFILTRATION; INTRACAUDAL; PERINEURAL AS NEEDED
Status: DISCONTINUED | OUTPATIENT
Start: 2022-11-08 | End: 2022-11-08 | Stop reason: HOSPADM

## 2022-11-08 RX ORDER — SODIUM CHLORIDE, SODIUM LACTATE, POTASSIUM CHLORIDE, CALCIUM CHLORIDE 600; 310; 30; 20 MG/100ML; MG/100ML; MG/100ML; MG/100ML
25 INJECTION, SOLUTION INTRAVENOUS CONTINUOUS
Status: DISCONTINUED | OUTPATIENT
Start: 2022-11-08 | End: 2022-11-08 | Stop reason: HOSPADM

## 2022-11-08 RX ORDER — FAMOTIDINE 20 MG/1
20 TABLET, FILM COATED ORAL ONCE
Status: COMPLETED | OUTPATIENT
Start: 2022-11-08 | End: 2022-11-08

## 2022-11-08 RX ORDER — SODIUM CHLORIDE, SODIUM LACTATE, POTASSIUM CHLORIDE, CALCIUM CHLORIDE 600; 310; 30; 20 MG/100ML; MG/100ML; MG/100ML; MG/100ML
50 INJECTION, SOLUTION INTRAVENOUS CONTINUOUS
Status: DISCONTINUED | OUTPATIENT
Start: 2022-11-08 | End: 2022-11-08 | Stop reason: HOSPADM

## 2022-11-08 RX ADMIN — CEFAZOLIN SODIUM 2 G: 1 INJECTION, POWDER, FOR SOLUTION INTRAMUSCULAR; INTRAVENOUS at 12:50

## 2022-11-08 RX ADMIN — SODIUM CHLORIDE, POTASSIUM CHLORIDE, SODIUM LACTATE AND CALCIUM CHLORIDE 25 ML/HR: 600; 310; 30; 20 INJECTION, SOLUTION INTRAVENOUS at 12:30

## 2022-11-08 RX ADMIN — MIDAZOLAM HYDROCHLORIDE 2 MG: 2 INJECTION, SOLUTION INTRAMUSCULAR; INTRAVENOUS at 12:41

## 2022-11-08 RX ADMIN — HYDROMORPHONE HYDROCHLORIDE 0.5 MG: 1 INJECTION, SOLUTION INTRAMUSCULAR; INTRAVENOUS; SUBCUTANEOUS at 14:41

## 2022-11-08 RX ADMIN — FAMOTIDINE 20 MG: 20 TABLET ORAL at 12:22

## 2022-11-08 RX ADMIN — DEXAMETHASONE SODIUM PHOSPHATE 4 MG: 4 INJECTION, SOLUTION INTRAMUSCULAR; INTRAVENOUS at 12:50

## 2022-11-08 RX ADMIN — LIDOCAINE HYDROCHLORIDE 40 MG: 20 INJECTION, SOLUTION EPIDURAL; INFILTRATION; INTRACAUDAL; PERINEURAL at 12:45

## 2022-11-08 RX ADMIN — ONDANSETRON 4 MG: 2 INJECTION INTRAMUSCULAR; INTRAVENOUS at 14:02

## 2022-11-08 RX ADMIN — FENTANYL CITRATE 25 MCG: 50 INJECTION, SOLUTION INTRAMUSCULAR; INTRAVENOUS at 12:50

## 2022-11-08 RX ADMIN — FENTANYL CITRATE 25 MCG: 50 INJECTION, SOLUTION INTRAMUSCULAR; INTRAVENOUS at 13:15

## 2022-11-08 RX ADMIN — FENTANYL CITRATE 50 MCG: 50 INJECTION, SOLUTION INTRAMUSCULAR; INTRAVENOUS at 14:14

## 2022-11-08 RX ADMIN — PROPOFOL 130 MG: 10 INJECTION, EMULSION INTRAVENOUS at 12:45

## 2022-11-08 NOTE — H&P
Update History & Physical    The Patient's History and Physical of October 28, 2022 was reviewed with the patient and I examined the patient. There was no change. The surgical site was confirmed by the patient and me. Plan:  The risk, benefits, expected outcome, and alternative to the recommended procedure have been discussed with the patient. Patient understands and wants to proceed with the procedure.     Electronically signed by Mary Ann Owen DO on 11/8/2022 at 12:18 PM

## 2022-11-08 NOTE — ANESTHESIA POSTPROCEDURE EVALUATION
Procedure(s):  RIGHT THUMB, MIDDLE, RING AND LITTLE FINGER DEBRIDEMENT AND MARSUPIALIZATION  *MAC/LOCAL*. general    Anesthesia Post Evaluation      Multimodal analgesia: multimodal analgesia used between 6 hours prior to anesthesia start to PACU discharge  Patient location during evaluation: bedside  Patient participation: complete - patient participated  Level of consciousness: awake  Pain management: adequate  Airway patency: patent  Anesthetic complications: no  Cardiovascular status: stable  Respiratory status: acceptable  Hydration status: acceptable  Post anesthesia nausea and vomiting:  controlled      INITIAL Post-op Vital signs:   Vitals Value Taken Time   /69 11/08/22 1504   Temp 36.3 °C (97.3 °F) 11/08/22 1415   Pulse 72 11/08/22 1514   Resp 16 11/08/22 1514   SpO2 86 % 11/08/22 1514   Vitals shown include unvalidated device data.

## 2022-11-08 NOTE — OP NOTES
Operative Report    Patient: Bouchra Hannah MRN: [id number]  SSN: [id number]    YOB: 1965  Age: 61 y.o. Sex: female       Date of Surgery: 11/8/2022     Preoperative Diagnosis: Chronic paronychia of thumb, right [L03.011]  Chronic paronychia of finger of right hand [L03.011]  Paronychia of right middle finger [L03.011]  Paronychia of right ring finger [L03.011]  Paronychia of right little finger [L03.011]     Postoperative Diagnosis: Chronic paronychia of thumb, right [L03.011]  Chronic paronychia of finger of right hand [L03.011]  Paronychia of right middle finger [L03.011]  Paronychia of right ring finger [L03.011]  Paronychia of right little finger [V61.318]     Surgeon(s) and Role:     Puma Buitrago, DO - Primary    Assistant: James Nicholas    Anesthesia: MAC and local    Procedure: Procedure(s):  RIGHT THUMB NAIL PLATE REMOVAL, DEBRIDEMENT, AND MARSUPIALIZATION  RIGHT MIDDLE FINGER DEBRIDEMENT AND MARSUPIALIZATION  RIGHT RING FINGER DEBRIDEMENT AND MARSUPIALIZATION   RIGHT LITTLE FINGER DEBRIDEMENT AND MARSUPIALIZATION    Findings: Erythematous eponychial folds and deformed right thumbnai. Inflamed deep dermal tissue. Procedure in Detail:     Indications for procedure been outlined in the perioperative documentation, most notably being not amenable to conservative treatment. Informed consent was obtained from the patient. The risks and benefits of the procedure were discussed with the patient. They include but are not limited to neurovascular injury, tendon/ligamentous injury, blood loss, infection, need for further surgery, hematoma, neuroma, seroma, chronic pain, chronic stiffness, complications from anesthesia including death, and the possibility of juan Covid. After informed consent was obtained, the patient was taken back to the operative suite. A tourniquet was applied to the operative extremity and it was prepped and draped in the normal sterile fashion.   The arm was exsanguinated and the tourniquet was elevated to 250 mmHg. Attention was first turned to digital blocks in the 4 operative digits in the form of a tendon sheath block utilizing 1 to 2 cc in each digit. Attention was first turned to the thumb where a small crescent shaped excisional biopsy of dermal tissue of approximately 3 mm in width was removed proximal to the thumb eponychial fold. This was removed down to the level of the germinal matrix without injuring the germinal matrix. Prior to this the nail plate was removed in its entirety. Cultures were taken and the resected tissue was sent as biopsy. Attention was then turned to the middle finger where again a crescent shaped resection of 3 mm with tissue was removed proximal to the eponychial fold. The nail plate was left intact. Tissue was removed down to the level of the germinal matrix. Attention was then turned to the ring finger where again a crescent shaped 3 mm with excision of dermal tissue down to the level of the germinal matrix was removed. Again the nail plate was left intact on the ring finger. Lastly attention was turned to the little finger where a crescent shaped excision of dermal tissue down to the level of the germinal matrix 3 mm in width was removed. The nail plate was left intact again at the little finger. Electrocautery was used for hemostasis as best as possible without injuring the germinal matrix of all excisional biopsy sites. The tourniquet was let down electrocautery was used for any hemostasis needed. The wounds were copiously irrigated with Irrisept. The wounds were dressed with sterile dressings and individual finger signs. Patient was given appropriate wound care instructions cleansing instructions prescription for pain medicine and follow-up with me in the outpatient setting.     Estimated Blood Loss:  25mL    Tourniquet Time:   Total Tourniquet Time Documented:  Upper Arm (Right) - 35 minutes  Total: Upper Arm (Right) - 35 minutes        Implants: * No implants in log *            Specimens:   ID Type Source Tests Collected by Time Destination   1 : Right Thumbnail and nail fold  Preservative   Duglas Bourne, DO 11/8/2022 1317 Pathology   2 : Right Middle Finger Nail Fold Preservative   Katie Second A, DO 11/8/2022 1326 Pathology   3 : Right Ring Finger Nail Fold Preservative   Katie Second A, DO 11/8/2022 1334 Pathology   4 : Right Little Finger Nail Fold Preservative   Katie Second A, DO 11/8/2022 1334 Pathology   1 : RIGHT THUMB NAIL FOLD CULTURE   AEROBIC/ANAEROBIC CULTURE, FUNGUS Rosendo Bile A, DO 11/8/2022 1358 Microbiology   2 : RIGHT MIDDLE FINGER NAIL FOLD CULTURE   AEROBIC/ANAEROBIC CULTURE, FUNGUS CULT,SKIN,HAIR,NAIL Edil, Karl A, DO 11/8/2022 1359 Microbiology   3 : RIGHT RING FINGER NAIL FOLD CULTURE   AEROBIC/ANAEROBIC CULTURE, FUNGUS CULT,SKIN,HAIR,NAIL Edil, Karl A, DO 11/8/2022 1400 Microbiology   4 : RIGHT LITTLE FINGER NAIL FOLD CULTURE   AEROBIC/ANAEROBIC CULTURE, FUNGUS CULT,SKIN,HAIR,NAIL Edil, Karl A, DO 11/8/2022 1400 Microbiology           Drains: None                Complications: None    Counts: Sponge and needle counts were correct times two.     Signed By:  Wendie Cano,      November 8, 2022

## 2022-11-08 NOTE — BRIEF OP NOTE
Brief Postoperative Note    Patient: Yun Zambrano  YOB: 1965  MRN: [id number]    Date of Procedure: 11/8/2022     Pre-Op Diagnosis: Chronic paronychia of thumb, right [G90.337]  Chronic paronychia of finger of right hand [S03.634]  Paronychia of right middle finger [L03.011]  Paronychia of right ring finger [E44.497]  Paronychia of right little finger [S44.750]    Post-Op Diagnosis: Same as preoperative diagnosis. Procedure(s):  RIGHT THUMB, MIDDLE, RING AND LITTLE FINGER DEBRIDEMENT AND MARSUPIALIZATION  as well as thumb nail plate removal.    Surgeon(s):   Liliana Nuñez DO    Surgical Assistant: Surg Asst-1: Elier Paul    Anesthesia: MAC     Estimated Blood Loss (mL): less than 50     Complications: None    Specimens:   ID Type Source Tests Collected by Time Destination   1 : Right Thumbnail and nail fold  Preservative   Liliana Nuñez, DO 11/8/2022 1317 Pathology   2 : Right Middle Finger Nail Fold Preservative   Wally Fragmin A, DO 11/8/2022 1326 Pathology   3 : Right Ring Finger Nail Fold Preservative   Wally Fragmin A, DO 11/8/2022 1334 Pathology   4 : Right Little Finger Nail Fold Preservative   Wally Fragmin A, DO 11/8/2022 1334 Pathology   1 : RIGHT THUMB NAIL FOLD CULTURE   AEROBIC/ANAEROBIC CULTURE, FUNGUS Pau Torre A, DO 11/8/2022 1358 Microbiology   2 : RIGHT MIDDLE FINGER NAIL FOLD CULTURE   AEROBIC/ANAEROBIC CULTURE, FUNGUS CULT,SKIN,HAIR,NAIL Edil, Karl A, DO 11/8/2022 1359 Microbiology   3 : RIGHT RING FINGER NAIL FOLD CULTURE   AEROBIC/ANAEROBIC CULTURE, FUNGUS CULT,SKIN,HAIR,NAIL Edil, Karl A, DO 11/8/2022 1400 Microbiology   4 : RIGHT LITTLE FINGER NAIL FOLD CULTURE   AEROBIC/ANAEROBIC CULTURE, FUNGUS CULT,SKIN,HAIR,NAIL Edil, Karl A, DO 11/8/2022 1400 Microbiology        Implants: * No implants in log *    Drains: * No LDAs found *    Findings: thickened inflamed tissue    Electronically Signed by Fazal Beach DO on 11/8/2022 at 2:12 PM

## 2022-11-08 NOTE — ANESTHESIA PREPROCEDURE EVALUATION
Relevant Problems   No relevant active problems       Anesthetic History   No history of anesthetic complications            Review of Systems / Medical History  Patient summary reviewed and pertinent labs reviewed    Pulmonary  Within defined limits                 Neuro/Psych         Psychiatric history     Cardiovascular    Hypertension              Exercise tolerance: >4 METS     GI/Hepatic/Renal     GERD: well controlled      PUD     Endo/Other        Arthritis     Other Findings              Physical Exam    Airway  Mallampati: II  TM Distance: 4 - 6 cm  Neck ROM: normal range of motion   Mouth opening: Normal     Cardiovascular    Rhythm: regular  Rate: normal         Dental  No notable dental hx       Pulmonary  Breath sounds clear to auscultation               Abdominal  GI exam deferred       Other Findings            Anesthetic Plan    ASA: 2  Anesthesia type: general          Induction: Intravenous  Anesthetic plan and risks discussed with: Patient

## 2022-11-10 LAB
BACTERIA SPEC CULT: ABNORMAL
BACTERIA SPEC CULT: ABNORMAL
BACTERIA SPEC CULT: NORMAL
BACTERIA SPEC CULT: NORMAL
GRAM STN SPEC: NORMAL
GRAM STN SPEC: NORMAL
SERVICE CMNT-IMP: ABNORMAL
SERVICE CMNT-IMP: NORMAL
SERVICE CMNT-IMP: NORMAL

## 2022-11-11 ENCOUNTER — PATIENT MESSAGE (OUTPATIENT)
Dept: ORTHOPEDIC SURGERY | Age: 57
End: 2022-11-11

## 2022-11-11 DIAGNOSIS — L03.011 CHRONIC PARONYCHIA OF FINGER OF RIGHT HAND: Primary | ICD-10-CM

## 2022-11-11 LAB
BACTERIA SPEC CULT: ABNORMAL
GRAM STN SPEC: ABNORMAL
GRAM STN SPEC: ABNORMAL
SERVICE CMNT-IMP: ABNORMAL

## 2022-11-11 RX ORDER — ITRACONAZOLE 100 MG/1
100 CAPSULE ORAL 2 TIMES DAILY
Qty: 28 CAPSULE | Refills: 0 | Status: SHIPPED | OUTPATIENT
Start: 2022-11-11 | End: 2022-11-25

## 2022-11-11 RX ORDER — SULFAMETHOXAZOLE AND TRIMETHOPRIM 800; 160 MG/1; MG/1
1 TABLET ORAL 2 TIMES DAILY
Qty: 28 TABLET | Refills: 0 | Status: SHIPPED | OUTPATIENT
Start: 2022-11-11 | End: 2022-11-25

## 2022-11-14 LAB
BACTERIA SPEC CULT: ABNORMAL
SERVICE CMNT-IMP: ABNORMAL
SERVICE CMNT-IMP: ABNORMAL

## 2022-11-15 LAB
BACTERIA SPEC CULT: ABNORMAL
BACTERIA SPEC CULT: ABNORMAL
SERVICE CMNT-IMP: ABNORMAL

## 2022-11-21 ENCOUNTER — OFFICE VISIT (OUTPATIENT)
Dept: ORTHOPEDIC SURGERY | Age: 57
End: 2022-11-21
Payer: MEDICARE

## 2022-11-21 VITALS — HEIGHT: 63 IN | WEIGHT: 105 LBS | TEMPERATURE: 97.3 F | BODY MASS INDEX: 18.61 KG/M2

## 2022-11-21 DIAGNOSIS — L03.011 CHRONIC PARONYCHIA OF FINGER OF RIGHT HAND: Primary | ICD-10-CM

## 2022-11-21 PROCEDURE — 99024 POSTOP FOLLOW-UP VISIT: CPT | Performed by: ORTHOPAEDIC SURGERY

## 2022-11-21 NOTE — LETTER
11/21/2022    Patient: Paula Schilder   YOB: 1965   Date of Visit: 11/21/2022     Gaby Slater MD  [address]  Via Fax: [phone]    Dear Gaby Slater MD,      Thank you for referring Ms. Man Mojica to [address] [place] [address] for evaluation. My notes for this consultation are attached. If you have questions, please do not hesitate to call me. I look forward to following your patient along with you.       Sincerely,    Otilia Emerson, DO

## 2022-11-21 NOTE — PROGRESS NOTES
My Murray is a 61 y.o. female right handed unspecified employment. Worker's Compensation and legal considerations: none filed. Vitals:    11/21/22 1001   Temp: 97.3 °F (36.3 °C)   TempSrc: Temporal   Weight: 105 lb (47.6 kg)   Height: 5' 3\" (1.6 m)   PainSc:   4   PainLoc: Hand           Chief Complaint   Patient presents with    Hand Pain     Right hand pain       HPI: Patient presents today for follow-up of right thumb middle ring and little finger debridement and marsupialization of chronic paronychia. She reports overall the pain to be much improved. She is taking antibiotics and antifungals currently. Preop HPI: Patient presents today for preoperative visit she is scheduled for right thumb and middle ring and little finger debridement and marsupialization. She reports no changes since her last visit. 9/9/2022 HPI: Patient presents today due to persistence and recurrence of chronic right paronychial infections. She has tried Betadine and bleach soaks. She has also tried antibiotics. 4/7/2022 HPI: Patient presents today with concerns of deformity and redness around the area of skin proximal to her nail plate. She says it has been going on for many years. 6/4/2021 HPI: Patient presents today with complaints of popping of her left little finger. She notices that it hyperextends and then she is stuck with it bent at the tip. Additionally her left index finger nailbed has become more more deformed. Initial HPI: Patient presents today with complaints of left little finger pain 2 months after spraining it. She also reports chronic symptoms about her eponychial him on her index finger proximal to the nail on the left as well as similar symptoms on the right. She has seen a dermatologist twice for this in the past but did not follow-up. She also has a history of Raynaud's.     Date of onset: December 2020    Injury: Yes: Comment: Fall    Prior Treatment:  Yes: Comment: Urgent care for left little fingerAnd antibiotics for left index finger    Numbness/ Tingling: No      ROS: Review of Systems - General ROS: negative  Psychological ROS: negative  ENT ROS: negative  Allergy and Immunology ROS: negative  Hematological and Lymphatic ROS: negative  Respiratory ROS: no cough, shortness of breath, or wheezing  Cardiovascular ROS: no chest pain or dyspnea on exertion  Gastrointestinal ROS: no abdominal pain, change in bowel habits, or black or bloody stools  Musculoskeletal ROS: positive for - pain in finger - bilateral  Neurological ROS: negative  Dermatological ROS: negative    Past Medical History:   Diagnosis Date    ADD (attention deficit disorder)     Adverse effect of anesthesia     mother has problems with nerve blocks not working    Anemia     Chronic pain     Contact lens/glasses fitting     Finger pain, left     Frequent UTI 2006    GERD (gastroesophageal reflux disease)     H/O eating disorder     remote    Hip fracture, left ([address] 2000    History of hormone replacement therapy     Hypertension     Leg length discrepancy     Migraines, neuralgic     Needs pre-anesthesia assessment     Patient reports a history of high tolerance to pain medicine and prior hisoty of drug use    Post herpetic neuralgia     Psychiatric disorder     PUD (peptic ulcer disease)     Raynaud's disease     Rheumatoid arthritis ([address]     Rheumatoid arthritis ([facility] [address]     Shingles 04/2014    Tooth abscess 10/06/2015    Completed Amoxicillin        Past Surgical History:   Procedure Laterality Date    FOOT/TOES SURGERY PROC UNLISTED Left 2020    foot and toe surgery    HX COLONOSCOPY      HX GYN  2012    fibroids    HX HIP REPLACEMENT Left 2000    HX ORTHOPAEDIC Left 2012    partial hip replacement    HX ORTHOPAEDIC  2009    attempt bunionectomy    HX ORTHOPAEDIC  10/2015    Dr. Naty Mendoza: First Metatarsophylangeal Fusion,etc    HX ORTHOPAEDIC  2014    redo left hip replacement  (denies on 11/4/22)    NEUROLOGICAL PROCEDURE UNLISTED Bilateral 2016    Thalamotomy bilateral and medial       Current Outpatient Medications   Medication Sig Dispense Refill    trimethoprim-sulfamethoxazole (BACTRIM DS, SEPTRA DS) 160-800 mg per tablet Take 1 Tablet by mouth two (2) times a day for 14 days. 28 Tablet 0    itraconazole (SPORONAX) 100 mg capsule Take 1 Capsule by mouth two (2) times a day for 14 days. 28 Capsule 0    prochlorperazine (COMPAZINE) 5 mg tablet Take 5 mg by mouth daily. Takes 2 tablets every am      baclofen (LIORESAL) 20 mg tablet Take 20 mg by mouth four (4) times daily. imipramine (TOFRANIL) 50 mg tablet Take 150 mg by mouth nightly. Takes 3, 50 mg tablets      oxyCODONE-acetaminophen (PERCOCET 7.5) 7.5-325 mg per tablet Take 1 Tablet by mouth every eight (8) hours as needed. polyethylene glycol (Miralax) 17 gram packet Take 1 Packet by mouth daily. (Patient taking differently: Take 17 g by mouth as needed.) 10 Packet 1    levETIRAcetam (Keppra) 1,000 mg tablet Take 1,500 mg by mouth daily. Takes 1 and 1/2 tab every day  Indications: Neuralgia      gabapentin (NEURONTIN) 600 mg tablet Take 600 mg by mouth three (3) times daily. methotrexate (RHEUMATREX) 2.5 mg tablet Take 15 mg by mouth every Sunday. folic acid (FOLVITE) 1 mg tablet Take  by mouth daily. hydrOXYchloroQUINE (PLAQUENIL) 200 mg tablet Take 200 mg by mouth two (2) times a day. azaTHIOprine (IMURAN) 50 mg tablet Take 50 mg by mouth three (3) times daily. Indications: rheumatoid arthritis      dextroamphetamine-amphetamine (ADDERALL) 10 mg tablet Take 10 mg by mouth daily. medroxyPROGESTERone (PROVERA) 5 mg tablet Take 5 mg by mouth daily. CALCIUM CARBONATE/VITAMIN D3 (CALCIUM+D PO) Take  by mouth.      multivitamin (ONE A DAY) tablet Take 1 Tab by mouth daily. estradiol (ESTRACE) 0.5 mg tablet Take  by mouth daily. dronabinoL (MARINOL) 5 mg capsule Take 10 mg by mouth two (2) times daily as needed.  Indications: HEADACHE      carvediloL (COREG) 6.25 mg tablet Take 6.25 mg by mouth two (2) times a day. alendronate (FOSAMAX) 70 mg tablet Take  by mouth every Sunday. venlafaxine-SR (EFFEXOR-XR) 150 mg capsule Take  by mouth daily. pantoprazole (PROTONIX) 40 mg tablet Take 40 mg by mouth daily. Twice a day         Allergies   Allergen Reactions    Aspirin Unknown (comments)     Severe stomach pain and bleeding    Lyrica [Pregabalin] Other (comments)     Slurred speech    Nsaids (Non-Steroidal Anti-Inflammatory Drug) Other (comments)     Hx  Of bleeding ulcers    Sulfa (Sulfonamide Antibiotics) Rash    Tetracycline Hives           PE:     Physical Exam  Vitals and nursing note reviewed. Constitutional:       General: She is not in acute distress. Appearance: Normal appearance. She is not ill-appearing, toxic-appearing or diaphoretic. Cardiovascular:      Pulses: Normal pulses. Pulmonary:      Effort: Pulmonary effort is normal. No respiratory distress. Abdominal:      General: Abdomen is flat. There is no distension. Musculoskeletal:         General: No swelling, tenderness, deformity or signs of injury. Normal range of motion. Cervical back: Normal range of motion and neck supple. Right lower leg: No edema. Left lower leg: No edema. Skin:     General: Skin is warm and dry. Capillary Refill: Capillary refill takes less than 2 seconds. Findings: Erythema present. No bruising. Neurological:      General: No focal deficit present. Mental Status: She is alert and oriented to person, place, and time. Cranial Nerves: No cranial nerve deficit. Sensory: No sensory deficit. Psychiatric:         Mood and Affect: Mood normal.         Behavior: Behavior normal.      Right hand: The ring and little finger are showing signs of early healing with no continued drainage. There is still some erythema noted at all digits.   The thumb and middle are still showing signs of drainage. Surgical pathology:  A: RIGHT THUMB NAIL AND NAIL FOLD, BIOPSY:        SKIN AND NAIL WITH ABUNDANT FUNGAL YEAST AND HYPHAL FORMS. B: RIGHT MIDDLE FINGER NAIL FOLD, BIOPSY:        SKIN WITH RARE SURFACE FUNGAL YEAST FORMS. C: RIGHT RING FINGER NAIL FOLD, BIOPSY:        SKIN WITH NO PATHOLOGIC CHANGE. NO FUNGAL ORGANISMS IDENTIFIED. D: RIGHT LITTLE FINGER NAIL FOLD, BIOPSY:        SKIN WITH NO PATHOLOGIC CHANGE. NO FUNGAL ORGANISMS IDENTIFIED. Micro:  Right little finger:  MIXED  ANAEROBIC GRAM NEGATIVE RODS BETA LACTAMASE NEGATIVE   SCANT YEAST   LIGHT PROTEUS VULGARIS  LIGHT MORGANELLA MORGANII     Right ring finger:  LIGHT ANAEROBIC GRAM NEGATIVE RODS BETA LACTAMASE NEGATIVE  SCANT YEAST  MODERATE KLEBSIELLA OXYTOCA   MODERATE PROTEUS SPECIES    Right middle finger:  SCANT ANGEL ALBICANS   LIGHT PROTEUS VULGARIS   LIGHT ACINETOBACTER BAUMANNII COMPLEX     Right thumb: Negative    Imagin/1/2021 films of bilateral hands does not show any fracture or dislocation. There is an old healed fracture of the fifth metacarpal on the left side. Additionally there is noted soft tissue edema about the PIP joint of the left small finger. ICD-10-CM ICD-9-CM    1. Chronic paronychia of finger of right hand  L03.011 681.02             Plan:     Continue soaks of the area that is still draining. Follow-up and Dispositions    Return in about 6 weeks (around 2023) for Reevaluation.           Plan was reviewed with patient, who verbalized agreement and understanding of the plan

## 2023-01-13 ENCOUNTER — OFFICE VISIT (OUTPATIENT)
Dept: ORTHOPEDIC SURGERY | Age: 58
End: 2023-01-13
Payer: MEDICARE

## 2023-01-13 VITALS
SYSTOLIC BLOOD PRESSURE: 117 MMHG | WEIGHT: 106 LBS | HEIGHT: 63 IN | HEART RATE: 83 BPM | BODY MASS INDEX: 18.78 KG/M2 | TEMPERATURE: 96.9 F | DIASTOLIC BLOOD PRESSURE: 73 MMHG

## 2023-01-13 DIAGNOSIS — L03.011 CHRONIC PARONYCHIA OF FINGER OF RIGHT HAND: Primary | ICD-10-CM

## 2023-01-13 PROCEDURE — 99024 POSTOP FOLLOW-UP VISIT: CPT | Performed by: ORTHOPAEDIC SURGERY

## 2023-01-13 NOTE — PROGRESS NOTES
Jero [place] is a 61 y.o. female right handed unspecified employment. Worker's Compensation and legal considerations: none filed. Vitals:    01/13/23 0940   BP: 117/73   Pulse: 83   Temp: 96.9 °F (36.1 °C)   TempSrc: Temporal   Weight: 106 lb (48.1 kg)   Height: 5' 3\" (1.6 m)   PainSc:   3   PainLoc: Hand           Chief Complaint   Patient presents with    Hand Pain     Right hand pain       HPI: Patient presents today approximately 2 months status post right thumb, middle, ring, and little finger debridement and marsupialization due to chronic paronychia. She reports no pain just some dryness in her skin. 11/21/2022 HPI: Patient presents today for follow-up of right thumb middle ring and little finger debridement and marsupialization of chronic paronychia. She reports overall the pain to be much improved. She is taking antibiotics and antifungals currently. 9/9/2022 HPI: Patient presents today due to persistence and recurrence of chronic right paronychial infections. She has tried Betadine and bleach soaks. She has also tried antibiotics. 4/7/2022 HPI: Patient presents today with concerns of deformity and redness around the area of skin proximal to her nail plate. She says it has been going on for many years. 6/4/2021 HPI: Patient presents today with complaints of popping of her left little finger. She notices that it hyperextends and then she is stuck with it bent at the tip. Additionally her left index finger nailbed has become more more deformed. Initial HPI: Patient presents today with complaints of left little finger pain 2 months after spraining it. She also reports chronic symptoms about her eponychial him on her index finger proximal to the nail on the left as well as similar symptoms on the right. She has seen a dermatologist twice for this in the past but did not follow-up. She also has a history of Raynaud's.     Date of onset: December 2020    Injury: Yes: Comment: Fall    Prior Treatment:  Yes: Comment: Urgent care for left little fingerAnd antibiotics for left index finger    Numbness/ Tingling: No      ROS: Review of Systems - General ROS: negative  Psychological ROS: negative  ENT ROS: negative  Allergy and Immunology ROS: negative  Hematological and Lymphatic ROS: negative  Respiratory ROS: no cough, shortness of breath, or wheezing  Cardiovascular ROS: no chest pain or dyspnea on exertion  Gastrointestinal ROS: no abdominal pain, change in bowel habits, or black or bloody stools  Musculoskeletal ROS: positive for - pain in finger - bilateral  Neurological ROS: negative  Dermatological ROS: negative    Past Medical History:   Diagnosis Date    ADD (attention deficit disorder)     Adverse effect of anesthesia     mother has problems with nerve blocks not working    Anemia     Chronic pain     Contact lens/glasses fitting     Finger pain, left     Frequent UTI 2006    GERD (gastroesophageal reflux disease)     H/O eating disorder     remote    Hip fracture, left ([address] 2000    History of hormone replacement therapy     Hypertension     Leg length discrepancy     Migraines, neuralgic     Needs pre-anesthesia assessment     Patient reports a history of high tolerance to pain medicine and prior hisoty of drug use    Post herpetic neuralgia     Psychiatric disorder     PUD (peptic ulcer disease)     Raynaud's disease     Rheumatoid arthritis ([facility]     Rheumatoid arthritis ([facility]     Shingles 04/2014    Tooth abscess 10/06/2015    Completed Amoxicillin        Past Surgical History:   Procedure Laterality Date    HX COLONOSCOPY      HX GYN  2012    fibroids    HX HIP REPLACEMENT Left 2000    HX ORTHOPAEDIC Left 2012    partial hip replacement    HX ORTHOPAEDIC  2009    attempt bunionectomy    HX ORTHOPAEDIC  10/2015    Dr. Marly Gant: [facility] Met[facility]    HX ORTHOPAEDIC  2014    redo left hip replacement  (denies on 11/4/22)    SD UNLISTED NEUROLOGICAL/NEUROMUSCULAR DX PX Bilateral 2016    Thalamotomy bilateral and medial    MA UNLISTED PROCEDURE FOOT/TOES Left 2020    foot and toe surgery       Current Outpatient Medications   Medication Sig Dispense Refill    prochlorperazine (COMPAZINE) 5 mg tablet Take 5 mg by mouth daily. Takes 2 tablets every am      baclofen (LIORESAL) 20 mg tablet Take 20 mg by mouth four (4) times daily. imipramine (TOFRANIL) 50 mg tablet Take 150 mg by mouth nightly. Takes 3, 50 mg tablets      oxyCODONE-acetaminophen (PERCOCET 7.5) 7.5-325 mg per tablet Take 1 Tablet by mouth every eight (8) hours as needed. polyethylene glycol (Miralax) 17 gram packet Take 1 Packet by mouth daily. (Patient taking differently: Take 17 g by mouth as needed.) 10 Packet 1    levETIRAcetam (Keppra) 1,000 mg tablet Take 1,500 mg by mouth daily. Takes 1 and 1/2 tab every day  Indications: Neuralgia      gabapentin (NEURONTIN) 600 mg tablet Take 600 mg by mouth three (3) times daily. methotrexate (RHEUMATREX) 2.5 mg tablet Take 15 mg by mouth every Sunday. folic acid (FOLVITE) 1 mg tablet Take  by mouth daily. hydrOXYchloroQUINE (PLAQUENIL) 200 mg tablet Take 200 mg by mouth two (2) times a day. azaTHIOprine (IMURAN) 50 mg tablet Take 50 mg by mouth three (3) times daily. Indications: rheumatoid arthritis      dextroamphetamine-amphetamine (ADDERALL) 10 mg tablet Take 10 mg by mouth daily. medroxyPROGESTERone (PROVERA) 5 mg tablet Take 5 mg by mouth daily. CALCIUM CARBONATE/VITAMIN D3 (CALCIUM+D PO) Take  by mouth.      multivitamin (ONE A DAY) tablet Take 1 Tab by mouth daily. estradiol (ESTRACE) 0.5 mg tablet Take  by mouth daily. dronabinoL (MARINOL) 5 mg capsule Take 10 mg by mouth two (2) times daily as needed. Indications: HEADACHE      carvediloL (COREG) 6.25 mg tablet Take 6.25 mg by mouth two (2) times a day. alendronate (FOSAMAX) 70 mg tablet Take  by mouth every Sunday.       venlafaxine-SR (EFFEXOR-XR) 150 mg capsule Take  by mouth daily. pantoprazole (PROTONIX) 40 mg tablet Take 40 mg by mouth daily. Twice a day         Allergies   Allergen Reactions    Aspirin Unknown (comments)     Severe stomach pain and bleeding    Lyrica [Pregabalin] Other (comments)     Slurred speech    Nsaids (Non-Steroidal Anti-Inflammatory Drug) Other (comments)     Hx  Of bleeding ulcers    Sulfa (Sulfonamide Antibiotics) Rash    Tetracycline Hives           PE:     Physical Exam  Vitals and nursing note reviewed. Constitutional:       General: She is not in acute distress. Appearance: Normal appearance. She is not ill-appearing. Cardiovascular:      Pulses: Normal pulses. Pulmonary:      Effort: Pulmonary effort is normal. No respiratory distress. Musculoskeletal:         General: No swelling, tenderness, deformity or signs of injury. Normal range of motion. Cervical back: Normal range of motion and neck supple. Right lower leg: No edema. Left lower leg: No edema. Skin:     General: Skin is warm and dry. Capillary Refill: Capillary refill takes less than 2 seconds. Findings: Erythema present. No bruising. Neurological:      General: No focal deficit present. Mental Status: She is alert and oriented to person, place, and time. Cranial Nerves: No cranial nerve deficit. Sensory: No sensory deficit. Psychiatric:         Mood and Affect: Mood normal.         Behavior: Behavior normal.      Right hand: The ring and little finger are showing signs of early healing with no continued drainage. There is still some erythema noted at all digits. The thumb and middle are still showing signs of drainage. Surgical pathology:  A: RIGHT THUMB NAIL AND NAIL FOLD, BIOPSY:        SKIN AND NAIL WITH ABUNDANT FUNGAL YEAST AND HYPHAL FORMS. B: RIGHT MIDDLE FINGER NAIL FOLD, BIOPSY:        SKIN WITH RARE SURFACE FUNGAL YEAST FORMS.      C: RIGHT RING FINGER NAIL FOLD, BIOPSY:        SKIN WITH NO PATHOLOGIC CHANGE. NO FUNGAL ORGANISMS IDENTIFIED. D: RIGHT LITTLE FINGER NAIL FOLD, BIOPSY:        SKIN WITH NO PATHOLOGIC CHANGE. NO FUNGAL ORGANISMS IDENTIFIED. Micro:  Right little finger:  MIXED  ANAEROBIC GRAM NEGATIVE RODS BETA LACTAMASE NEGATIVE   SCANT YEAST   LIGHT PROTEUS VULGARIS  LIGHT MORGANELLA MORGANII     Right ring finger:  LIGHT ANAEROBIC GRAM NEGATIVE RODS BETA LACTAMASE NEGATIVE  SCANT YEAST  MODERATE KLEBSIELLA OXYTOCA   MODERATE PROTEUS SPECIES    Right middle finger:  SCANT ANGEL ALBICANS   LIGHT PROTEUS VULGARIS   LIGHT ACINETOBACTER BAUMANNII COMPLEX     Right thumb: Negative    Imagin/1/2021 films of bilateral hands does not show any fracture or dislocation. There is an old healed fracture of the fifth metacarpal on the left side. Additionally there is noted soft tissue edema about the PIP joint of the left small finger. ICD-10-CM ICD-9-CM    1. Chronic paronychia of finger of right hand  L03.011 681.02             Plan:     Recommended unscented moisturizer for her hands. Follow-up and Dispositions    Return if symptoms worsen or fail to improve.           Plan was reviewed with patient, who verbalized agreement and understanding of the plan

## 2024-02-06 ENCOUNTER — OFFICE VISIT (OUTPATIENT)
Age: 59
End: 2024-02-06
Payer: MEDICARE

## 2024-02-06 VITALS — HEIGHT: 63 IN | BODY MASS INDEX: 19.77 KG/M2 | WEIGHT: 111.6 LBS

## 2024-02-06 DIAGNOSIS — S42.202A CLOSED FRACTURE OF PROXIMAL END OF LEFT HUMERUS, UNSPECIFIED FRACTURE MORPHOLOGY, INITIAL ENCOUNTER: Primary | ICD-10-CM

## 2024-02-06 DIAGNOSIS — M89.8X2 PAIN OF LEFT HUMERUS: ICD-10-CM

## 2024-02-06 PROCEDURE — 99203 OFFICE O/P NEW LOW 30 MIN: CPT | Performed by: ORTHOPAEDIC SURGERY

## 2024-02-06 PROCEDURE — 73060 X-RAY EXAM OF HUMERUS: CPT | Performed by: ORTHOPAEDIC SURGERY

## 2024-02-06 RX ORDER — TRAMADOL HYDROCHLORIDE 50 MG/1
50 TABLET ORAL EVERY 6 HOURS PRN
Qty: 28 TABLET | Refills: 0 | Status: SHIPPED | OUTPATIENT
Start: 2024-02-06 | End: 2024-02-13

## 2024-02-06 NOTE — PROGRESS NOTES
Nanda Sanchez  1965   Chief Complaint   Patient presents with    Shoulder Pain     Left         HISTORY OF PRESENT ILLNESS  Nanda Sanchez is a 58 y.o. female who presents today for evaluation of left shoulder pain.  Pain is a 7/10. Pain has been present since 02/03/2024 after a fall. [facility] went [facility] a day after the fall and was told that she has a fracture in her left humerus. She presented to clinic today wearing a sling.  Having significant amount of pain with any attempted movement associated with swelling    Has tried following treatments: Injections:No; Brace:No; Therapy:No; Cane/Crutch:No      Allergies   Allergen Reactions    Aspirin      Other reaction(s): Unknown (comments)  Severe stomach pain and bleeding  Other reaction(s): Diarrhea, gi distress  Severe stomach pain and bleeding  BLEEDING      Nsaids Other (See Comments) and Hives     Hx  Of bleeding ulcers  Other reaction(s): gi distress, Other (See Comments)  Hx gi bleed  GI BLEEDING      Sulfa Antibiotics Rash and Hives    Tetracyclines & Related Hives and Rash    Pregabalin Other (See Comments)     Slurred speech  Other reaction(s): other/intolerance  SLURRED SPEECH    Tetracycline Hives        Past Medical History:   Diagnosis Date    ADD (attention deficit disorder)     Adverse effect of anesthesia     mother has problems with nerve blocks not working    Anemia     Chronic pain     Contact lens/glasses fitting     Finger pain, left     Frequent UTI 2006    GERD (gastroesophageal reflux disease)     H/O eating disorder     remote    Hip fracture, left ([facility]) 2000    History of hormone replacement therapy     Hypertension     Leg length discrepancy     Migraines, neuralgic     Needs pre-anesthesia assessment     Patient reports a history of high tolerance to pain medicine and prior hisoty of drug use    Post herpetic neuralgia     Psychiatric disorder     PUD (peptic ulcer disease)     Raynaud's disease     Rheumatoid

## 2024-02-12 ENCOUNTER — OFFICE VISIT (OUTPATIENT)
Age: 59
End: 2024-02-12
Payer: MEDICARE

## 2024-02-12 DIAGNOSIS — S42.202D CLOSED FRACTURE OF PROXIMAL END OF LEFT HUMERUS WITH ROUTINE HEALING, UNSPECIFIED FRACTURE MORPHOLOGY, SUBSEQUENT ENCOUNTER: Primary | ICD-10-CM

## 2024-02-12 DIAGNOSIS — M89.8X2 PAIN OF LEFT HUMERUS: ICD-10-CM

## 2024-02-12 PROCEDURE — 73060 X-RAY EXAM OF HUMERUS: CPT | Performed by: ORTHOPAEDIC SURGERY

## 2024-02-12 PROCEDURE — 99213 OFFICE O/P EST LOW 20 MIN: CPT | Performed by: ORTHOPAEDIC SURGERY

## 2024-02-29 ENCOUNTER — OFFICE VISIT (OUTPATIENT)
Age: 59
End: 2024-02-29
Payer: MEDICARE

## 2024-02-29 VITALS — HEIGHT: 63 IN | BODY MASS INDEX: 19.77 KG/M2

## 2024-02-29 DIAGNOSIS — S42.202D CLOSED FRACTURE OF PROXIMAL END OF LEFT HUMERUS WITH ROUTINE HEALING, UNSPECIFIED FRACTURE MORPHOLOGY, SUBSEQUENT ENCOUNTER: Primary | ICD-10-CM

## 2024-02-29 PROCEDURE — 99213 OFFICE O/P EST LOW 20 MIN: CPT | Performed by: ORTHOPAEDIC SURGERY

## 2024-02-29 PROCEDURE — 73060 X-RAY EXAM OF HUMERUS: CPT | Performed by: ORTHOPAEDIC SURGERY

## 2024-02-29 NOTE — PROGRESS NOTES
Nanda Sanchez  1965   Chief Complaint   Patient presents with    Follow-up     Left shoulder        HISTORY OF PRESENT ILLNESS  Nanda Sanchez is a 58 y.o. female who presents today for reevaluation of left shoulder. Pain is a 4/10. Pain has been present since 02/03/2024 after a fall. The patient has not been to PT yet. She notes that her symptoms have continued to improve.     Patient denies any fever, chills, chest pain, shortness of breath or calf pain. The remainder of the review of systems is negative. There are no new illness or injuries to report since last seen in the office. No changes in medications, allergies, social or family history.      PHYSICAL EXAM:   Ht 1.6 m (5' 3\")   BMI 19.77 kg/m²   The patient is a well-developed, well-nourished female   in no acute distress.  The patient is alert and oriented times three.  The patient is alert and oriented times three. Mood and affect are normal.  LYMPHATIC: lymph nodes are not enlarged and are within normal limits  SKIN: normal in color and non tender to palpation. There are no bruises or abrasions noted.   NEUROLOGICAL: Motor sensory exam is within normal limits. Reflexes are equal bilaterally. There is normal sensation to pinprick and light touch  MUSCULOSKELETAL:  Examination Left shoulder   Skin Intact   AC joint tenderness -   Biceps tenderness -   Forward flexion/Elevation ROM 30   Active abduction ROM 30   Glenohumeral abduction 30   External rotation ROM 30   Internal rotation ROM 30   Apprehension -   Fredy’s Relocation -   Jerk -   Load and Shift -   O’briens -   Speeds -   Impingement sign -   Supraspinatus/Empty Can -, 5/5   External Rotation Strength -, 5/5   Lift Off/Belly Press -, 5/5   Neurovascular Intact   Exquisite tenderness over the proximal humerus and the left with bruising        IMAGING: XR of the left humerus with 2 views obtained in office dated 02/29/2024 reviewed and read by Dr. Hastings: Proximal humerus fracture with a

## 2024-03-14 ENCOUNTER — HOSPITAL ENCOUNTER (OUTPATIENT)
Facility: HOSPITAL | Age: 59
Setting detail: RECURRING SERIES
Discharge: HOME | End: 2024-03-17
Payer: MEDICARE

## 2024-03-14 PROCEDURE — 97162 PT EVAL MOD COMPLEX 30 MIN: CPT | Performed by: PHYSICAL THERAPIST

## 2024-03-14 NOTE — THERAPY EVALUATION
PT DAILY TREATMENT NOTE/SHOULDER EVAL     Patient Name: Nanda Sanchez    Date: 3/14/2024    : 1965  Insurance: Payor: [employer] / Plan: SONI [facility] HMO / Product Type: *No Product type* /      Patient  verified yes     Visit #   Current / Total 1 12   Time   In / Out 11:45 12:33   Pain   In / Out 4/10 2   Subjective Functional Status/Changes: Patient fell on 2/3/2024 and sustained a proximal humerus fracture.  Notes overall improvement and states she does use it a little but was told to let pain be her guide.       Treatment Area: Left shoulder pain [M25.512]    SUBJECTIVE  Pain Level (0-10 scale): 4/10 now; 2/10 at best; 7/10 at worst.  [x]constant []intermittent [x]improving []worsening []no change since onset    Subjective functional status/changes:     PLOF: Running, walking daily 6-7 miles, Pilates. Household chores  Limitations to PLOF: Not running or doing Pilates.  Mechanism of Injury: Patient fell on 2/3/2024.  Current symptoms/Complaints: Patient with constant pain and feeling of weakness in her left arm.  Fear of refracture if she does something wrong.  Unable to sleep on her left side.    Previous Treatment/Compliance: Sling and rest  PMHx/Surgical Hx: ADD, Anemia, Chronic Pain, GERD, HTN, left hip fx, RA, Migraines  Work Hx: MSW, therapist,   Pt Goals: \"For it to be stronger, to have strength, less pain\"  Barriers: [x]pain []financial []time []transportation []other  Cognition: A & O x 3    Other:    OBJECTIVE/EXAMINATION  Domestic Life: Lives with her  and dog.  Activity/Recreational Limitations: limited use of her left arm  Mobility: ambulates without deviation.  Self Care: Independent now.    38 min []Eval - untimed                      Therapeutic Procedures:  Tx Min Billable or 1:1 Min (if diff from Tx Min) Procedure, Rationale, Specifics   10  62450 Manual Therapy (timed):  increase ROM and increase tissue extensibility to improve patient's ability 
order to perform normal ADLs.  Evaluation:  Notes much difficulty.    Frequency / Duration: Patient to be seen 2-3 times per week for 8 WEEKS    Patient/ Caregiver education and instruction: Diagnosis, prognosis, self care, activity modification, and exercises [x]  Plan of care has been reviewed with PTA    Certification Period: 3/14/2024-5/11/2024    Ilia Chicas, PT       3/14/2024       8:21 AM    Payor: [employer] MEDICARE / [facility] / Product Type: *No Product type* /     Physician signature required for Medicare, Medicaid, Worker's Comp, Direct Access   ===================================================================  I certify that the above Therapy Services are being furnished while the patient is under my care. I agree with the treatment plan and certify that this therapy is necessary.    Physician's Signature:_________________________   DATE:_________   TIME:________                           Dereck Weiss,*    ** Signature, Date and Time must be completed for valid certification **  [facility]. Thank you

## 2024-03-19 ENCOUNTER — TELEPHONE (OUTPATIENT)
Facility: HOSPITAL | Age: 59
End: 2024-03-19

## 2024-03-19 NOTE — TELEPHONE ENCOUNTER
Called patient to schedule follow up visits. Patient's insurance approved 6 vistis. No answer, LVM.

## 2024-03-28 ENCOUNTER — OFFICE VISIT (OUTPATIENT)
Age: 59
End: 2024-03-28
Payer: MEDICARE

## 2024-03-28 VITALS — HEIGHT: 63 IN | BODY MASS INDEX: 19.77 KG/M2

## 2024-03-28 DIAGNOSIS — S42.202D CLOSED FRACTURE OF PROXIMAL END OF LEFT HUMERUS WITH ROUTINE HEALING, UNSPECIFIED FRACTURE MORPHOLOGY, SUBSEQUENT ENCOUNTER: Primary | ICD-10-CM

## 2024-03-28 PROCEDURE — 73060 X-RAY EXAM OF HUMERUS: CPT | Performed by: ORTHOPAEDIC SURGERY

## 2024-03-28 PROCEDURE — 99213 OFFICE O/P EST LOW 20 MIN: CPT | Performed by: ORTHOPAEDIC SURGERY

## 2024-03-29 ENCOUNTER — HOSPITAL ENCOUNTER (OUTPATIENT)
Facility: HOSPITAL | Age: 59
Setting detail: RECURRING SERIES
Discharge: HOME | End: 2024-04-01
Payer: MEDICARE

## 2024-03-29 PROCEDURE — 97110 THERAPEUTIC EXERCISES: CPT

## 2024-03-29 PROCEDURE — 97112 NEUROMUSCULAR REEDUCATION: CPT

## 2024-03-29 NOTE — PROGRESS NOTES
PHYSICAL / OCCUPATIONAL THERAPY - DAILY TREATMENT NOTE    Patient Name: Nanda Sanchez    Date: 3/29/2024    : 1965  Insurance: Payor: [employer]ICARE / Plan: SONI [facility] HMO / Product Type: *No Product type* /      Patient  verified Yes     Visit #   Current / Total 2 12   Time   In / Out 305 350   Pain   In / Out 6 with meds  0   Subjective Functional Status/Changes: \"I've been working it a lot today, pulling doors, etc, and my pain is greater because of this.\" Patient reports consistent compliance with HEP \"and more\". Patient states that she was told by MD at yesterday's visit, \"My mobility is great, I'm just weak\". Patient states that her high copay limits her attendance to every other week.      TREATMENT AREA =  Left shoulder pain [M25.512]    OBJECTIVE    Modalities Rationale:     decrease inflammation and decrease pain to improve patient's ability to progress to PLOF and address remaining functional goals.     min [] Estim Unattended, type/location:                                      []  w/ice    []  w/heat    min [] Estim Attended, type/location:                                     []  w/US     []  w/ice    []  w/heat    []  TENS insruct      min []  Mechanical Traction: type/lbs                   []  pro   []  sup   []  int   []  cont    []  before manual    []  after manual    min []  Ultrasound, settings/location:     10 min  unbill [x]  Ice     []  Heat    location/position: Left UE/supine    min []  Paraffin,  details:     min []  Vasopneumatic Device, press/temp:     min []  Whirlpool / Fluido:    If using vaso (only need to measure limb vaso being performed on)      pre-treatment girth :       post-treatment girth :       measured at (landmark location) :      min []  Other:    Skin assessment post-treatment:   Intact      Therapeutic Procedures:    Tx Min Billable or 1:1 Min (if diff from Tx Min) Procedure, Rationale, Specifics    13758 Therapeutic Exercise (timed):

## 2024-04-08 ENCOUNTER — TELEPHONE (OUTPATIENT)
Facility: HOSPITAL | Age: 59
End: 2024-04-08

## 2024-04-09 ENCOUNTER — APPOINTMENT (OUTPATIENT)
Facility: HOSPITAL | Age: 59
End: 2024-04-09
Payer: MEDICARE

## 2024-04-10 ENCOUNTER — OFFICE VISIT (OUTPATIENT)
Age: 59
End: 2024-04-10
Payer: MEDICARE

## 2024-04-10 ENCOUNTER — HOSPITAL ENCOUNTER (OUTPATIENT)
Facility: HOSPITAL | Age: 59
Setting detail: RECURRING SERIES
Discharge: HOME | End: 2024-04-13
Payer: MEDICARE

## 2024-04-10 VITALS — WEIGHT: 105 LBS | HEIGHT: 63 IN | BODY MASS INDEX: 18.61 KG/M2

## 2024-04-10 DIAGNOSIS — L03.012 PARONYCHIA OF THUMB, LEFT: ICD-10-CM

## 2024-04-10 DIAGNOSIS — L03.011 PARONYCHIA OF RIGHT MIDDLE FINGER: Primary | ICD-10-CM

## 2024-04-10 PROCEDURE — 97110 THERAPEUTIC EXERCISES: CPT

## 2024-04-10 PROCEDURE — 97112 NEUROMUSCULAR REEDUCATION: CPT

## 2024-04-10 PROCEDURE — 99213 OFFICE O/P EST LOW 20 MIN: CPT | Performed by: ORTHOPAEDIC SURGERY

## 2024-04-10 PROCEDURE — 97530 THERAPEUTIC ACTIVITIES: CPT

## 2024-04-10 NOTE — PROGRESS NOTES
PHYSICAL / OCCUPATIONAL THERAPY - DAILY TREATMENT NOTE    Patient Name: Nanda Sanchez    Date: 4/10/2024    : 1965  Insurance: Payor: [employer] MEDICARE / Plan: SONI [facility] HMO / Product Type: *No Product type* /      Patient  verified Yes     Visit #   Current / Total 3 12   Time   In / Out 100 155   Pain   In / Out 2 0   Subjective Functional Status/Changes: Patient states she is having pain along her acromion process.      TREATMENT AREA =  Left shoulder pain [M25.512]    OBJECTIVE    Heat (UNBILLED):  location/position: supine; left shoulder .    Min Rationale   10 decrease pain to improve patient's ability to progress to PLOF and address remaining functional goals.     Skin assessment post-treatment:   Intact     Therapeutic Procedures:    92522 Therapeutic Exercise (timed):  increase ROM, strength, coordination, balance, and proprioception to improve patient's ability to progress to PLOF and address remaining functional goals.   Tx Min Billable or 1:1 Min   (if diff from Tx Min) Details:   20 15 See flow sheet as applicable     75820 Neuromuscular Re-Education (timed):  improve balance, coordination, kinesthetic sense, posture, core stability and proprioception to improve patient's ability to develop conscious control of individual muscles and awareness of position of extremities in order to progress to PLOF and address remaining functional goals.   Tx Min Billable or 1:1 Min   (if diff from Tx Min) Details:   15 15 See flow sheet as applicable     93373 Therapeutic Activity (timed):  use of dynamic activities replicating functional movements to increase ROM, strength, coordination, balance, and proprioception in order to improve patient's ability to progress to PLOF and address remaining functional goals.   Tx Min Billable or 1:1 Min   (if diff from Tx Min) Details:   10 10 See flow sheet as applicable       45 40 [facility] Totals Reminder: bill using total billable min of TIMED therapeutic 
required   Reporting Period: (date from last Prog Note/Eval to current Prog Note/Recert)  3/14/2024 - 4/10/2024    RECOMMENDATIONS  Continue therapy per initial Plan of Care or most recent Medicare Recert.      If you have any questions/comments please contact us directly.  Thank you for allowing us to assist in the care of your patient.    JUVENAL LEON, PTA       4/10/2024       1:04 PM

## 2024-04-10 NOTE — PROGRESS NOTES
pain medicine and prior hisoty of drug use    Post herpetic neuralgia     Psychiatric disorder     PUD (peptic ulcer disease)     Raynaud's disease     Rheumatoid arthritis (HCC)     Rheumatoid arthritis (HCC)     Shingles 04/2014    Tooth abscess 10/06/2015    Completed Amoxicillin        Past Surgical History:   Procedure Laterality Date    COLONOSCOPY      FOOT/TOES SURGERY PROC UNLISTED Left 2020    foot and toe surgery    GYN  2012    fibroids    NEUROLOGICAL SURGERY Bilateral 2016    Thalamotomy bilateral and medial    ORTHOPEDIC SURGERY  10/2015    Dr. Abreu: [facility] Metatarsophylangeal Fusion,etc    ORTHOPEDIC SURGERY Left 2012    partial hip replacement    ORTHOPEDIC SURGERY  2014    redo left hip replacement  (denies on 11/4/22)    ORTHOPEDIC SURGERY  2009    attempt bunionectomy    TOTAL HIP ARTHROPLASTY Left 2000        Current Outpatient Medications   Medication Sig Dispense Refill    alendronate (FOSAMAX) 70 MG tablet Take by mouth      amphetamine-dextroamphetamine (ADDERALL) 10 MG tablet Take 1 tablet by mouth daily.      azaTHIOprine (IMURAN) 50 MG tablet Take 1 tablet by mouth 3 times daily      baclofen (LIORESAL) 20 MG tablet Take 1 tablet by mouth 4 times daily      carvedilol (COREG) 6.25 MG tablet Take 1 tablet by mouth 2 times daily      dronabinol (MARINOL) 5 MG capsule Take 2 capsules by mouth 2 times daily as needed.      estradiol (ESTRACE) 0.5 MG tablet Take by mouth daily      folic acid (FOLVITE) 1 MG tablet Take by mouth daily      gabapentin (NEURONTIN) 600 MG tablet Take 1 tablet by mouth 3 times daily.      hydroxychloroquine (PLAQUENIL) 200 MG tablet Take 1 tablet by mouth 2 times daily      imipramine (TOFRANIL) 50 MG tablet Take 3 tablets by mouth      levETIRAcetam (KEPPRA) 1000 MG tablet Take 1.5 tablets by mouth daily      medroxyPROGESTERone (PROVERA) 5 MG tablet Take 1 tablet by mouth daily      methotrexate (RHEUMATREX) 2.5 MG chemo tablet Take 6 tablets by mouth

## 2024-04-24 ENCOUNTER — APPOINTMENT (OUTPATIENT)
Facility: HOSPITAL | Age: 59
End: 2024-04-24
Payer: MEDICARE

## 2024-05-01 ENCOUNTER — TELEPHONE (OUTPATIENT)
Facility: HOSPITAL | Age: 59
End: 2024-05-01

## 2024-06-12 ENCOUNTER — OFFICE VISIT (OUTPATIENT)
Age: 59
End: 2024-06-12
Payer: MEDICARE

## 2024-06-12 VITALS — WEIGHT: 104.2 LBS | BODY MASS INDEX: 18.46 KG/M2 | HEIGHT: 63 IN

## 2024-06-12 DIAGNOSIS — L03.011 PARONYCHIA OF RIGHT MIDDLE FINGER: Primary | ICD-10-CM

## 2024-06-12 DIAGNOSIS — L03.011 CHRONIC PARONYCHIA OF FINGER OF RIGHT HAND: ICD-10-CM

## 2024-06-12 PROCEDURE — 99212 OFFICE O/P EST SF 10 MIN: CPT | Performed by: ORTHOPAEDIC SURGERY

## 2024-06-12 NOTE — PROGRESS NOTES
Nanda Sanchez is a 59 y.o. female right handed.  Worker's Compensation and legal considerations: none    Chief Complaint   Patient presents with    Follow-up     Bilateral hand pain     Pain Score:   1    6/12/2024 HPI: Patient returns today for follow-up due to some continued redness and pain of the right middle finger.  At this point she is not interested in surgery for marsupialization like she had in the past.  She would like to observe for now.    4/10/2024 HPI: Patient presents today with recurrence of symptoms in the right middle finger status post marsupialization over a year ago.  She also reports a new onset of similar symptoms in the left thumb.  She has had recent drainage and taken antibiotics for this which has resolved but she is still having some redness and irritation.      2021 initial HPI: Patient presents today with complaints of left little finger pain 2 months after spraining it.  She also reports chronic symptoms about her eponychial him on her index finger proximal to the nail on the left as well as similar symptoms on the right.  She has seen a dermatologist twice for this in the past but did not follow-up.  She also has a history of Raynaud's.    Date of onset: Mid 2023  Injury: No  Prior Treatment:  Yes: Comment: Marsupialization of right thumb middle ring and little fingers.    ROS: Review of Systems - General ROS: negative except HPI    Past Medical History:   Diagnosis Date    ADD (attention deficit disorder)     Adverse effect of anesthesia     mother has problems with nerve blocks not working    Anemia     Chronic pain     Contact lens/glasses fitting     Finger pain, left     Frequent UTI 2006    GERD (gastroesophageal reflux disease)     H/O eating disorder     remote    Hip fracture, left (HCC) 2000    History of hormone replacement therapy     Hypertension     Leg length discrepancy     Migraines, neuralgic     Needs pre-anesthesia assessment     Patient reports a history of high

## 2024-06-14 ENCOUNTER — TELEPHONE (OUTPATIENT)
Age: 59
End: 2024-06-14

## 2024-06-14 DIAGNOSIS — L03.011 PARONYCHIA OF RIGHT MIDDLE FINGER: Primary | ICD-10-CM

## 2024-06-14 RX ORDER — CEPHALEXIN 500 MG/1
500 CAPSULE ORAL 4 TIMES DAILY
Qty: 40 CAPSULE | Refills: 0 | Status: SHIPPED | OUTPATIENT
Start: 2024-06-14 | End: 2024-06-24

## 2024-06-14 NOTE — TELEPHONE ENCOUNTER
6/24/24 Patient was called. She will  her medication at her pharmacy on file and she will soak with the bedadine mixture three times per day. She will contact us if anything changes.

## 2024-06-14 NOTE — TELEPHONE ENCOUNTER
Patient called in stating that she woke up this morning and had pus coming out of her right middle finger. Patient is requesting to have an antibiotic called in to her local pharmacy listed below.          [place]   [facility], in [facility]       Please call and advise patient at  [phone]

## 2024-06-14 NOTE — TELEPHONE ENCOUNTER
Antibiotic prescription sent.  Please instruct patient to do warm betadine soaks 3 times a day.  She has done this in the past and should know what to do.

## 2024-06-17 ENCOUNTER — TELEPHONE (OUTPATIENT)
Age: 59
End: 2024-06-17

## 2024-06-17 DIAGNOSIS — L03.011 PARONYCHIA OF RIGHT MIDDLE FINGER: ICD-10-CM

## 2024-06-17 RX ORDER — CEPHALEXIN 500 MG/1
500 CAPSULE ORAL 4 TIMES DAILY
Qty: 40 CAPSULE | Refills: 0 | Status: SHIPPED | OUTPATIENT
Start: 2024-06-17 | End: 2024-06-27

## 2024-06-17 NOTE — PROGRESS NOTES
humerus fracture   X-rays obtained 2 views of the left humerus shows a proximal humerus fracture with no change in position and callus formation noted    IMPRESSION:      ICD-10-CM    1. Pain of left humerus  M89.8X2       2. Closed fracture of proximal end of left humerus with routine healing, unspecified fracture morphology, subsequent encounter  S42.202D            PLAN:   1. She is still experiencing some pain with internal rotation but overall her pain has improved.  Continue activities as tolerated letting pain be the guide she will follow up PRN.   Risk factors include: Rheumatoid arthritis  2. No cortisone injection indicated today   3. No Physical/Occupational Therapy indicated today  4. No diagnostic test indicated today:   5. No durable medical equipment indicated today  6. No referral indicated today   7. No medications indicated today:   8. No Narcotic indicated today for short term acute pain secondary to closed displaced fracture of the left humerus. Patient given pain medication for short term acute pain relief. Goal is to treat patient according to above plan and to ultimately have patient off all pain medications once appropriate. If chronic pain management is required beyond what is expected for current orthopedic problem, will refer patient to pain management.  was reviewed and will be reviewed with every medication refill request.       RTC PRN         Documentation by [facility], as documented by Dereck Weiss M.D.   [facility]

## 2024-06-17 NOTE — TELEPHONE ENCOUNTER
Patient called and said that the pharmacy states they never received the script for the Keflex 500 mg medication from .    Patient is asking that the medication be re-sent .    [facility] pharmacy on [facility]   Tel. [phone].    Patient tel. [phone].

## 2024-06-19 ENCOUNTER — OFFICE VISIT (OUTPATIENT)
Age: 59
End: 2024-06-19
Payer: MEDICARE

## 2024-06-19 VITALS — WEIGHT: 104 LBS | HEIGHT: 63 IN | BODY MASS INDEX: 18.43 KG/M2

## 2024-06-19 DIAGNOSIS — S42.202D CLOSED FRACTURE OF PROXIMAL END OF LEFT HUMERUS WITH ROUTINE HEALING, UNSPECIFIED FRACTURE MORPHOLOGY, SUBSEQUENT ENCOUNTER: ICD-10-CM

## 2024-06-19 DIAGNOSIS — M89.8X2 PAIN OF LEFT HUMERUS: Primary | ICD-10-CM

## 2024-06-19 PROCEDURE — 99212 OFFICE O/P EST SF 10 MIN: CPT | Performed by: ORTHOPAEDIC SURGERY

## 2024-08-16 ENCOUNTER — TELEPHONE (OUTPATIENT)
Age: 59
End: 2024-08-16

## 2024-08-16 RX ORDER — CEPHALEXIN 500 MG/1
500 CAPSULE ORAL 4 TIMES DAILY
Qty: 40 CAPSULE | Refills: 0 | Status: SHIPPED | OUTPATIENT
Start: 2024-08-16 | End: 2024-08-26

## 2024-08-16 NOTE — TELEPHONE ENCOUNTER
Patient reports having white bubbly puss coming from her left middle finger, and believes her finger is infected. Patient is asking if she can be prescribed an antibiotic. Patient uses the [facility] on [facility], can be reached at [phone].

## 2024-10-09 ENCOUNTER — OFFICE VISIT (OUTPATIENT)
Age: 59
End: 2024-10-09
Payer: MEDICARE

## 2024-10-09 DIAGNOSIS — M19.172 POST-TRAUMATIC OSTEOARTHRITIS OF BOTH FEET: Primary | ICD-10-CM

## 2024-10-09 DIAGNOSIS — M05.79 RHEUMATOID ARTHRITIS INVOLVING MULTIPLE SITES WITH POSITIVE RHEUMATOID FACTOR (HCC): ICD-10-CM

## 2024-10-09 DIAGNOSIS — M19.171 POST-TRAUMATIC OSTEOARTHRITIS OF BOTH FEET: Primary | ICD-10-CM

## 2024-10-09 PROCEDURE — 73630 X-RAY EXAM OF FOOT: CPT | Performed by: ORTHOPAEDIC SURGERY

## 2024-10-09 PROCEDURE — 99214 OFFICE O/P EST MOD 30 MIN: CPT | Performed by: ORTHOPAEDIC SURGERY

## 2024-10-09 NOTE — PROGRESS NOTES
AMBULATORY PROGRESS NOTE      Patient: Nanda Sanchez             MRN: [id number]     SSN: [id number] There is no height or weight on file to calculate BMI.  YOB: 1965     AGE: 59 y.o.       EX: female    PCP: Matt Villatoro MD       IMPRESSION //  DIAGNOSIS AND TREATMENT PLAN      Nanda Sanchez has a diagnosis of:      DIAGNOSES    1. Post-traumatic osteoarthritis of both feet    2. Rheumatoid arthritis involving multiple sites with positive rheumatoid factor (HCC)          PLAN:    1. Ordered CT WO Contrast of the left forefoot at [facility]  2. Discussed possible MIS procedure to correct alignment of left toes       RTO after CT scan    Orders Placed This Encounter    AMB POC XRAY, FOOT; COMPLETE, 3+ VIEW     Order Specific Question:   Are you Pregnant?     Answer:   No    CT FOOT LEFT WO CONTRAST     Standing Status:   Future     Standing Expiration Date:   10/9/2025        There are no Patient Instructions on file for this visit.      Please follow up with your PCP for any health maintenance as recommended.         Nanda Sanchez  expresses understanding of the diagnosis, treatment plan, and all of their proposed questions were answered to their satisfaction. Patient education has been provided re the diagnoses.         HPI //  OBJECTIVE EXAMINATION      Nanda Sanchez IS A 59 y.o. female who is a/an  established patient, presenting to my outpatient office for evaluation of  the following chief complaint(s):     Chief Complaint   Patient presents with    Toe Pain     Left foot       Patient presents with left foot pain.    She complains of the alignment of the #4 and #5 toes that go underneath of her foot while walking causing discomfort. She is developing a corn to the right #2 toe medially. Denies any pain to the #3 toe.     Of note: she currently takes rheumatologic medications methotrexate, folic acid, hydroxychloroquine, and azathioprine. She was diagnosed osteoporosis from

## 2025-07-09 ENCOUNTER — OFFICE VISIT (OUTPATIENT)
Age: 60
End: 2025-07-09

## 2025-07-09 VITALS
BODY MASS INDEX: 18.25 KG/M2 | WEIGHT: 103 LBS | HEIGHT: 63 IN | DIASTOLIC BLOOD PRESSURE: 78 MMHG | SYSTOLIC BLOOD PRESSURE: 118 MMHG

## 2025-07-09 DIAGNOSIS — S63.287S DISLOCATION OF PROXIMAL INTERPHALANGEAL JOINT OF LEFT LITTLE FINGER, SEQUELA: ICD-10-CM

## 2025-07-09 DIAGNOSIS — M79.641 BILATERAL HAND PAIN: ICD-10-CM

## 2025-07-09 DIAGNOSIS — M79.642 BILATERAL HAND PAIN: ICD-10-CM

## 2025-07-09 DIAGNOSIS — Z01.818 PREOP EXAMINATION: Primary | ICD-10-CM

## 2025-07-09 DIAGNOSIS — S63.287S DISLOCATION OF PROXIMAL INTERPHALANGEAL JOINT OF LEFT LITTLE FINGER, SEQUELA: Primary | ICD-10-CM

## 2025-07-09 NOTE — PROGRESS NOTES
procedure/surgery and desires to proceed. All identified risk factors for the above procedure were discussed with the patient. Informed consent to be obtained from the patient and/or legal guardian on the date of surgery.     Return for Postop.     Plan was reviewed with patient, who verbalized agreement and understanding of the plan    Note: This note was completed using voice recognition software.  Any typographical/name errors or mistakes are unintentional.

## 2025-07-31 ENCOUNTER — TELEPHONE (OUTPATIENT)
Age: 60
End: 2025-07-31

## 2025-07-31 NOTE — TELEPHONE ENCOUNTER
Patient is scheduled for Left little finger proximal interphalangeal joint arthrodesis on 8/26/25.  Patient called stating that she is taking TYMLOS, a daily injectable given to her by her rheumatologist.  She is wondering if she will need to hold this prior to her surgery.  Please advise.

## 2025-08-12 PROBLEM — S63.287A DISLOCATION OF PROXIMAL INTERPHALANGEAL JOINT OF LEFT LITTLE FINGER: Status: ACTIVE | Noted: 2025-08-12

## 2025-08-15 LAB
A/G RATIO: 1.7 RATIO (ref 1.1–2.6)
ALBUMIN: 4.2 G/DL (ref 3.5–5)
ALP BLD-CCNC: 107 U/L (ref 40–120)
ALT SERPL-CCNC: 21 U/L (ref 5–40)
ANION GAP SERPL CALCULATED.3IONS-SCNC: 9 MMOL/L (ref 3–15)
AST SERPL-CCNC: 28 U/L (ref 10–37)
BASOPHILS # BLD: 0 % (ref 0–2)
BASOPHILS ABSOLUTE: 0 K/UL (ref 0–0.2)
BILIRUB SERPL-MCNC: 0.2 MG/DL (ref 0.2–1.2)
BUN BLDV-MCNC: 35 MG/DL (ref 6–22)
CALCIUM SERPL-MCNC: 9 MG/DL (ref 8.4–10.5)
CHLORIDE BLD-SCNC: 93 MMOL/L (ref 98–110)
CO2: 29 MMOL/L (ref 20–32)
CREAT SERPL-MCNC: 0.5 MG/DL (ref 0.8–1.4)
EOSINOPHIL # BLD: 1 % (ref 0–6)
EOSINOPHILS ABSOLUTE: 0.1 K/UL (ref 0–0.5)
GFR, ESTIMATED: >90 ML/MIN/1.73 SQ.M.
GLOBULIN: 2.5 G/DL (ref 2–4)
GLUCOSE: 97 MG/DL (ref 70–99)
HCT VFR BLD CALC: 38.9 % (ref 35.1–48)
HEMOGLOBIN: 13.5 G/DL (ref 11.7–16)
LYMPHOCYTES # BLD: 22 % (ref 20–45)
LYMPHOCYTES ABSOLUTE: 1 K/UL (ref 1–4.8)
MCH RBC QN AUTO: 33 PG (ref 26–34)
MCHC RBC AUTO-ENTMCNC: 35 G/DL (ref 31–36)
MCV RBC AUTO: 95 FL (ref 80–99)
MONOCYTES ABSOLUTE: 0.9 K/UL (ref 0.1–1)
MONOCYTES: 19 % (ref 3–12)
NEUTROPHILS ABSOLUTE: 2.6 K/UL (ref 1.8–7.7)
NEUTROPHILS SEGMENTED: 57 % (ref 40–75)
PDW BLD-RTO: 12.8 % (ref 10–15.5)
PLATELET # BLD: 235 K/UL (ref 140–440)
PMV BLD AUTO: 10.6 FL (ref 9–13)
POTASSIUM SERPL-SCNC: 4.3 MMOL/L (ref 3.5–5.5)
RBC # BLD: 4.1 M/UL (ref 3.8–5.2)
SODIUM BLD-SCNC: 131 MMOL/L (ref 133–145)
TOTAL PROTEIN: 6.7 G/DL (ref 6.2–8.1)
WBC # BLD: 4.6 K/UL (ref 4–11)

## 2025-08-21 RX ORDER — SPIRONOLACTONE 100 MG/1
100 TABLET, FILM COATED ORAL DAILY
COMMUNITY

## 2025-08-25 ENCOUNTER — ANESTHESIA EVENT (OUTPATIENT)
Facility: HOSPITAL | Age: 60
End: 2025-08-25
Payer: MEDICARE

## 2025-08-26 ENCOUNTER — HOSPITAL ENCOUNTER (OUTPATIENT)
Facility: HOSPITAL | Age: 60
Setting detail: OUTPATIENT SURGERY
Discharge: HOME | End: 2025-08-26
Attending: ORTHOPAEDIC SURGERY | Admitting: ORTHOPAEDIC SURGERY
Payer: MEDICARE

## 2025-08-26 ENCOUNTER — ANESTHESIA (OUTPATIENT)
Facility: HOSPITAL | Age: 60
End: 2025-08-26
Payer: MEDICARE

## 2025-08-26 VITALS
HEART RATE: 74 BPM | WEIGHT: 103.8 LBS | SYSTOLIC BLOOD PRESSURE: 104 MMHG | DIASTOLIC BLOOD PRESSURE: 67 MMHG | BODY MASS INDEX: 18.39 KG/M2 | RESPIRATION RATE: 17 BRPM | OXYGEN SATURATION: 98 % | HEIGHT: 63 IN | TEMPERATURE: 97.3 F

## 2025-08-26 DIAGNOSIS — S63.287A DISLOCATION OF PROXIMAL INTERPHALANGEAL JOINT OF LEFT LITTLE FINGER, INITIAL ENCOUNTER: Primary | ICD-10-CM

## 2025-08-26 LAB
AMPHET UR QL SCN: POSITIVE
BARBITURATES UR QL SCN: NEGATIVE
BENZODIAZ UR QL: NEGATIVE
CANNABINOIDS UR QL SCN: POSITIVE
COCAINE UR QL SCN: NEGATIVE
FENTANYL: NEGATIVE
Lab: ABNORMAL
METHADONE UR QL: NEGATIVE
OPIATES UR QL: NEGATIVE
OXYCODONE UR QL SCN: POSITIVE

## 2025-08-26 PROCEDURE — 3700000001 HC ADD 15 MINUTES (ANESTHESIA): Performed by: ORTHOPAEDIC SURGERY

## 2025-08-26 PROCEDURE — 6370000000 HC RX 637 (ALT 250 FOR IP): Performed by: NURSE ANESTHETIST, CERTIFIED REGISTERED

## 2025-08-26 PROCEDURE — 6360000002 HC RX W HCPCS

## 2025-08-26 PROCEDURE — 6360000002 HC RX W HCPCS: Performed by: NURSE ANESTHETIST, CERTIFIED REGISTERED

## 2025-08-26 PROCEDURE — 2580000003 HC RX 258: Performed by: ORTHOPAEDIC SURGERY

## 2025-08-26 PROCEDURE — 3600000004 HC SURGERY LEVEL 4 BASE: Performed by: ORTHOPAEDIC SURGERY

## 2025-08-26 PROCEDURE — 3600000014 HC SURGERY LEVEL 4 ADDTL 15MIN: Performed by: ORTHOPAEDIC SURGERY

## 2025-08-26 PROCEDURE — 7100000000 HC PACU RECOVERY - FIRST 15 MIN: Performed by: ORTHOPAEDIC SURGERY

## 2025-08-26 PROCEDURE — 2580000003 HC RX 258: Performed by: NURSE ANESTHETIST, CERTIFIED REGISTERED

## 2025-08-26 PROCEDURE — C1713 ANCHOR/SCREW BN/BN,TIS/BN: HCPCS | Performed by: ORTHOPAEDIC SURGERY

## 2025-08-26 PROCEDURE — 6360000002 HC RX W HCPCS: Performed by: ORTHOPAEDIC SURGERY

## 2025-08-26 PROCEDURE — 7100000011 HC PHASE II RECOVERY - ADDTL 15 MIN: Performed by: ORTHOPAEDIC SURGERY

## 2025-08-26 PROCEDURE — 3700000000 HC ANESTHESIA ATTENDED CARE: Performed by: ORTHOPAEDIC SURGERY

## 2025-08-26 PROCEDURE — 7100000010 HC PHASE II RECOVERY - FIRST 15 MIN: Performed by: ORTHOPAEDIC SURGERY

## 2025-08-26 PROCEDURE — C1769 GUIDE WIRE: HCPCS | Performed by: ORTHOPAEDIC SURGERY

## 2025-08-26 PROCEDURE — 94761 N-INVAS EAR/PLS OXIMETRY MLT: CPT

## 2025-08-26 PROCEDURE — 6370000000 HC RX 637 (ALT 250 FOR IP): Performed by: ORTHOPAEDIC SURGERY

## 2025-08-26 PROCEDURE — 2500000003 HC RX 250 WO HCPCS: Performed by: ORTHOPAEDIC SURGERY

## 2025-08-26 PROCEDURE — 2709999900 HC NON-CHARGEABLE SUPPLY: Performed by: ORTHOPAEDIC SURGERY

## 2025-08-26 PROCEDURE — 80307 DRUG TEST PRSMV CHEM ANLYZR: CPT

## 2025-08-26 PROCEDURE — 7100000001 HC PACU RECOVERY - ADDTL 15 MIN: Performed by: ORTHOPAEDIC SURGERY

## 2025-08-26 PROCEDURE — 2720000010 HC SURG SUPPLY STERILE: Performed by: ORTHOPAEDIC SURGERY

## 2025-08-26 DEVICE — IMPLANTABLE DEVICE: Type: IMPLANTABLE DEVICE | Site: LITTLE FINGER | Status: FUNCTIONAL

## 2025-08-26 RX ORDER — FAMOTIDINE 20 MG/1
20 TABLET, FILM COATED ORAL ONCE
Status: COMPLETED | OUTPATIENT
Start: 2025-08-26 | End: 2025-08-26

## 2025-08-26 RX ORDER — ONDANSETRON 2 MG/ML
INJECTION INTRAMUSCULAR; INTRAVENOUS
Status: DISCONTINUED | OUTPATIENT
Start: 2025-08-26 | End: 2025-08-26 | Stop reason: SDUPTHER

## 2025-08-26 RX ORDER — OXYCODONE HYDROCHLORIDE 5 MG/1
5 TABLET ORAL ONCE
Refills: 0 | Status: COMPLETED | OUTPATIENT
Start: 2025-08-26 | End: 2025-08-26

## 2025-08-26 RX ORDER — DEXTROSE MONOHYDRATE 100 MG/ML
INJECTION, SOLUTION INTRAVENOUS CONTINUOUS PRN
Status: DISCONTINUED | OUTPATIENT
Start: 2025-08-26 | End: 2025-08-26 | Stop reason: HOSPADM

## 2025-08-26 RX ORDER — SODIUM CHLORIDE 0.9 % (FLUSH) 0.9 %
5-40 SYRINGE (ML) INJECTION PRN
Status: DISCONTINUED | OUTPATIENT
Start: 2025-08-26 | End: 2025-08-26 | Stop reason: HOSPADM

## 2025-08-26 RX ORDER — FENTANYL CITRATE 50 UG/ML
INJECTION, SOLUTION INTRAMUSCULAR; INTRAVENOUS
Status: DISCONTINUED | OUTPATIENT
Start: 2025-08-26 | End: 2025-08-26 | Stop reason: SDUPTHER

## 2025-08-26 RX ORDER — PROPOFOL 10 MG/ML
INJECTION, EMULSION INTRAVENOUS
Status: DISCONTINUED | OUTPATIENT
Start: 2025-08-26 | End: 2025-08-26 | Stop reason: SDUPTHER

## 2025-08-26 RX ORDER — SODIUM CHLORIDE 0.9 % (FLUSH) 0.9 %
5-40 SYRINGE (ML) INJECTION EVERY 12 HOURS SCHEDULED
Status: DISCONTINUED | OUTPATIENT
Start: 2025-08-26 | End: 2025-08-26 | Stop reason: HOSPADM

## 2025-08-26 RX ORDER — ONDANSETRON 2 MG/ML
4 INJECTION INTRAMUSCULAR; INTRAVENOUS
Status: DISCONTINUED | OUTPATIENT
Start: 2025-08-26 | End: 2025-08-26 | Stop reason: HOSPADM

## 2025-08-26 RX ORDER — SODIUM CHLORIDE 9 MG/ML
INJECTION, SOLUTION INTRAVENOUS PRN
Status: DISCONTINUED | OUTPATIENT
Start: 2025-08-26 | End: 2025-08-26 | Stop reason: HOSPADM

## 2025-08-26 RX ORDER — MIDAZOLAM HYDROCHLORIDE 1 MG/ML
INJECTION, SOLUTION INTRAMUSCULAR; INTRAVENOUS
Status: DISCONTINUED | OUTPATIENT
Start: 2025-08-26 | End: 2025-08-26 | Stop reason: SDUPTHER

## 2025-08-26 RX ORDER — SODIUM CHLORIDE, SODIUM LACTATE, POTASSIUM CHLORIDE, CALCIUM CHLORIDE 600; 310; 30; 20 MG/100ML; MG/100ML; MG/100ML; MG/100ML
INJECTION, SOLUTION INTRAVENOUS CONTINUOUS
Status: DISCONTINUED | OUTPATIENT
Start: 2025-08-26 | End: 2025-08-26 | Stop reason: HOSPADM

## 2025-08-26 RX ORDER — OXYCODONE AND ACETAMINOPHEN 7.5; 325 MG/1; MG/1
1 TABLET ORAL EVERY 6 HOURS PRN
Qty: 20 TABLET | Refills: 0 | Status: SHIPPED | OUTPATIENT
Start: 2025-08-26 | End: 2025-08-28

## 2025-08-26 RX ORDER — GLUCAGON 1 MG
1 KIT INJECTION PRN
Status: DISCONTINUED | OUTPATIENT
Start: 2025-08-26 | End: 2025-08-26 | Stop reason: HOSPADM

## 2025-08-26 RX ORDER — PHENYLEPHRINE HCL IN 0.9% NACL 1 MG/10 ML
SYRINGE (ML) INTRAVENOUS
Status: DISCONTINUED | OUTPATIENT
Start: 2025-08-26 | End: 2025-08-26 | Stop reason: SDUPTHER

## 2025-08-26 RX ORDER — DEXAMETHASONE SODIUM PHOSPHATE 4 MG/ML
INJECTION, SOLUTION INTRA-ARTICULAR; INTRALESIONAL; INTRAMUSCULAR; INTRAVENOUS; SOFT TISSUE
Status: DISCONTINUED | OUTPATIENT
Start: 2025-08-26 | End: 2025-08-26 | Stop reason: SDUPTHER

## 2025-08-26 RX ORDER — FENTANYL CITRATE 50 UG/ML
25 INJECTION, SOLUTION INTRAMUSCULAR; INTRAVENOUS EVERY 5 MIN PRN
Refills: 0 | Status: DISCONTINUED | OUTPATIENT
Start: 2025-08-26 | End: 2025-08-26 | Stop reason: HOSPADM

## 2025-08-26 RX ORDER — LIDOCAINE HYDROCHLORIDE 10 MG/ML
1 INJECTION, SOLUTION EPIDURAL; INFILTRATION; INTRACAUDAL; PERINEURAL
Status: DISCONTINUED | OUTPATIENT
Start: 2025-08-26 | End: 2025-08-26 | Stop reason: HOSPADM

## 2025-08-26 RX ORDER — LIDOCAINE HYDROCHLORIDE 20 MG/ML
INJECTION, SOLUTION EPIDURAL; INFILTRATION; INTRACAUDAL; PERINEURAL
Status: DISCONTINUED | OUTPATIENT
Start: 2025-08-26 | End: 2025-08-26 | Stop reason: SDUPTHER

## 2025-08-26 RX ADMIN — FENTANYL CITRATE 25 MCG: 50 INJECTION INTRAMUSCULAR; INTRAVENOUS at 10:57

## 2025-08-26 RX ADMIN — SODIUM CHLORIDE, SODIUM LACTATE, POTASSIUM CHLORIDE, AND CALCIUM CHLORIDE: 600; 310; 30; 20 INJECTION, SOLUTION INTRAVENOUS at 10:40

## 2025-08-26 RX ADMIN — LIDOCAINE HYDROCHLORIDE 60 MG: 20 INJECTION, SOLUTION EPIDURAL; INFILTRATION; INTRACAUDAL; PERINEURAL at 08:57

## 2025-08-26 RX ADMIN — WATER 2000 MG: 1 INJECTION INTRAMUSCULAR; INTRAVENOUS; SUBCUTANEOUS at 09:05

## 2025-08-26 RX ADMIN — FENTANYL CITRATE 25 MCG: 50 INJECTION INTRAMUSCULAR; INTRAVENOUS at 08:57

## 2025-08-26 RX ADMIN — Medication 50 MCG: at 09:09

## 2025-08-26 RX ADMIN — Medication 50 MCG: at 10:29

## 2025-08-26 RX ADMIN — HYDROMORPHONE HYDROCHLORIDE 0.5 MG: 1 INJECTION, SOLUTION INTRAMUSCULAR; INTRAVENOUS; SUBCUTANEOUS at 11:59

## 2025-08-26 RX ADMIN — FENTANYL CITRATE 25 MCG: 50 INJECTION INTRAMUSCULAR; INTRAVENOUS at 09:17

## 2025-08-26 RX ADMIN — TRANEXAMIC ACID 1000 MG: 100 INJECTION, SOLUTION INTRAVENOUS at 08:50

## 2025-08-26 RX ADMIN — MIDAZOLAM 2 MG: 1 INJECTION, SOLUTION INTRAMUSCULAR; INTRAVENOUS at 08:50

## 2025-08-26 RX ADMIN — FAMOTIDINE 20 MG: 20 TABLET, FILM COATED ORAL at 08:40

## 2025-08-26 RX ADMIN — OXYCODONE HYDROCHLORIDE 5 MG: 5 TABLET ORAL at 12:12

## 2025-08-26 RX ADMIN — SODIUM CHLORIDE, SODIUM LACTATE, POTASSIUM CHLORIDE, AND CALCIUM CHLORIDE: 600; 310; 30; 20 INJECTION, SOLUTION INTRAVENOUS at 08:40

## 2025-08-26 RX ADMIN — Medication 100 MCG: at 11:23

## 2025-08-26 RX ADMIN — Medication 50 MCG: at 10:50

## 2025-08-26 RX ADMIN — Medication 50 MCG: at 08:59

## 2025-08-26 RX ADMIN — PROPOFOL 150 MG: 10 INJECTION, EMULSION INTRAVENOUS at 08:57

## 2025-08-26 RX ADMIN — ONDANSETRON 4 MG: 2 INJECTION INTRAMUSCULAR; INTRAVENOUS at 09:40

## 2025-08-26 RX ADMIN — DEXAMETHASONE SODIUM PHOSPHATE 4 MG: 4 INJECTION INTRA-ARTICULAR; INTRALESIONAL; INTRAMUSCULAR; INTRAVENOUS; SOFT TISSUE at 09:40

## 2025-08-26 RX ADMIN — Medication 100 MCG: at 10:39

## 2025-08-26 ASSESSMENT — PAIN DESCRIPTION - ORIENTATION
ORIENTATION: LEFT
ORIENTATION: LEFT

## 2025-08-26 ASSESSMENT — PAIN - FUNCTIONAL ASSESSMENT
PAIN_FUNCTIONAL_ASSESSMENT: 0-10

## 2025-08-26 ASSESSMENT — PAIN DESCRIPTION - LOCATION
LOCATION: HAND
LOCATION: FINGER (COMMENT WHICH ONE);OTHER (COMMENT)

## 2025-08-26 ASSESSMENT — PAIN DESCRIPTION - PAIN TYPE: TYPE: SURGICAL PAIN

## 2025-08-26 ASSESSMENT — PAIN DESCRIPTION - DESCRIPTORS
DESCRIPTORS: THROBBING
DESCRIPTORS: THROBBING
DESCRIPTORS: ACHING;DISCOMFORT;THROBBING

## 2025-08-26 ASSESSMENT — PAIN SCALES - GENERAL
PAINLEVEL_OUTOF10: 7
PAINLEVEL_OUTOF10: 0
PAINLEVEL_OUTOF10: 4
PAINLEVEL_OUTOF10: 5
PAINLEVEL_OUTOF10: 6

## 2025-08-27 ENCOUNTER — TELEPHONE (OUTPATIENT)
Age: 60
End: 2025-08-27

## 2025-08-27 DIAGNOSIS — S63.287S DISLOCATION OF PROXIMAL INTERPHALANGEAL JOINT OF LEFT LITTLE FINGER, SEQUELA: Primary | ICD-10-CM

## 2025-08-27 DIAGNOSIS — Z98.890 POST-OPERATIVE STATE: ICD-10-CM

## 2025-08-27 PROBLEM — M15.2 DEGENERATIVE ARTHRITIS OF PROXIMAL INTERPHALANGEAL JOINT OF LITTLE FINGER OF LEFT HAND: Status: ACTIVE | Noted: 2025-08-27

## 2025-08-28 RX ORDER — HYDROCODONE BITARTRATE AND ACETAMINOPHEN 10; 325 MG/1; MG/1
1 TABLET ORAL EVERY 6 HOURS PRN
Qty: 20 TABLET | Refills: 0 | Status: SHIPPED | OUTPATIENT
Start: 2025-08-28 | End: 2025-09-02

## 2025-09-04 ENCOUNTER — OFFICE VISIT (OUTPATIENT)
Age: 60
End: 2025-09-04

## 2025-09-04 VITALS — BODY MASS INDEX: 18.25 KG/M2 | HEIGHT: 63 IN | WEIGHT: 103 LBS

## 2025-09-04 DIAGNOSIS — S63.287S DISLOCATION OF PROXIMAL INTERPHALANGEAL JOINT OF LEFT LITTLE FINGER, SEQUELA: Primary | ICD-10-CM

## 2025-09-04 DIAGNOSIS — Z98.890 POST-OPERATIVE STATE: ICD-10-CM

## 2025-09-04 DIAGNOSIS — Z98.1 ARTHRODESIS STATUS: ICD-10-CM

## (undated) DEVICE — SYR 10ML LUER LOK 1/5ML GRAD --

## (undated) DEVICE — GAUZE,SPONGE,4"X4",16PLY,STRL,LF,10/TRAY: Brand: MEDLINE

## (undated) DEVICE — BLANKET WRM AD W50XL85.8IN PACU FULL BODY FORC AIR

## (undated) DEVICE — SOLUTION SCRB 4OZ 4% CHG CLN BASE FOR PT SKIN ANTISEPSIS

## (undated) DEVICE — KIT CLN UP BON SECOURS MARYV

## (undated) DEVICE — GLOVE SURG SZ 7.5 L11.8IN FNGR THK9.1MIL CUF THK6.7MIL CRM

## (undated) DEVICE — 3 ML SYRINGE WITH HYPODERMIC SAFETY NEEDLE: Brand: MAGELLAN

## (undated) DEVICE — PIN BNE SM NON-THREADED BB-TAK

## (undated) DEVICE — STERILE LATEX POWDER-FREE SURGICAL GLOVESWITH NITRILE COATING: Brand: PROTEXIS

## (undated) DEVICE — BANDAGE,GAUZE,BULKEE II,4.5"X4.1YD,STRL: Brand: MEDLINE

## (undated) DEVICE — PAD,NON-ADHERENT,3X8,STERILE,LF,1/PK: Brand: MEDLINE

## (undated) DEVICE — ZIMMER® STERILE DISPOSABLE TOURNIQUET CUFF WITH PROTECTIVE SLEEVE AND PLC, DUAL PORT, SINGLE BLADDER, 34 IN. (86 CM)

## (undated) DEVICE — SOLUTION IV 1000ML 0.9% SOD CHL

## (undated) DEVICE — GUIDEWIRE ORTH DIA0.034IN W/ DBL TRCR TIP LSR MRK DISP FOR

## (undated) DEVICE — SUTURE VCRL SZ 4-0 L27IN ABSRB UD L26MM SH 1/2 CIR J415H

## (undated) DEVICE — SUT ETHLN 3-0 18IN PS1 BLK --

## (undated) DEVICE — DRESSING,GAUZE,XEROFORM,CURAD,1"X8",ST: Brand: CURAD

## (undated) DEVICE — PAD,ABDOMINAL,8"X10",ST,LF: Brand: MEDLINE

## (undated) DEVICE — Device

## (undated) DEVICE — BANDAGE COBAN 4 IN COMPR W4INXL5YD FOAM COHESIVE QUIK STK SELF ADH SFT

## (undated) DEVICE — THREE-QUARTER SHEET: Brand: CONVERTORS

## (undated) DEVICE — .035" X 5.75" ST GUIDE WIRE: Brand: ACUMED

## (undated) DEVICE — PACK SURG BSHR TOT KNEE LF

## (undated) DEVICE — C-ARMOR C-ARM EQUIPMENT COVERS CLEAR STERILE UNIVERSAL FIT 12 PER CASE: Brand: C-ARMOR

## (undated) DEVICE — BLADE OPHTH GRN ROUNDED TIP 1 SIDE SHRP GRINDLESS MINI-BLDE

## (undated) DEVICE — DISPOSABLE TOURNIQUET CUFF SINGLE BLADDER, DUAL PORT AND QUICK CONNECT CONNECTOR: Brand: COLOR CUFF

## (undated) DEVICE — CORD ES L12FT BPLR FRCP

## (undated) DEVICE — IMPLANTABLE DEVICE
Type: IMPLANTABLE DEVICE | Site: LITTLE FINGER | Status: NON-FUNCTIONAL
Removed: 2025-08-26

## (undated) DEVICE — GOWN,PREVENTION PLUS,2XL,ST,22/CS: Brand: MEDLINE

## (undated) DEVICE — BANDAGE COMPR EXSANGUATION SGL LAYERED NO CLSR 9FT LEN 4IN W

## (undated) DEVICE — 3M™ COBAN™ NL STERILE NON-LATEX SELF-ADHERENT WRAP, 2084S, 4 IN X 5 YD (10 CM X 4,5 M), 18 ROLLS/CASE: Brand: 3M™ COBAN™

## (undated) DEVICE — MICRO-ACUTRAK 2® DRILL, LONG: Brand: ACUMED

## (undated) DEVICE — STOCKINETTE,IMPERVIOUS,12X48,STERILE: Brand: MEDLINE

## (undated) DEVICE — BLADE RMFG OSC COARSE 25X9MM -- LAWSON OEM ITEM 225903

## (undated) DEVICE — WRAP COMPR W4INXL5YD NONSTERILE TAN SELF ADH COBAN

## (undated) DEVICE — GOWN SURG XL L56IN XLN BRTH FLM COMFORTABLE HK LOOP CLSR

## (undated) DEVICE — INTENDED FOR TISSUE SEPARATION, AND OTHER PROCEDURES THAT REQUIRE A SHARP SURGICAL BLADE TO PUNCTURE OR CUT.: Brand: BARD-PARKER SAFETY BLADES SIZE 10, STERILE

## (undated) DEVICE — PADDING CAST W4INXL4YD ST COT COHESIVE HND TEARABLE SPEC

## (undated) DEVICE — DRESSING PETRO W1XL8IN 3% BISMUTH TRIBROMOPHENATE XRFRM

## (undated) DEVICE — SINGLE USE DEVICE INTENDED TO COVER EXPOSED ENDS OF ORTHOPEDIC PIN AND K-WIRES TO HELP PROTECT THE EXPOSED WIRE FROM SNAGGING ON CLOTHING.: Brand: OXBORO™ PIN COVER

## (undated) DEVICE — STERILE POLYISOPRENE POWDER-FREE SURGICAL GLOVES: Brand: PROTEXIS

## (undated) DEVICE — REM POLYHESIVE ADULT PATIENT RETURN ELECTRODE: Brand: VALLEYLAB

## (undated) DEVICE — SOLUTION IRRIG 1000ML H2O STRL BLT

## (undated) DEVICE — BANDAGE,ELASTIC,ESMARK,STERILE,4"X9',LF: Brand: MEDLINE

## (undated) DEVICE — BIPOLAR FORCEPS CORD: Brand: VALLEYLAB

## (undated) DEVICE — DRAPE,HAND,STERILE: Brand: MEDLINE

## (undated) DEVICE — NEEDLE HYPO 25GA L1.5IN BVL ORIENTED ECLIPSE

## (undated) DEVICE — SUTURE MCRYL SZ 3-0 L27IN ABSRB UD L26MM SH 1/2 CIR Y416H

## (undated) DEVICE — DRAPE,EXTREMITY,89X128,STERILE: Brand: MEDLINE

## (undated) DEVICE — SHOE POSTOP M MAN 9-11 UNIV FOAM TRICOT SEMI FLX SKID

## (undated) DEVICE — DRAPE SURG W56XL77IN ABSRB REINF W25XL42IN 3 QTR SHT

## (undated) DEVICE — PAD CARING ABDN COMB STR 8X10 (16/BX 20BX/C

## (undated) DEVICE — Z DISCONTINUED BY MEDLINE USE 2711682 TRAY SKIN PREP DRY W/ PREM GLV

## (undated) DEVICE — INTENDED FOR TISSUE SEPARATION, AND OTHER PROCEDURES THAT REQUIRE A SHARP SURGICAL BLADE TO PUNCTURE OR CUT.: Brand: BARD-PARKER SAFETY BLADES SIZE 15, STERILE

## (undated) DEVICE — BLADE ASSEMB CLP HAIR FINE --

## (undated) DEVICE — SHOE POSTOP M WOMAN 6-8 UNIV FOAM TRICOT SEMI FLX SKID

## (undated) DEVICE — GARMENT,MEDLINE,DVT,SEQUENTIAL,CALF,MD: Brand: MEDLINE

## (undated) DEVICE — PREP SKN CHLRAPRP APL 26ML STR --

## (undated) DEVICE — .045" X 6" ST GUIDE WIRE: Brand: ACUMED

## (undated) DEVICE — DERMACEA GAUZE ROLL: Brand: DERMACEA

## (undated) DEVICE — PACK PROCEDURE SURG MAJ W/ BASIN LF

## (undated) DEVICE — DRAPE XR C ARM 41X74IN LF --

## (undated) DEVICE — GARMENT,MEDLINE,DVT,INT,CALF,MED, GEN2: Brand: MEDLINE

## (undated) DEVICE — SCREW BNE L16MM DIA2.5MM MIC FT ANK TI SELF DRL ST CANN
Type: IMPLANTABLE DEVICE | Site: LITTLE FINGER | Status: NON-FUNCTIONAL
Removed: 2025-08-26

## (undated) DEVICE — BLADE SAW OSC COARSE 25X9MM --

## (undated) DEVICE — INTENDED FOR TISSUE SEPARATION, AND OTHER PROCEDURES THAT REQUIRE A SHARP SURGICAL BLADE TO PUNCTURE OR CUT.: Brand: BARD-PARKER ®  SAFETY SCALPED

## (undated) DEVICE — DRAPE EQUIP CARM MINI STRL

## (undated) DEVICE — SUT ETHLN 3-0 18IN PC5 BLK --

## (undated) DEVICE — SHEET,DRAPE,70X100,STERILE: Brand: MEDLINE

## (undated) DEVICE — APPLICATOR MEDICATED 10.5 CC SOLUTION CLR STRL CHLORAPREP

## (undated) DEVICE — SUTURE VCRL SZ 3-0 L27IN ABSRB UD L26MM SH 1/2 CIR J416H

## (undated) DEVICE — BIT DRL DIA2MM STR CANN FOR 2.5MM MIC COMPR FULL THRD SCR

## (undated) DEVICE — SUTURE VCRL SZ 2-0 L27IN ABSRB UD L26MM SH 1/2 CIR J417H

## (undated) DEVICE — SKIN MARKER,REGULAR TIP WITH RULER AND LABELS: Brand: DEVON

## (undated) DEVICE — APPLIER CLP L9.375IN APER 2.1MM CLS L3.8MM 20 SM TI CLP

## (undated) DEVICE — SPONGE GZ 16 PLY WVN COT 4INX4IN STERIL

## (undated) DEVICE — BANDAGE COMPR W1INXL5YD WHT COT E TAPE RUB BASE ADH CURAD

## (undated) DEVICE — SET,IRRIGATION,CYSTO/TUR,90": Brand: MEDLINE

## (undated) DEVICE — FLEX ADVANTAGE 3000CC: Brand: FLEX ADVANTAGE

## (undated) DEVICE — LIGHT HANDLE: Brand: DEVON

## (undated) DEVICE — BANDAGE,GAUZE,BULKEE II,2.25"X3YD,STRL: Brand: MEDLINE

## (undated) DEVICE — 400ML COMPACT EVACUATOR KIT, 1/8" PVC WITH TROCAR: Brand: HEMOVAC® WOUND DRAINAGE SYSTEM

## (undated) DEVICE — PENROSE TUBING RADIOPAQUE: Brand: ARGYLE

## (undated) DEVICE — PADDING,UNDERCAST,COTTON, 4"X4YD STERILE: Brand: MEDLINE

## (undated) DEVICE — DRAPE,U/SHT,SPLIT,FILM,60X84,STERILE: Brand: MEDLINE